# Patient Record
Sex: MALE | Race: WHITE | Employment: FULL TIME | URBAN - NONMETROPOLITAN AREA
[De-identification: names, ages, dates, MRNs, and addresses within clinical notes are randomized per-mention and may not be internally consistent; named-entity substitution may affect disease eponyms.]

---

## 2017-01-03 DIAGNOSIS — R45.4 DIFFICULTY CONTROLLING ANGER: ICD-10-CM

## 2017-01-04 RX ORDER — FLUOXETINE 10 MG/1
CAPSULE ORAL
Qty: 30 CAPSULE | Refills: 4 | Status: SHIPPED | OUTPATIENT
Start: 2017-01-04 | End: 2018-02-22 | Stop reason: SDUPTHER

## 2017-03-02 ENCOUNTER — OFFICE VISIT (OUTPATIENT)
Dept: FAMILY MEDICINE CLINIC | Age: 42
End: 2017-03-02

## 2017-03-02 VITALS
TEMPERATURE: 97.6 F | HEIGHT: 69 IN | OXYGEN SATURATION: 96 % | SYSTOLIC BLOOD PRESSURE: 116 MMHG | WEIGHT: 199 LBS | DIASTOLIC BLOOD PRESSURE: 80 MMHG | BODY MASS INDEX: 29.47 KG/M2 | HEART RATE: 108 BPM

## 2017-03-02 DIAGNOSIS — Z63.9 RELATIONSHIP PROBLEMS: ICD-10-CM

## 2017-03-02 DIAGNOSIS — T78.40XA ALLERGY, INITIAL ENCOUNTER: ICD-10-CM

## 2017-03-02 DIAGNOSIS — F41.8 SITUATIONAL ANXIETY: ICD-10-CM

## 2017-03-02 DIAGNOSIS — I10 ESSENTIAL HYPERTENSION: ICD-10-CM

## 2017-03-02 DIAGNOSIS — F41.9 ANXIETY: Primary | ICD-10-CM

## 2017-03-02 DIAGNOSIS — Z72.0 TOBACCO USE: ICD-10-CM

## 2017-03-02 PROCEDURE — 99213 OFFICE O/P EST LOW 20 MIN: CPT | Performed by: NURSE PRACTITIONER

## 2017-03-02 RX ORDER — LORATADINE 10 MG/1
10 TABLET ORAL DAILY
Qty: 30 TABLET | Refills: 11 | Status: ON HOLD | OUTPATIENT
Start: 2017-03-02 | End: 2021-06-26

## 2017-03-02 RX ORDER — PAROXETINE HYDROCHLORIDE 20 MG/1
40 TABLET, FILM COATED ORAL DAILY
Qty: 60 TABLET | Refills: 3 | Status: ON HOLD | OUTPATIENT
Start: 2017-03-02 | End: 2021-06-26

## 2017-03-02 SDOH — SOCIAL STABILITY - SOCIAL INSECURITY: PROBLEM RELATED TO PRIMARY SUPPORT GROUP, UNSPECIFIED: Z63.9

## 2017-03-02 ASSESSMENT — ENCOUNTER SYMPTOMS
BACK PAIN: 0
SHORTNESS OF BREATH: 0
SORE THROAT: 0
VOMITING: 0
NAUSEA: 0
HEARTBURN: 0
DIARRHEA: 0
COUGH: 0
CONSTIPATION: 0
ABDOMINAL PAIN: 0

## 2017-03-02 ASSESSMENT — PATIENT HEALTH QUESTIONNAIRE - PHQ9
SUM OF ALL RESPONSES TO PHQ9 QUESTIONS 1 & 2: 0
1. LITTLE INTEREST OR PLEASURE IN DOING THINGS: 0
SUM OF ALL RESPONSES TO PHQ QUESTIONS 1-9: 0
2. FEELING DOWN, DEPRESSED OR HOPELESS: 0

## 2017-10-06 ENCOUNTER — OFFICE VISIT (OUTPATIENT)
Dept: FAMILY MEDICINE CLINIC | Age: 42
End: 2017-10-06

## 2017-10-06 VITALS
OXYGEN SATURATION: 96 % | BODY MASS INDEX: 30.86 KG/M2 | WEIGHT: 203.6 LBS | HEIGHT: 68 IN | HEART RATE: 98 BPM | SYSTOLIC BLOOD PRESSURE: 118 MMHG | DIASTOLIC BLOOD PRESSURE: 78 MMHG

## 2017-10-06 DIAGNOSIS — G89.29 CHRONIC PAIN OF LEFT KNEE: Primary | ICD-10-CM

## 2017-10-06 DIAGNOSIS — Z13.220 SCREENING FOR CHOLESTEROL LEVEL: ICD-10-CM

## 2017-10-06 DIAGNOSIS — M25.562 CHRONIC PAIN OF LEFT KNEE: Primary | ICD-10-CM

## 2017-10-06 DIAGNOSIS — R06.83 SNORING: ICD-10-CM

## 2017-10-06 DIAGNOSIS — R53.82 CHRONIC FATIGUE: ICD-10-CM

## 2017-10-06 DIAGNOSIS — Z13.29 SCREENING FOR THYROID DISORDER: ICD-10-CM

## 2017-10-06 DIAGNOSIS — Z13.0 SCREENING FOR DEFICIENCY ANEMIA: ICD-10-CM

## 2017-10-06 DIAGNOSIS — Z13.21 ENCOUNTER FOR VITAMIN DEFICIENCY SCREENING: ICD-10-CM

## 2017-10-06 DIAGNOSIS — R06.02 SHORTNESS OF BREATH: ICD-10-CM

## 2017-10-06 DIAGNOSIS — Z11.4 ENCOUNTER FOR SCREENING FOR HUMAN IMMUNODEFICIENCY VIRUS (HIV): ICD-10-CM

## 2017-10-06 PROCEDURE — 99214 OFFICE O/P EST MOD 30 MIN: CPT | Performed by: NURSE PRACTITIONER

## 2017-10-06 RX ORDER — PREDNISONE 10 MG/1
TABLET ORAL
Qty: 30 TABLET | Refills: 0 | Status: ON HOLD | OUTPATIENT
Start: 2017-10-06 | End: 2021-06-26

## 2017-10-06 RX ORDER — DICLOFENAC SODIUM 75 MG/1
75 TABLET, DELAYED RELEASE ORAL 2 TIMES DAILY
Qty: 60 TABLET | Refills: 1 | Status: ON HOLD | OUTPATIENT
Start: 2017-10-06 | End: 2021-06-26

## 2017-10-06 ASSESSMENT — ENCOUNTER SYMPTOMS
SHORTNESS OF BREATH: 1
ALLERGIC/IMMUNOLOGIC NEGATIVE: 1
EYES NEGATIVE: 1
WHEEZING: 0
ORTHOPNEA: 0
SWOLLEN GLANDS: 0
HEMOPTYSIS: 0
ABDOMINAL PAIN: 0
BACK PAIN: 0
VOMITING: 0
SPUTUM PRODUCTION: 0
RHINORRHEA: 0
SORE THROAT: 1

## 2017-10-06 NOTE — PROGRESS NOTES
[facility] PHYSICIAN PRACTICES  [place] [address]  Dept: [phone]  Dept Fax: [phone]    Rosi Montanez is a 37 y.o. male who presents today for his medical conditions/complaints as noted below. Rosi Montanez is c/o of Knee Pain (Patient started with left knee pain about two weeks ago, states pain in consent, feels like something is pulling, no injury to knee. rates pain 8 out of 10) and Shortness of Breath (Chronic)        HPI:     Chief Complaint   Patient presents with    Knee Pain     Patient started with left knee pain about two weeks ago, states pain in consent, feels like something is pulling, no injury to knee. rates pain 8 out of 10    Shortness of Breath     Chronic       Knee Pain    Incident onset: About 3 months ago. Incident location: Do injury. There was no injury mechanism. The pain is present in the left knee, left thigh and left hip. The quality of the pain is described as aching (Pulling). The pain is at a severity of 8/10. The pain is severe. The pain has been constant since onset. Associated symptoms include an inability to bear weight and a loss of motion. Pertinent negatives include no loss of sensation, muscle weakness, numbness or tingling. Associated symptoms comments: States he feels like his muscle is pulling in his knee and sometimes pulls up toward his thigh  . He reports no foreign bodies present. The symptoms are aggravated by movement. He has tried nothing (Took his wifes muscle relaxer without relief) for the symptoms. The treatment provided no relief. Shortness of Breath   This is a chronic problem. The current episode started more than 1 year ago. The problem occurs intermittently. The problem has been unchanged. Associated symptoms include a sore throat (When he wakes up from sleeping).  Pertinent negatives include no abdominal pain, chest pain, claudication, coryza, ear pain, fever, headaches, hemoptysis, leg pain, leg reviewed. No pertinent surgical history. Family History   Problem Relation Age of Onset    Diabetes Maternal Grandmother     Heart Disease Maternal Grandmother        Social History   Substance Use Topics    Smoking status: Current Some Day Smoker     Packs/day: 1.00    Smokeless tobacco: Current User     Types: Snuff      Comment: pt is down to 2 cigarettes a day    Alcohol use 25.2 oz/week     42 Cans of beer per week      Comment: 6 beers a day       Current Outpatient Prescriptions   Medication Sig Dispense Refill    predniSONE (DELTASONE) 10 MG tablet Take 40 mg for 3 days then 30 mg for 3 days then 20 mg for 3 days then 10 mg for 3 days 30 tablet 0    diclofenac (VOLTAREN) 75 MG EC tablet Take 1 tablet by mouth 2 times daily 60 tablet 1    loratadine (CLARITIN) 10 MG tablet Take 1 tablet by mouth daily 30 tablet 11    omeprazole (PRILOSEC) 20 MG capsule Take 1 capsule by mouth Daily 30 capsule 3    PARoxetine (PAXIL) 20 MG tablet Take 2 tablets by mouth daily 20 mg qd for 2 weeks, then increase to 40 mg 60 tablet 3    FLUoxetine (PROZAC) 10 MG capsule TAKE ONE CAPSULE BY MOUTH DAILY 30 capsule 4     No current facility-administered medications for this visit. Allergies   Allergen Reactions    Penicillins Other (See Comments)     Reports mother told him he was allergic to PCN. Subjective:      Review of Systems   Constitutional: Positive for fatigue (Chronic). Negative for fever. HENT: Positive for sore throat (When he wakes up from sleeping). Negative for ear pain and rhinorrhea. Eyes: Negative. Respiratory: Positive for shortness of breath. Negative for hemoptysis, sputum production and wheezing. Snoring- suppose to be tested for sleep apnea   Cardiovascular: Negative for chest pain, palpitations, orthopnea, claudication, leg swelling, syncope and PND. Gastrointestinal: Negative for abdominal pain and vomiting. Endocrine: Negative. Genitourinary: Negative. Musculoskeletal: Positive for arthralgias (Left knee) and gait problem (Limps with left knee). Negative for back pain, joint swelling, myalgias, neck pain and neck stiffness. Skin: Negative. Negative for rash. Allergic/Immunologic: Negative. Neurological: Negative for tingling, numbness and headaches. Hematological: Negative. Psychiatric/Behavioral: Negative. Objective:     Vitals:    10/06/17 0935   BP: 118/78   Site: Left Arm   Position: Sitting   Cuff Size: Medium Adult   Pulse: 98   SpO2: 96%   Weight: 203 lb 9.6 oz (92.4 kg)   Height: 5' 8\" (1.727 m)     Physical Exam   Constitutional: He is oriented to person, place, and time. He appears well-developed and well-nourished. No distress. HENT:   Head: Normocephalic and atraumatic. Right Ear: External ear normal.   Left Ear: External ear normal.   Nose: Nose normal.   Mouth/Throat: Oropharynx is clear and moist.   Eyes: Conjunctivae and EOM are normal. Pupils are equal, round, and reactive to light. Right eye exhibits no discharge. Left eye exhibits no discharge. Neck: Normal range of motion. Neck supple. No tracheal deviation present. Cardiovascular: Normal rate, regular rhythm, normal heart sounds and intact distal pulses. Exam reveals no gallop and no friction rub. No murmur heard. Pulmonary/Chest: Effort normal and breath sounds normal. No respiratory distress. He has no wheezes. He has no rales. He exhibits no tenderness. Abdominal: Soft. Bowel sounds are normal. He exhibits no distension and no mass. There is no tenderness. There is no rebound and no guarding. Musculoskeletal: He exhibits no edema, tenderness or deformity. Left knee: He exhibits decreased range of motion. Lymphadenopathy:     He has no cervical adenopathy. Neurological: He is alert and oriented to person, place, and time. Skin: Skin is warm and dry. No rash noted. He is not diaphoretic. Psychiatric: He has a normal mood and affect.  His use the same precautions as listed above. Call if pattern of symptoms change or persists for an extended time. It is very important that he quit smoking. There are various alternatives available to help with this difficult task, but first and foremost, he must make a firm commitment and decision to quit. The nature of nicotine addiction is discussed. The usefulness of behavioral therapy is discussed and suggested. The correct use, cost and side effects of nicotine replacement therapy such as gum or patches is discussed. Bupropion and its cost (sometimes not covered fully by insurance) and side effects are reviewed. The quit rates are discussed. I recommend he not allow potential costs of treatment to deter him from using nicotine replacement therapy or bupropion, as the long term economic and health benefits are obvious. Tobacco abuse: Patient was counseled about stopping tobacco use. Discussed harmful effects of continued tobacco use including COPD, cardiovascular disease, and/or death. Discussed the increased risk of pneumonia as well as the potential for substantial decline in lung function. The following recommendations were given to improve chances of quittin)                   Chances of stopping improve with each attempt     2)                   Encourage all other occupants in the house to quit     3)                   Remove all tobacco products from home, work, and vehicles     4)                   Tobacco cessation aids such as nicotine replacement therapy options    I have recommended that this patient have a flu shot and immunization for pneumonia but he declines at this time. I have discussed the risks and benefits of this procedure with him. The patient verbalizes understanding.

## 2017-10-06 NOTE — MR AVS SNAPSHOT
After Visit Summary             Keri Strickland   10/6/2017 9:30 AM   Office Visit    Description:  Male : 1975   Provider:  Kennedy Macdonald CNP   Department:  [facility]              Your Follow-Up and Future Appointments         Below is a list of your follow-up and future appointments. This may not be a complete list as you may have made appointments directly with providers that we are not aware of or your providers may have made some for you. Please call your providers to confirm appointments. It is important to keep your appointments. Please bring your current insurance card, photo ID, co-pay, and all medication bottles to your appointment. If self-pay, payment is expected at the time of service.         Your To-Do List     Future Orders Complete By Expires    XR Knee Left 3VW [87571 Custom]  10/6/2017 10/6/2018    CBC Auto Differential [EGJ4625 Custom]  10/19/2017 10/6/2018    Lipid Panel [[id number] Custom]  10/26/2017 10/6/2018    TSH without Reflex [GTT108 Custom]  10/27/2017 10/6/2018    Vitamin B12 & Folate [YRN6440 Custom]  10/27/2017 10/6/2018    HIV Screen [UMV373 Custom]  2017 10/6/2018    T4, Free [LME395 Custom]  2017 10/6/2018    Comprehensive Metabolic Panel [RMN94 Custom]  2017 10/6/2018    Vitamin D 25 Hydroxy [QDH789 Custom]  11/10/2017 10/6/2018    FULL PFT STUDY WITH PRE AND POST [PFT30 Custom]  2017 10/6/2018    Follow-Up    Return in about 6 months (around 2018) for Annual physical.         Information from Your Visit        Department     Name Address Phone Fax    [address] [phone]      You Were Seen for:         Comments    Snoring   [[id number]]         Vital Signs     Blood Pressure Pulse Height Weight Oxygen Saturation Body Mass Index    118/78 (Site: Left Arm, Position: Sitting, Cuff Size: Medium Adult) 98 5' 8\" (1.727 m) 203 lb 9.6 oz (92.4 kg) 96% 30.96 kg/m2 Smoking Status                   Current Some Day Smoker           Additional Information about your Body Mass Index (BMI)           Your BMI as listed above is considered obese (30 or more). BMI is an estimate of body fat, calculated from your height and weight. The higher your BMI, the greater your risk of heart disease, high blood pressure, type 2 diabetes, stroke, gallstones, arthritis, sleep apnea, and certain cancers. BMI is not perfect. It may overestimate body fat in athletes and people who are more muscular. Even a small weight loss (between 5 and 10 percent of your current weight) by decreasing your calorie intake and becoming more physically active will help lower your risk of developing or worsening diseases associated with obesity. Learn more at: [employer]          Instructions    glucosomine OTC     Knee Pain or Injury: Care Instructions  Your Care Instructions    Injuries are a common cause of knee problems. Sudden (acute) injuries may be caused by a direct blow to the knee. They can also be caused by abnormal twisting, bending, or falling on the knee. Pain, bruising, or swelling may be severe, and may start within minutes of the injury. Overuse is another cause of knee pain. Other causes are climbing stairs, kneeling, and other activities that use the knee. Everyday wear and tear, especially as you get older, also can cause knee pain. Rest, along with home treatment, often relieves pain and allows your knee to heal. If you have a serious knee injury, you may need tests and treatment. Follow-up care is a key part of your treatment and safety. Be sure to make and go to all appointments, and call your doctor if you are having problems. It's also a good idea to know your test results and keep a list of the medicines you take. How can you care for yourself at home? · Be safe with medicines. Read and follow all instructions on the label. ¨ If the doctor gave you a prescription medicine for pain, take it as prescribed. ¨ If you are not taking a prescription pain medicine, ask your doctor if you can take an over-the-counter medicine. · Rest and protect your knee. Take a break from any activity that may cause pain. · Put ice or a cold pack on your knee for 10 to 20 minutes at a time. Put a thin cloth between the ice and your skin. · Prop up a sore knee on a pillow when you ice it or anytime you sit or lie down for the next 3 days. Try to keep it above the level of your heart. This will help reduce swelling. · If your knee is not swollen, you can put moist heat, a heating pad, or a warm cloth on your knee. · If your doctor recommends an elastic bandage, sleeve, or other type of support for your knee, wear it as directed. · Follow your doctor's instructions about how much weight you can put on your leg. Use a cane, crutches, or a walker as instructed. · Follow your doctor's instructions about activity during your healing process. If you can do mild exercise, slowly increase your activity. · Reach and stay at a healthy weight. Extra weight can strain the joints, especially the knees and hips, and make the pain worse. Losing even a few pounds may help. When should you call for help? Call 911 anytime you think you may need emergency care. For example, call if:  · You have symptoms of a blood clot in your lung (called a pulmonary embolism). These may include:  ¨ Sudden chest pain. ¨ Trouble breathing. ¨ Coughing up blood. Call your doctor now or seek immediate medical care if:  · You have severe or increasing pain. · Your leg or foot turns cold or changes color. · You cannot stand or put weight on your knee. · Your knee looks twisted or bent out of shape. · You cannot move your knee. · You have signs of infection, such as:  ¨ Increased pain, swelling, warmth, or redness. ¨ Red streaks leading from the knee. These medications were sent to [facility] [place], [place] - [address] [facility] - P [phone] Adam Duncan [phone]  [address] [facility] [address]     Phone:  [phone]     diclofenac 75 MG EC tablet    predniSONE 10 MG tablet               Your Current Medications Are              predniSONE (DELTASONE) 10 MG tablet Take 40 mg for 3 days then 30 mg for 3 days then 20 mg for 3 days then 10 mg for 3 days    diclofenac (VOLTAREN) 75 MG EC tablet Take 1 tablet by mouth 2 times daily    loratadine (CLARITIN) 10 MG tablet Take 1 tablet by mouth daily    omeprazole (PRILOSEC) 20 MG capsule Take 1 capsule by mouth Daily    PARoxetine (PAXIL) 20 MG tablet Take 2 tablets by mouth daily 20 mg qd for 2 weeks, then increase to 40 mg    FLUoxetine (PROZAC) 10 MG capsule TAKE ONE CAPSULE BY MOUTH DAILY      Allergies              Penicillins Other (See Comments)    Reports mother told him he was allergic to PCN. We Ordered/Performed the following           [address], [address] Instructions:    [facility]  [address], [address], [facility]  Phone: [phone]    One of the many advantages of the [address] is that we all work together closely to make healthcare easy for you. We have contacted the physician practice to inform them we have referred you. For your convenience, their office should call you within a few days to schedule   an appointment, but if you would like to call them sooner their information is above    Comments: The patient can be scheduled with any member of the group, including the provider with the first available appointments.      Rui Guerra MD (General)     Scheduling Instructions:    [facility], [address] [place] [address], [address] [place] [address] [address]  Ph: [phone]

## 2017-10-06 NOTE — PATIENT INSTRUCTIONS
glucosomine OTC     Knee Pain or Injury: Care Instructions  Your Care Instructions    Injuries are a common cause of knee problems. Sudden (acute) injuries may be caused by a direct blow to the knee. They can also be caused by abnormal twisting, bending, or falling on the knee. Pain, bruising, or swelling may be severe, and may start within minutes of the injury. Overuse is another cause of knee pain. Other causes are climbing stairs, kneeling, and other activities that use the knee. Everyday wear and tear, especially as you get older, also can cause knee pain. Rest, along with home treatment, often relieves pain and allows your knee to heal. If you have a serious knee injury, you may need tests and treatment. Follow-up care is a key part of your treatment and safety. Be sure to make and go to all appointments, and call your doctor if you are having problems. It's also a good idea to know your test results and keep a list of the medicines you take. How can you care for yourself at home? · Be safe with medicines. Read and follow all instructions on the label. ¨ If the doctor gave you a prescription medicine for pain, take it as prescribed. ¨ If you are not taking a prescription pain medicine, ask your doctor if you can take an over-the-counter medicine. · Rest and protect your knee. Take a break from any activity that may cause pain. · Put ice or a cold pack on your knee for 10 to 20 minutes at a time. Put a thin cloth between the ice and your skin. · Prop up a sore knee on a pillow when you ice it or anytime you sit or lie down for the next 3 days. Try to keep it above the level of your heart. This will help reduce swelling. · If your knee is not swollen, you can put moist heat, a heating pad, or a warm cloth on your knee. · If your doctor recommends an elastic bandage, sleeve, or other type of support for your knee, wear it as directed.   · Follow your doctor's instructions about how much weight you can 9053-2557 [employer]. Care instructions adapted under license by [facility] ([facility]). If you have questions about a medical condition or this instruction, karin     Sleep Apnea: Care Instructions  Your Care Instructions  Sleep apnea means that you frequently stop breathing for 10 seconds or longer during sleep. It can be mild to severe, based on the number of times an hour that you stop breathing or have slowed breathing. Blocked or narrowed airways in your nose, mouth, or throat can cause sleep apnea. Your airway can become blocked when your throat muscles and tongue relax during sleep. You can treat sleep apnea at home by making lifestyle changes. You also can use a CPAP breathing machine that keeps tissues in the throat from blocking your airway. Or your doctor may suggest that you use a breathing device while you sleep. It helps keep your airway open. This could be a device that you put in your mouth. Other examples include strips or disks that you use on your nose. In some cases, surgery may be needed to remove enlarged tissues in the throat. Follow-up care is a key part of your treatment and safety. Be sure to make and go to all appointments, and call your doctor if you are having problems. It's also a good idea to know your test results and keep a list of the medicines you take. How can you care for yourself at home? · Lose weight, if needed. It may reduce the number of times you stop breathing or have slowed breathing. · Sleep on your side. It may stop mild apnea. If you tend to roll onto your back, sew a pocket in the back of your pajama top. Put a tennis ball into the pocket, and stitch the pocket shut. This will help keep you from sleeping on your back. · Avoid alcohol and medicines such as sleeping pills and sedatives before bed. · Do not smoke. Smoking can make sleep apnea worse. If you need help quitting, talk to your doctor about stop-smoking programs and medicines.  These can increase your chances of quitting for good. · Prop up the head of your bed 4 to 6 inches by putting bricks under the legs of the bed. · Treat breathing problems, such as a stuffy nose, caused by a cold or allergies. · Try a continuous positive airway pressure (CPAP) breathing machine if your doctor recommends it. The machine keeps your airway open when you sleep. · If CPAP does not work for you, ask your doctor if you can try other breathing machines. A bilevel positive airway pressure machine uses one type of air pressure for breathing in and another type for breathing out. Another device raises or lowers air pressure as needed while you breathe. · Talk to your doctor if:  ¨ Your nose feels dry or bleeds when you use one of these machines. You may need to increase moisture in the air. A humidifier may help. ¨ Your nose is runny or stuffy from using a breathing machine. Decongestants or a corticosteroid nasal spray may help. ¨ You are sleepy during the day and it gets in the way of the normal things you do. Do not drive when you are drowsy. When should you call for help? Watch closely for changes in your health, and be sure to contact your doctor if:  · You still have sleep apnea even though you have made lifestyle changes. · You are thinking of trying a device such as CPAP. · You are having problems using a CPAP or similar machine. Where can you learn more? Go to https://Whistlestoppetrivago.[employer] org and sign in to your [employer] account. Enter R423 in the [facility] box to learn more about \"Sleep Apnea: Care Instructions. \"     If you do not have an account, please click on the \"Sign Up Now\" link. Current as of: March 25, 2017  Content Version: 11.3  © 8288-2012 [employer]. Care instructions adapted under license by [facility] ([facility]).  If you have questions about a medical condition or this instruction, always ask your healthcare professional. [address] any warranty or liability for your use of this information. ys ask your healthcare professional. [place] [address] any warranty or liability for your use of this information. Sleep Apnea: Care Instructions  Your Care Instructions  Sleep apnea means that you frequently stop breathing for 10 seconds or longer during sleep. It can be mild to severe, based on the number of times an hour that you stop breathing or have slowed breathing. Blocked or narrowed airways in your nose, mouth, or throat can cause sleep apnea. Your airway can become blocked when your throat muscles and tongue relax during sleep. You can treat sleep apnea at home by making lifestyle changes. You also can use a CPAP breathing machine that keeps tissues in the throat from blocking your airway. Or your doctor may suggest that you use a breathing device while you sleep. It helps keep your airway open. This could be a device that you put in your mouth. Other examples include strips or disks that you use on your nose. In some cases, surgery may be needed to remove enlarged tissues in the throat. Follow-up care is a key part of your treatment and safety. Be sure to make and go to all appointments, and call your doctor if you are having problems. It's also a good idea to know your test results and keep a list of the medicines you take. How can you care for yourself at home? · Lose weight, if needed. It may reduce the number of times you stop breathing or have slowed breathing. · Sleep on your side. It may stop mild apnea. If you tend to roll onto your back, sew a pocket in the back of your pajama top. Put a tennis ball into the pocket, and stitch the pocket shut. This will help keep you from sleeping on your back. · Avoid alcohol and medicines such as sleeping pills and sedatives before bed. · Do not smoke. Smoking can make sleep apnea worse.  If you need help quitting, talk to your doctor about stop-smoking programs and Incorporated disclaims any warranty or liability for your use of this information.

## 2017-10-07 PROBLEM — R06.02 SHORTNESS OF BREATH: Status: ACTIVE | Noted: 2017-10-07

## 2017-10-10 ENCOUNTER — TELEPHONE (OUTPATIENT)
Dept: FAMILY MEDICINE CLINIC | Age: 42
End: 2017-10-10

## 2017-10-10 DIAGNOSIS — G89.29 CHRONIC KNEE PAIN, UNSPECIFIED LATERALITY: Primary | ICD-10-CM

## 2017-10-10 DIAGNOSIS — M25.569 CHRONIC KNEE PAIN, UNSPECIFIED LATERALITY: Primary | ICD-10-CM

## 2017-10-10 NOTE — TELEPHONE ENCOUNTER
Pt called asking if something stronger than the Prednisone and Voltaren could be called in for his knee pain. Pt said that while he is at work, walking, climbing steps and climbing in and out of equipment it is causing him more pain. Pt said that the prednisone and Voltaren seems to help when he is home not using the knee at much but at work it is causing him more pain. Pt uses [employer] and can be reached back at [phone].

## 2017-10-10 NOTE — TELEPHONE ENCOUNTER
[phone] I spoke to the pt at this time and he states the appointment with ortho is November 2nd. Pt states he does go for the x ray on Friday. I did make the pt aware he can take the tylenol and also can be referred to pain management and he requested to go to dr Brenda Arredondo.

## 2017-10-17 ENCOUNTER — TELEPHONE (OUTPATIENT)
Dept: PULMONOLOGY | Age: 42
End: 2017-10-17

## 2017-10-17 NOTE — TELEPHONE ENCOUNTER
Patient did not show for new patient snoring referred by Kaia Ansari  with CNP [facility] on 10/17/17      Patient was also no show on: 3/16/16

## 2017-10-23 ENCOUNTER — TELEPHONE (OUTPATIENT)
Dept: PULMONOLOGY | Age: 42
End: 2017-10-23

## 2017-10-31 ENCOUNTER — HOSPITAL ENCOUNTER (OUTPATIENT)
Dept: OTHER | Age: 42
Discharge: HOME | End: 2017-10-31
Attending: NURSE PRACTITIONER | Admitting: NURSE PRACTITIONER

## 2017-10-31 DIAGNOSIS — M25.562 CHRONIC PAIN OF LEFT KNEE: ICD-10-CM

## 2017-10-31 DIAGNOSIS — G89.29 CHRONIC PAIN OF LEFT KNEE: ICD-10-CM

## 2017-11-02 ENCOUNTER — OFFICE VISIT (OUTPATIENT)
Dept: ORTHOPEDIC SURGERY | Age: 42
End: 2017-11-02

## 2017-11-02 VITALS — BODY MASS INDEX: 30.87 KG/M2 | HEIGHT: 68 IN | WEIGHT: 203.71 LBS

## 2017-11-02 DIAGNOSIS — M25.562 LEFT KNEE PAIN, UNSPECIFIED CHRONICITY: ICD-10-CM

## 2017-11-02 DIAGNOSIS — M22.42 CHONDROMALACIA PATELLAE OF LEFT KNEE: Primary | ICD-10-CM

## 2017-11-02 PROCEDURE — 99243 OFF/OP CNSLTJ NEW/EST LOW 30: CPT | Performed by: ORTHOPAEDIC SURGERY

## 2017-11-02 NOTE — PATIENT INSTRUCTIONS
Patient Education        Knee: Exercises  Your Care Instructions  Here are some examples of exercises for your knee. Start each exercise slowly. Ease off the exercise if you start to have pain. Your doctor or physical therapist will tell you when you can start these exercises and which ones will work best for you. How to do the exercises  Quad sets    1. Sit with your leg straight and supported on the floor or a firm bed. (If you feel discomfort in the front or back of your knee, place a small towel roll under your knee.)  2. Tighten the muscles on top of your thigh by pressing the back of your knee flat down to the floor. (If you feel discomfort under your kneecap, place a small towel roll under your knee.)  3. Hold for about 6 seconds, then rest for up to 10 seconds. 4. Do 8 to 12 repetitions several times a day. Straight-leg raises to the front    1. Lie on your back with your good knee bent so that your foot rests flat on the floor. Your injured leg should be straight. Make sure that your low back has a normal curve. You should be able to slip your flat hand in between the floor and the small of your back, with your palm touching the floor and your back touching the back of your hand. 2. Tighten the thigh muscles in the injured leg by pressing the back of your knee flat down to the floor. Hold your knee straight. 3. Keeping the thigh muscles tight, lift your injured leg up so that your heel is about 12 inches off the floor. Hold for about 6 seconds and then lower slowly. 4. Do 8 to 12 repetitions, 3 times a day. Straight-leg raises to the outside    1. Lie on your side, with your injured leg on top. 2. Tighten the front thigh muscles of your injured leg to keep your knee straight. 3. Keep your hip and your leg straight in line with the rest of your body, and keep your knee pointing forward. Do not drop your hip back.   4. Lift your injured leg straight up toward the ceiling, about 12 inches off the your ankle (not more than 5 pounds). With weight, you do not have to lift your leg more than 12 inches to get a hamstring workout. Shallow standing knee bends    Note: Do this exercise only if you have very little pain; if you have no clicking, locking, or giving way if you have an injured knee; and if it does not hurt while you are doing 8 to 12 repetitions. 1. Stand with your hands lightly resting on a counter or chair in front of you. Put your feet shoulder-width apart. 2. Slowly bend your knees so that you squat down like you are going to sit in a chair. Make sure your knees do not go in front of your toes. 3. Lower yourself about 6 inches. Your heels should remain on the floor at all times. 4. Rise slowly to a standing position. Heel raises    1. Stand with your feet a few inches apart, with your hands lightly resting on a counter or chair in front of you. 2. Slowly raise your heels off the floor while keeping your knees straight. 3. Hold for about 6 seconds, then slowly lower your heels to the floor. 4. Do 8 to 12 repetitions several times during the day. Follow-up care is a key part of your treatment and safety. Be sure to make and go to all appointments, and call your doctor if you are having problems. It's also a good idea to know your test results and keep a list of the medicines you take. Where can you learn more? Go to [email or web address]. org and sign in to your [employer] account. Enter L012 in the [facility] box to learn more about \"Knee: Exercises. \"     If you do not have an account, please click on the \"Sign Up Now\" link. Current as of: March 21, 2017  Content Version: 11.3  © 8915-8944 [employer]. Care instructions adapted under license by [facility] [employer] [facility] ([facility]).  If you have questions about a medical condition or this instruction, always ask your healthcare professional. [place] [address] any warranty or liability for your use of this

## 2017-11-13 ENCOUNTER — TELEPHONE (OUTPATIENT)
Dept: PULMONOLOGY | Age: 42
End: 2017-11-13

## 2017-11-13 NOTE — TELEPHONE ENCOUNTER
Noted. Patient did not keep appointment as was recommended. It is okay to schedule a follow up visit for this patient if he/she calls requesting such appointment. Please let referring provider know patient was a no show for new sleep appt on 3 separate occassions.

## 2017-11-13 NOTE — TELEPHONE ENCOUNTER
Patient did not show for NPT-Snoring  with Rypos Runner on 11/13/17    Patient was also no show on: 3/16/16, 10/17/17. Patient called in stating he had to leave work due to being ill. Patient cancelled his appointment today. Patient was informed this would be his 3rd no show and the NP has the decision to dismiss patient. Please advise.

## 2017-12-21 ENCOUNTER — HOSPITAL ENCOUNTER (OUTPATIENT)
Dept: PULMONOLOGY | Age: 42
Discharge: HOME | End: 2017-12-21
Attending: NURSE PRACTITIONER | Admitting: NURSE PRACTITIONER

## 2017-12-21 VITALS — OXYGEN SATURATION: 96 %

## 2017-12-21 DIAGNOSIS — R06.02 SHORTNESS OF BREATH: ICD-10-CM

## 2017-12-21 RX ORDER — ALBUTEROL SULFATE 2.5 MG/3ML
2.5 SOLUTION RESPIRATORY (INHALATION) ONCE
Status: COMPLETED | OUTPATIENT
Start: 2017-12-21 | End: 2017-12-21

## 2017-12-21 RX ADMIN — ALBUTEROL SULFATE 2.5 MG: 2.5 SOLUTION RESPIRATORY (INHALATION) at 09:09

## 2017-12-22 NOTE — PROCEDURES
[address]                  [address] [place] [address]                                PULMONARY FUNCTION    PATIENT NAME: Oleg Farris                       :        1975  MED REC NO:   [id number]                          ROOM:  ACCOUNT NO:   [de-identified]                          ADMIT DATE: 2017  PROVIDER:     Rishi Zaragoza MD    DATE OF PROCEDURE:  2017    INDICATION:  Dyspnea. 1.  Spirometry revealed no evidence of obstructive defect. FEV1 is 3.13 L,  which is 77% of predicted. No significant response to bronchodilators. FEV1/FVC ratio of 81%. 2.  Lung volume revealed normal total lung capacity of 6.03 L, which is 85%  of predicted. 3.  Diffusion capacity is normal at 27.68, which is 80% of predicted. 4.  Flow volume loops appeared to be normal.    CONCLUSION:  1. No evidence of obstructive defect or restrictive defect. 2.  Normal diffusion capacity. 3.  No significant bronchodilator response. 4.  Overall unremarkable PFTs do not explain the patient's dyspnea.         Carolyn Franks MD    D: 2017 12:34:20       T: 2017 0:03:44     [email or web address]  Job#: [id number]     Doc#: [id number]    CC:

## 2018-02-22 DIAGNOSIS — R45.4 DIFFICULTY CONTROLLING ANGER: ICD-10-CM

## 2018-02-22 RX ORDER — FLUOXETINE HYDROCHLORIDE 20 MG/1
20 CAPSULE ORAL DAILY
Qty: 30 CAPSULE | Refills: 11 | Status: ON HOLD | OUTPATIENT
Start: 2018-02-22 | End: 2021-06-26

## 2020-04-08 ENCOUNTER — NURSE TRIAGE (OUTPATIENT)
Dept: OTHER | Facility: CLINIC | Age: 45
End: 2020-04-08

## 2020-04-08 NOTE — TELEPHONE ENCOUNTER
Reason for Disposition   [1] Caller concerned that exposure to COVID-19 occurred BUT [2] does not meet COVID-19 EXPOSURE criteria from [facility]    Answer Assessment - Initial Assessment Questions  1. CLOSE CONTACT: \"Who is the person with the confirmed or suspected COVID-19 infection that you were exposed to? \"      [employer]op[employer] shop that the patient frequented  2. PLACE of CONTACT: \"Where were you when you were exposed to COVID-19? 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 TYPE of CONTACT: \"How much contact was there? \" (e.g., sitting next to, live in same house, work in same office, same building)      Standing next to him  4. DURATION of CONTACT: \"How long were you in contact with the COVID-19 patient? \" (e.g., a few seconds, passed by person, a few minutes, live with the patient)      Spent 15 to 20 minutes  5. DATE of CONTACT: \"When did you have contact with a COVID-19 patient? \" (e.g., how many days ago)      2 days jono  6. TRAVEL: \"Have you traveled out of the country recently? \" If so, \"When and where? \"      * Also ask about out-of-state travel, since the [facility] has identified some high risk cities for community spread in the [address] [place] [address]. * Note: Travel becomes less relevant if there is widespread community transmission where the patient lives. no  7. COMMUNITY SPREAD: \"Are there lots of cases of COVID-19 (community spread) where you live? \" (See public health department website, if unsure)    * MAJOR community spread: high number of cases; numbers of cases are increasing; many people hospitalized. * MINOR community spread: low number of cases; not increasing; few or no people hospitalized      major  8. SYMPTOMS: \"Do you have any symptoms? \" (e.g., fever, cough, breathing difficulty)     cough  9. PREGNANCY OR POSTPARTUM: \"Is there any chance you are pregnant? \" \"When was your last menstrual period? \" \"Did you deliver in the last 2 weeks? \"      na  10.  HIGH RISK:

## 2020-04-09 ENCOUNTER — TELEPHONE (OUTPATIENT)
Dept: FAMILY MEDICINE CLINIC | Age: 45
End: 2020-04-09

## 2020-04-09 ENCOUNTER — E-VISIT (OUTPATIENT)
Dept: FAMILY MEDICINE CLINIC | Age: 45
End: 2020-04-09

## 2020-04-09 PROCEDURE — 99421 OL DIG E/M SVC 5-10 MIN: CPT | Performed by: NURSE PRACTITIONER

## 2020-04-09 ASSESSMENT — LIFESTYLE VARIABLES
SMOKING_STATUS: YES
SMOKING_YEARS: 10

## 2020-04-09 NOTE — PROGRESS NOTES
\"high-touch\" surfaces everyday  High touch surfaces include counters, tabletops, doorknobs, bathroom fixtures, toilets, phones, keyboards, tablets, and bedside tables. Also, clean any surfaces that may have blood, stool, or body fluids on them. Use a household cleaning spray or wipe, according to the label instructions. Labels contain instructions for safe and effective use of the cleaning product including precautions you should take when applying the product, such as wearing gloves and making sure you have good ventilation during use of the product. Monitor your symptoms     Seek prompt medical attention if your illness is worsening (e.g., difficulty breathing). Before seeking care, call your healthcare provider and tell them that you have, or are being evaluated for, COVID-19. Put on a facemask before you enter the facility. These steps will help the healthcare provider's office to keep other people in the office or waiting room from getting infected or exposed. Ask your healthcare provider to call the local or state health department. Persons who are placed under active monitoring or facilitated self-monitoring should follow instructions provided by their local health department or occupational health professionals, as appropriate. When working with your local health department check their available hours. If you have a medical emergency and need to call 911, notify the dispatch personnel that you have, or are being evaluated for COVID-19. If possible, put on a facemask before emergency medical services arrive. A total of 8 minutes was spent on this virtual visit encounter.

## 2021-06-26 ENCOUNTER — HOSPITAL ENCOUNTER (EMERGENCY)
Age: 46
Discharge: ANOTHER HOSPITAL | End: 2021-06-26
Attending: EMERGENCY MEDICINE

## 2021-06-26 ENCOUNTER — HOSPITAL ENCOUNTER (INPATIENT)
Age: 46
LOS: 6 days | Discharge: HOME HEALTH | DRG: 232 | End: 2021-07-02
Attending: INTERNAL MEDICINE | Admitting: THORACIC SURGERY (CARDIOTHORACIC VASCULAR SURGERY)

## 2021-06-26 DIAGNOSIS — F17.200 SMOKER: ICD-10-CM

## 2021-06-26 DIAGNOSIS — G89.18 ACUTE POST-OPERATIVE PAIN: Primary | ICD-10-CM

## 2021-06-26 DIAGNOSIS — I10 UNCONTROLLED HYPERTENSION: ICD-10-CM

## 2021-06-26 DIAGNOSIS — R07.9 CHEST PAIN, UNSPECIFIED TYPE: ICD-10-CM

## 2021-06-26 DIAGNOSIS — I21.3 ST ELEVATION MYOCARDIAL INFARCTION (STEMI), UNSPECIFIED ARTERY (HCC): Primary | ICD-10-CM

## 2021-06-26 PROBLEM — E66.9 CLASS 1 OBESITY IN ADULT: Status: ACTIVE | Noted: 2021-06-26

## 2021-06-26 PROBLEM — F10.10 ALCOHOL ABUSE: Status: ACTIVE | Noted: 2021-06-26

## 2021-06-26 PROBLEM — G47.30 OBSERVED SLEEP APNEA: Status: ACTIVE | Noted: 2021-06-26

## 2021-06-26 PROBLEM — I25.5 ISCHEMIC CARDIOMYOPATHY: Status: ACTIVE | Noted: 2021-06-26

## 2021-06-26 PROBLEM — R79.89 ELEVATED LFTS: Status: ACTIVE | Noted: 2021-06-26

## 2021-06-26 LAB
A/G RATIO: 1.5 (ref 1.1–2.2)
A/G RATIO: 1.8 (ref 1.1–2.2)
ALBUMIN SERPL-MCNC: 4.1 G/DL (ref 3.4–5)
ALBUMIN SERPL-MCNC: 4.8 G/DL (ref 3.4–5)
ALP BLD-CCNC: 80 U/L (ref 40–129)
ALP BLD-CCNC: 89 U/L (ref 40–129)
ALT SERPL-CCNC: 57 U/L (ref 10–40)
ALT SERPL-CCNC: 65 U/L (ref 10–40)
ANION GAP SERPL CALCULATED.3IONS-SCNC: 13 MMOL/L (ref 3–16)
ANION GAP SERPL CALCULATED.3IONS-SCNC: 8 MMOL/L (ref 3–16)
AST SERPL-CCNC: 129 U/L (ref 15–37)
AST SERPL-CCNC: 32 U/L (ref 15–37)
BASOPHILS ABSOLUTE: 0.1 K/UL (ref 0–0.2)
BASOPHILS ABSOLUTE: 0.1 K/UL (ref 0–0.2)
BASOPHILS RELATIVE PERCENT: 0.8 %
BASOPHILS RELATIVE PERCENT: 1.1 %
BILIRUB SERPL-MCNC: 0.4 MG/DL (ref 0–1)
BILIRUB SERPL-MCNC: 0.7 MG/DL (ref 0–1)
BUN BLDV-MCNC: 12 MG/DL (ref 7–20)
BUN BLDV-MCNC: 13 MG/DL (ref 7–20)
CALCIUM SERPL-MCNC: 9.1 MG/DL (ref 8.3–10.6)
CALCIUM SERPL-MCNC: 9.4 MG/DL (ref 8.3–10.6)
CHLORIDE BLD-SCNC: 103 MMOL/L (ref 99–110)
CHLORIDE BLD-SCNC: 103 MMOL/L (ref 99–110)
CO2: 22 MMOL/L (ref 21–32)
CO2: 22 MMOL/L (ref 21–32)
CREAT SERPL-MCNC: 1 MG/DL (ref 0.9–1.3)
CREAT SERPL-MCNC: 1.1 MG/DL (ref 0.9–1.3)
EKG ATRIAL RATE: 85 BPM
EKG ATRIAL RATE: 88 BPM
EKG DIAGNOSIS: NORMAL
EKG DIAGNOSIS: NORMAL
EKG P AXIS: 44 DEGREES
EKG P AXIS: 44 DEGREES
EKG P-R INTERVAL: 128 MS
EKG P-R INTERVAL: 136 MS
EKG Q-T INTERVAL: 346 MS
EKG Q-T INTERVAL: 368 MS
EKG QRS DURATION: 86 MS
EKG QRS DURATION: 90 MS
EKG QTC CALCULATION (BAZETT): 411 MS
EKG QTC CALCULATION (BAZETT): 445 MS
EKG R AXIS: 45 DEGREES
EKG R AXIS: 50 DEGREES
EKG T AXIS: 25 DEGREES
EKG T AXIS: 37 DEGREES
EKG VENTRICULAR RATE: 85 BPM
EKG VENTRICULAR RATE: 88 BPM
EOSINOPHILS ABSOLUTE: 0.1 K/UL (ref 0–0.6)
EOSINOPHILS ABSOLUTE: 0.2 K/UL (ref 0–0.6)
EOSINOPHILS RELATIVE PERCENT: 0.9 %
EOSINOPHILS RELATIVE PERCENT: 1.4 %
GFR AFRICAN AMERICAN: >60
GFR AFRICAN AMERICAN: >60
GFR NON-AFRICAN AMERICAN: >60
GFR NON-AFRICAN AMERICAN: >60
GLOBULIN: 2.7 G/DL
GLOBULIN: 2.8 G/DL
GLUCOSE BLD-MCNC: 129 MG/DL (ref 70–99)
GLUCOSE BLD-MCNC: 130 MG/DL (ref 70–99)
HCT VFR BLD CALC: 40.4 % (ref 40.5–52.5)
HCT VFR BLD CALC: 46.2 % (ref 40.5–52.5)
HEMOGLOBIN: 14.6 G/DL (ref 13.5–17.5)
HEMOGLOBIN: 15.6 G/DL (ref 13.5–17.5)
INR BLD: 1.03 (ref 0.86–1.14)
LV EF: 48 %
LV EF: 60 %
LVEF MODALITY: NORMAL
LVEF MODALITY: NORMAL
LYMPHOCYTES ABSOLUTE: 1.2 K/UL (ref 1–5.1)
LYMPHOCYTES ABSOLUTE: 1.9 K/UL (ref 1–5.1)
LYMPHOCYTES RELATIVE PERCENT: 10.5 %
LYMPHOCYTES RELATIVE PERCENT: 14.3 %
MAGNESIUM: 2.1 MG/DL (ref 1.8–2.4)
MAGNESIUM: 2.2 MG/DL (ref 1.8–2.4)
MCH RBC QN AUTO: 32.3 PG (ref 26–34)
MCH RBC QN AUTO: 33 PG (ref 26–34)
MCHC RBC AUTO-ENTMCNC: 33.9 G/DL (ref 31–36)
MCHC RBC AUTO-ENTMCNC: 36 G/DL (ref 31–36)
MCV RBC AUTO: 91.5 FL (ref 80–100)
MCV RBC AUTO: 95.3 FL (ref 80–100)
MONOCYTES ABSOLUTE: 0.5 K/UL (ref 0–1.3)
MONOCYTES ABSOLUTE: 1 K/UL (ref 0–1.3)
MONOCYTES RELATIVE PERCENT: 4.8 %
MONOCYTES RELATIVE PERCENT: 7.7 %
NEUTROPHILS ABSOLUTE: 9.3 K/UL (ref 1.7–7.7)
NEUTROPHILS ABSOLUTE: 9.8 K/UL (ref 1.7–7.7)
NEUTROPHILS RELATIVE PERCENT: 75.5 %
NEUTROPHILS RELATIVE PERCENT: 83 %
PDW BLD-RTO: 12.9 % (ref 12.4–15.4)
PDW BLD-RTO: 13 % (ref 12.4–15.4)
PHOSPHORUS: 3 MG/DL (ref 2.5–4.9)
PLATELET # BLD: 177 K/UL (ref 135–450)
PLATELET # BLD: 192 K/UL (ref 135–450)
PMV BLD AUTO: 8.3 FL (ref 5–10.5)
PMV BLD AUTO: 9.4 FL (ref 5–10.5)
POTASSIUM SERPL-SCNC: 3.7 MMOL/L (ref 3.5–5.1)
POTASSIUM SERPL-SCNC: 4 MMOL/L (ref 3.5–5.1)
PROTHROMBIN TIME: 11.9 SEC (ref 10–13.2)
RBC # BLD: 4.41 M/UL (ref 4.2–5.9)
RBC # BLD: 4.84 M/UL (ref 4.2–5.9)
SODIUM BLD-SCNC: 133 MMOL/L (ref 136–145)
SODIUM BLD-SCNC: 138 MMOL/L (ref 136–145)
TOTAL PROTEIN: 6.9 G/DL (ref 6.4–8.2)
TOTAL PROTEIN: 7.5 G/DL (ref 6.4–8.2)
TROPONIN: 0.06 NG/ML
WBC # BLD: 11.2 K/UL (ref 4–11)
WBC # BLD: 13 K/UL (ref 4–11)

## 2021-06-26 PROCEDURE — 85025 COMPLETE CBC W/AUTO DIFF WBC: CPT

## 2021-06-26 PROCEDURE — B2111ZZ FLUOROSCOPY OF MULTIPLE CORONARY ARTERIES USING LOW OSMOLAR CONTRAST: ICD-10-PCS | Performed by: INTERNAL MEDICINE

## 2021-06-26 PROCEDURE — 92941 PRQ TRLML REVSC TOT OCCL AMI: CPT | Performed by: INTERNAL MEDICINE

## 2021-06-26 PROCEDURE — 02703ZZ DILATION OF CORONARY ARTERY, ONE ARTERY, PERCUTANEOUS APPROACH: ICD-10-PCS | Performed by: INTERNAL MEDICINE

## 2021-06-26 PROCEDURE — 2100000000 HC CCU R&B

## 2021-06-26 PROCEDURE — 33967 INSERT I-AORT PERCUT DEVICE: CPT

## 2021-06-26 PROCEDURE — 99223 1ST HOSP IP/OBS HIGH 75: CPT | Performed by: INTERNAL MEDICINE

## 2021-06-26 PROCEDURE — 2580000003 HC RX 258: Performed by: INTERNAL MEDICINE

## 2021-06-26 PROCEDURE — C1769 GUIDE WIRE: HCPCS

## 2021-06-26 PROCEDURE — 80053 COMPREHEN METABOLIC PANEL: CPT

## 2021-06-26 PROCEDURE — 2720000010 HC SURG SUPPLY STERILE

## 2021-06-26 PROCEDURE — 33967 INSERT I-AORT PERCUT DEVICE: CPT | Performed by: INTERNAL MEDICINE

## 2021-06-26 PROCEDURE — C1889 IMPLANT/INSERT DEVICE, NOC: HCPCS

## 2021-06-26 PROCEDURE — C1894 INTRO/SHEATH, NON-LASER: HCPCS

## 2021-06-26 PROCEDURE — 93458 L HRT ARTERY/VENTRICLE ANGIO: CPT | Performed by: INTERNAL MEDICINE

## 2021-06-26 PROCEDURE — 2500000003 HC RX 250 WO HCPCS: Performed by: INTERNAL MEDICINE

## 2021-06-26 PROCEDURE — 93005 ELECTROCARDIOGRAM TRACING: CPT | Performed by: INTERNAL MEDICINE

## 2021-06-26 PROCEDURE — 2709999900 HC NON-CHARGEABLE SUPPLY

## 2021-06-26 PROCEDURE — 99233 SBSQ HOSP IP/OBS HIGH 50: CPT | Performed by: INTERNAL MEDICINE

## 2021-06-26 PROCEDURE — 6370000000 HC RX 637 (ALT 250 FOR IP): Performed by: INTERNAL MEDICINE

## 2021-06-26 PROCEDURE — B2151ZZ FLUOROSCOPY OF LEFT HEART USING LOW OSMOLAR CONTRAST: ICD-10-PCS | Performed by: INTERNAL MEDICINE

## 2021-06-26 PROCEDURE — 99291 CRITICAL CARE FIRST HOUR: CPT | Performed by: INTERNAL MEDICINE

## 2021-06-26 PROCEDURE — 99153 MOD SED SAME PHYS/QHP EA: CPT

## 2021-06-26 PROCEDURE — 85610 PROTHROMBIN TIME: CPT

## 2021-06-26 PROCEDURE — 93010 ELECTROCARDIOGRAM REPORT: CPT | Performed by: INTERNAL MEDICINE

## 2021-06-26 PROCEDURE — 4A023N7 MEASUREMENT OF CARDIAC SAMPLING AND PRESSURE, LEFT HEART, PERCUTANEOUS APPROACH: ICD-10-PCS | Performed by: INTERNAL MEDICINE

## 2021-06-26 PROCEDURE — 92941 PRQ TRLML REVSC TOT OCCL AMI: CPT

## 2021-06-26 PROCEDURE — 84100 ASSAY OF PHOSPHORUS: CPT

## 2021-06-26 PROCEDURE — C8929 TTE W OR WO FOL WCON,DOPPLER: HCPCS

## 2021-06-26 PROCEDURE — 5A02210 ASSISTANCE WITH CARDIAC OUTPUT USING BALLOON PUMP, CONTINUOUS: ICD-10-PCS | Performed by: INTERNAL MEDICINE

## 2021-06-26 PROCEDURE — 93458 L HRT ARTERY/VENTRICLE ANGIO: CPT

## 2021-06-26 PROCEDURE — 99282 EMERGENCY DEPT VISIT SF MDM: CPT

## 2021-06-26 PROCEDURE — 85347 COAGULATION TIME ACTIVATED: CPT

## 2021-06-26 PROCEDURE — 36415 COLL VENOUS BLD VENIPUNCTURE: CPT

## 2021-06-26 PROCEDURE — 6360000002 HC RX W HCPCS: Performed by: INTERNAL MEDICINE

## 2021-06-26 PROCEDURE — 99152 MOD SED SAME PHYS/QHP 5/>YRS: CPT

## 2021-06-26 PROCEDURE — 84484 ASSAY OF TROPONIN QUANT: CPT

## 2021-06-26 PROCEDURE — 83735 ASSAY OF MAGNESIUM: CPT

## 2021-06-26 PROCEDURE — C1725 CATH, TRANSLUMIN NON-LASER: HCPCS

## 2021-06-26 PROCEDURE — 99254 IP/OBS CNSLTJ NEW/EST MOD 60: CPT | Performed by: THORACIC SURGERY (CARDIOTHORACIC VASCULAR SURGERY)

## 2021-06-26 PROCEDURE — 93005 ELECTROCARDIOGRAM TRACING: CPT | Performed by: EMERGENCY MEDICINE

## 2021-06-26 PROCEDURE — 6360000004 HC RX CONTRAST MEDICATION: Performed by: INTERNAL MEDICINE

## 2021-06-26 RX ORDER — EPTIFIBATIDE 0.75 MG/ML
2 INJECTION, SOLUTION INTRAVENOUS CONTINUOUS
Status: DISCONTINUED | OUTPATIENT
Start: 2021-06-26 | End: 2021-06-26

## 2021-06-26 RX ORDER — LORAZEPAM 2 MG/ML
4 INJECTION INTRAMUSCULAR
Status: DISCONTINUED | OUTPATIENT
Start: 2021-06-26 | End: 2021-06-28

## 2021-06-26 RX ORDER — LORAZEPAM 2 MG/ML
2 INJECTION INTRAMUSCULAR
Status: DISCONTINUED | OUTPATIENT
Start: 2021-06-26 | End: 2021-06-28

## 2021-06-26 RX ORDER — ACETAMINOPHEN 325 MG/1
650 TABLET ORAL EVERY 4 HOURS PRN
Status: DISCONTINUED | OUTPATIENT
Start: 2021-06-26 | End: 2021-06-28

## 2021-06-26 RX ORDER — ATORVASTATIN CALCIUM 80 MG/1
80 TABLET, FILM COATED ORAL NIGHTLY
Status: DISCONTINUED | OUTPATIENT
Start: 2021-06-26 | End: 2021-06-28

## 2021-06-26 RX ORDER — SODIUM CHLORIDE 0.9 % (FLUSH) 0.9 %
5-40 SYRINGE (ML) INJECTION EVERY 12 HOURS SCHEDULED
Status: DISCONTINUED | OUTPATIENT
Start: 2021-06-26 | End: 2021-06-28

## 2021-06-26 RX ORDER — M-VIT,TX,IRON,MINS/CALC/FOLIC 27MG-0.4MG
1 TABLET ORAL DAILY
Status: DISCONTINUED | OUTPATIENT
Start: 2021-06-26 | End: 2021-07-02 | Stop reason: HOSPADM

## 2021-06-26 RX ORDER — SODIUM CHLORIDE 9 MG/ML
25 INJECTION, SOLUTION INTRAVENOUS PRN
Status: DISCONTINUED | OUTPATIENT
Start: 2021-06-26 | End: 2021-06-28

## 2021-06-26 RX ORDER — LORAZEPAM 1 MG/1
3 TABLET ORAL
Status: DISCONTINUED | OUTPATIENT
Start: 2021-06-26 | End: 2021-06-28

## 2021-06-26 RX ORDER — LORAZEPAM 1 MG/1
2 TABLET ORAL
Status: DISCONTINUED | OUTPATIENT
Start: 2021-06-26 | End: 2021-06-28

## 2021-06-26 RX ORDER — FLUOXETINE HYDROCHLORIDE 20 MG/1
20 CAPSULE ORAL DAILY
Status: DISCONTINUED | OUTPATIENT
Start: 2021-06-26 | End: 2021-06-26

## 2021-06-26 RX ORDER — PANTOPRAZOLE SODIUM 40 MG/1
40 TABLET, DELAYED RELEASE ORAL
Status: DISCONTINUED | OUTPATIENT
Start: 2021-06-26 | End: 2021-06-28

## 2021-06-26 RX ORDER — SODIUM CHLORIDE 0.9 % (FLUSH) 0.9 %
5-40 SYRINGE (ML) INJECTION PRN
Status: DISCONTINUED | OUTPATIENT
Start: 2021-06-26 | End: 2021-06-28

## 2021-06-26 RX ORDER — LORAZEPAM 1 MG/1
1 TABLET ORAL
Status: DISCONTINUED | OUTPATIENT
Start: 2021-06-26 | End: 2021-06-28

## 2021-06-26 RX ORDER — MAGNESIUM SULFATE 1 G/100ML
1000 INJECTION INTRAVENOUS PRN
Status: DISCONTINUED | OUTPATIENT
Start: 2021-06-26 | End: 2021-06-28

## 2021-06-26 RX ORDER — LORAZEPAM 2 MG/ML
1 INJECTION INTRAMUSCULAR
Status: DISCONTINUED | OUTPATIENT
Start: 2021-06-26 | End: 2021-06-28

## 2021-06-26 RX ORDER — LORAZEPAM 1 MG/1
4 TABLET ORAL
Status: DISCONTINUED | OUTPATIENT
Start: 2021-06-26 | End: 2021-06-28

## 2021-06-26 RX ORDER — EPTIFIBATIDE 0.75 MG/ML
2 INJECTION, SOLUTION INTRAVENOUS CONTINUOUS
Status: DISCONTINUED | OUTPATIENT
Start: 2021-06-26 | End: 2021-06-27

## 2021-06-26 RX ORDER — NITROGLYCERIN 20 MG/100ML
5-200 INJECTION INTRAVENOUS CONTINUOUS
Status: DISCONTINUED | OUTPATIENT
Start: 2021-06-26 | End: 2021-06-28

## 2021-06-26 RX ORDER — NICOTINE 21 MG/24HR
1 PATCH, TRANSDERMAL 24 HOURS TRANSDERMAL DAILY
Status: DISCONTINUED | OUTPATIENT
Start: 2021-06-26 | End: 2021-06-28

## 2021-06-26 RX ORDER — ASPIRIN 81 MG/1
81 TABLET, CHEWABLE ORAL DAILY
Status: DISCONTINUED | OUTPATIENT
Start: 2021-06-27 | End: 2021-06-27 | Stop reason: SDUPTHER

## 2021-06-26 RX ORDER — ONDANSETRON 2 MG/ML
4 INJECTION INTRAMUSCULAR; INTRAVENOUS EVERY 6 HOURS PRN
Status: DISCONTINUED | OUTPATIENT
Start: 2021-06-26 | End: 2021-06-28

## 2021-06-26 RX ORDER — LANOLIN ALCOHOL/MO/W.PET/CERES
100 CREAM (GRAM) TOPICAL DAILY
Status: DISCONTINUED | OUTPATIENT
Start: 2021-06-26 | End: 2021-07-02 | Stop reason: HOSPADM

## 2021-06-26 RX ORDER — ACETAMINOPHEN 650 MG/1
650 SUPPOSITORY RECTAL EVERY 4 HOURS PRN
Status: DISCONTINUED | OUTPATIENT
Start: 2021-06-26 | End: 2021-06-28

## 2021-06-26 RX ORDER — LORAZEPAM 2 MG/ML
3 INJECTION INTRAMUSCULAR
Status: DISCONTINUED | OUTPATIENT
Start: 2021-06-26 | End: 2021-06-28

## 2021-06-26 RX ADMIN — EPTIFIBATIDE 2 MCG/KG/MIN: 0.75 INJECTION INTRAVENOUS at 13:17

## 2021-06-26 RX ADMIN — EPTIFIBATIDE 2 MCG/KG/MIN: 0.75 INJECTION, SOLUTION INTRAVENOUS at 04:11

## 2021-06-26 RX ADMIN — PHENYLEPHRINE HYDROCHLORIDE 666.67 MCG/MIN: 10 INJECTION INTRAVENOUS at 04:12

## 2021-06-26 RX ADMIN — Medication 10 ML: at 21:57

## 2021-06-26 RX ADMIN — PHENYLEPHRINE HYDROCHLORIDE 50 MCG/MIN: 10 INJECTION INTRAVENOUS at 06:04

## 2021-06-26 RX ADMIN — EPTIFIBATIDE 2 MCG/KG/MIN: 0.75 INJECTION INTRAVENOUS at 19:42

## 2021-06-26 RX ADMIN — ATORVASTATIN CALCIUM 80 MG: 80 TABLET, FILM COATED ORAL at 21:56

## 2021-06-26 RX ADMIN — NITROGLYCERIN 5 MCG/MIN: 20 INJECTION INTRAVENOUS at 21:56

## 2021-06-26 RX ADMIN — EPTIFIBATIDE 2 MCG/KG/MIN: 0.75 INJECTION INTRAVENOUS at 05:12

## 2021-06-26 RX ADMIN — PERFLUTREN 1.65 MG: 6.52 INJECTION, SUSPENSION INTRAVENOUS at 10:23

## 2021-06-26 ASSESSMENT — PAIN SCALES - GENERAL
PAINLEVEL_OUTOF10: 0

## 2021-06-26 ASSESSMENT — HEART SCORE: ECG: 2

## 2021-06-26 NOTE — PROCEDURES
CARDIAC CATHETERIZATION REPORT     Procedure Date:  2021  Patient Name: Flower Kirk  MRN: [id number] : 1975      INDICATION     Acute inferolateral STEMI    PROCEDURES PERFORMED     Left heart catheterization  LVgram  Coronary angiogam  Coronary cath  Monitoring of moderate conscious sedation  Intra-aortic balloon pump placement  PTA of LCx        PROCEDURE DESCRIPTION     This was felt to be an emergency procedure. Patient was prepped draped in the usual sterile fashion. Local anaesthetic was applied over puncture site. Using a back wall technique, a 6 Pakistani Terumo sheath was inserted into right radial artery. Verapamil, nitroglycerin, nicardipine were administered through the sheath. Heparin was administered. Diagnostic 5 English pigtail, ultra, belalmy catheters were used for diagnostic angiograms. Pigtail was used for left ventricular angiogram, ultra was used for RCA angiography, Marny Figures was used for left-sided coronary geography. Attention then turned towards PCI as noted below. Immediately after PCI, using fluoroscopic and ultrasound guidance, as well as a micropuncture kit, a 6 Western Vanessa sheath was inserted into the right common femoral artery and this was ultimately upsized to an 8 Western Vanessa sheath for intra-aortic balloon pump placement which was advanced just into the thoracic descending aorta. Balloon was functioning appropriately good augmentation pressures noted. At the conclusion of the procedure, a TR band was placed over the puncture site and hemostasis was obtained. There were no immediate complications. I supervised sedation with versed 4 mg/fentanyl 200 mcg during the procedure. 210 cc contrast was utilized. <20cc EBL.       FINDINGS       LVGRAM    LVEDP  9   GRADIENT ACROSS AORTIC VALVE  none   LV FUNCTION EF 60%   WALL MOTION  normal   MITRAL REGURGITATION  mild         CORONARY ARTERIES    LM Long, large vessel, proximal-mid less than 10% stenosis, distal vessel has tapering with 50 to 60% stenosis. LAD  Ostial/proximal 80% stenosis. Mid-distal 60% stenosis. D1 is a small to medium size vessel with proximal-mid 75% stenosis. LCX Markedly tortuous vessel particularly at ostium and in the proximal segments, there is 50 to 60% stenosis followed by mid-distal 99% stenosis with LAILA II flow. RI  This vessel could also be described as a high OM1 with 40-50 stent proximal-mid stenosis. RCA Dominant cough tortuous, calcified, proximal 50% stenosis, mid 50% stenosis, distal 90% stenosis. PERCUTANEOUS INTERVENTION DESCRIPTION     Heparin was used for anticoagulation, patient had already been preloaded with Brilinta. Integrilin was given during the procedure and was ordered for continuous infusion as patient may ultimately require CABG. Initially a 6 [place] Islander mL 3.5 guiding catheter was taken and this was used to intubate the left main. A choice floppy wire was initially taken however due to severe circumflex tortuosity, there was difficulty with wiring the circumflex lesion which was felt to be the culprit for patient's current presentation. As such a [employer] dual lumen microcatheter was taken and the choice floppy wire as well as a Medtronic cougar wire were taken through this. The choice floppy wire was advanced with the rapid exchange port and the Medtronic cougar was advanced through the proximal port. With these wires in place including with the choice wire in the high OM/ramus artery, the Medtronic cougar wire was able to be manipulated into the circumflex and across the culprit lesion. Then the choice wire was removed and using a Trapper balloon, the microcatheter was also able to be removed. With a Medtronic cougar wire in place, a 2.5 mm balloon was able to be utilized to perform angioplasty of the circumflex. With that there was improvement in flow from LAILA II to LAILA-3. There is 20% residual stenosis.   Patient's EKG improved. There is no further chest pain. Residual CAD/ASHD was felt to be best treated with CABG and CT surgery consultation has been placed. Findings/plans were discussed with patient and wife, they understand the gravity situation that there is a high risk of deterioration and complications which include with extensive CAD. They understand and wish to proceed with plans as outlined. Difficulty in wiring coronary led to delay in PTA/PCI. CONCLUSIONS:     Successful PTA of circumflex  Successful intra-aortic balloon pump placement    Multivessel CAD/ASHD  Refer to CT surgery for consideration of CABG  If not felt to be a candidate for CABG, can consider high risk multivessel PCI.

## 2021-06-26 NOTE — PROGRESS NOTES
Continuing to release 2mls of air every 15 minutes.      0530: 13ml  0545: 11ml  0600:  11ml (small hematoma)  0615: 9ml (unchanged site)  0630: 11ml (Hematoma increased in size)  0645: 9mL (site unchanged)  0700:   0715:

## 2021-06-26 NOTE — PROGRESS NOTES
Dr. Maribel Velez at bedside to see patient, assessed small hematoma at right radial cath site, continue to monitor no new orders at this time.

## 2021-06-26 NOTE — PROGRESS NOTES
Began removing 2mls of air from patient's right TR band. Will continue to remove 2mls every 15 minutes as patient tolerates. Will continue to monitor.

## 2021-06-26 NOTE — PROGRESS NOTES
Patient arrived to  with right femoral balloon pump. Cath Lab nurse at bedside to perform handoff report. Patient is alert and oriented and denies pain at this time. Patient has right radial cath site. Ordered to release 2mls of air every 15 minutes as patient tolerates. No signs of oozing or bleeding and right radial site. Balloon pump running appropriately on a 1:1 rhythm. Distal Pedal and posterior tibial pulses palpable on left and right lower extremity.

## 2021-06-26 NOTE — ED PROVIDER NOTES
CHIEF COMPLAINT  Chest Pain (STEMI. Flown to [facility] cath lab. See downtime charting)      HISTORY OF PRESENT ILLNESS  Dominique Ríos is a 41 y.o. male presents to the ED with chest pain, started around 11 PM tonight, reports he was racing tonight when it started, radiates to bilateral shoulders/neck/jaw, 9/10, has never had pain like this before, no strenuous activity, reports the race was no more stressful than normal, he states he has a history of high blood pressure, but has not been treated for at least a couple years, does not see a primary care doctor, he is a smoker, also uses smokeless tobacco, states he drank about 4 Jolly light tonight as well, denies any other drug use, no stimulant/meth/cocaine use, he denies any known cardiac disease, no history of DVT or PE. Denies fevers, no cough or Covid concerns, no headache or neck stiffness, no abdominal pain/nausea/vomiting/bowel or bladder changes, he is not on any chronic medications at this time, has not taken anything for symptoms, no other complaints, modifying factors or associated symptoms. I have reviewed the following from the nursing documentation. Past Medical History:   Diagnosis Date    Back pain     Gastroesophageal reflux disease without esophagitis 12/18/2015    Hypertension     Osteoarthritis      No past surgical history on file.   Family History   Problem Relation Age of Onset    Diabetes Maternal Grandmother     Heart Disease Maternal Grandmother      Social History     Socioeconomic History    Marital status:      Spouse name: Not on file    Number of children: Not on file    Years of education: Not on file    Highest education level: Not on file   Occupational History    Not on file   Tobacco Use    Smoking status: Current Some Day Smoker     Packs/day: 1.00    Smokeless tobacco: Current User     Types: Snuff    Tobacco comment: pt is down to 2 cigarettes a day   Substance and Sexual Activity    Alcohol use: Yes     Alcohol/week: 42.0 standard drinks     Types: 42 Cans of beer per week     Comment: 6 beers a day     Drug use: No    Sexual activity: Yes     Partners: Female   Other Topics Concern    Not on file   Social History Narrative    Not on file     Social Determinants of Health     Financial Resource Strain:     Difficulty of Paying Living Expenses:    Food Insecurity:     Worried About Running Out of Food in the Last Year:     Ran Out of Food in the Last Year:    Transportation Needs:     Lack of Transportation (Medical):  Lack of Transportation (Non-Medical):    Physical Activity:     Days of Exercise per Week:     Minutes of Exercise per Session:    Stress:     Feeling of Stress :    Social Connections:     Frequency of Communication with Friends and Family:     Frequency of Social Gatherings with Friends and Family:     Attends Mu-ism Services:     Active Member of Clubs or Organizations:     Attends Club or Organization Meetings:     Marital Status:    Intimate Partner Violence:     Fear of Current or Ex-Partner:     Emotionally Abused:     Physically Abused:     Sexually Abused:      No current facility-administered medications for this encounter. No current outpatient medications on file. Allergies   Allergen Reactions    Penicillins Other (See Comments)     Reports mother told him he was allergic to PCN. REVIEW OF SYSTEMS  10 systems reviewed, pertinent positives per HPI otherwise noted to be negative. PHYSICAL EXAM  There were no vitals taken for this visit. See downtime charting for vitals  GENERAL APPEARANCE: Awake and alert. Cooperative. No acute distress, but appears unwell, and appears older than stated age  HEAD: Normocephalic. Atraumatic. EYES: PERRL. EOM's grossly intact. ENT: Mucous membranes are moist.  Airway patent, no stridor  NECK: Supple. No rigidity, no JVD  HEART: RRR.  No murmurs, no significant chest wall tenderness  LUNGS: Respirations nonlabored. Lungs are clear to auscultation bilaterally. ABDOMEN: Soft. Non-distended. Non-tender. No guarding or rebound. EXTREMITIES: No peripheral edema. Moves all extremities equally. No calf tenderness or asymmetry  SKIN: Warm and dry. No acute rashes. Appears pale/gray  NEUROLOGICAL: Alert and oriented. No gross facial drooping. Strength 5/5, sensation intact. No truncal ataxia. Normal speech, steady gait  PSYCHIATRIC: Normal mood and affect. LABS  I have reviewed all labs for this visit.    Results for orders placed or performed during the hospital encounter of 06/26/21   Comprehensive Metabolic Panel   Result Value Ref Range    Sodium 138 136 - 145 mmol/L    Potassium 3.7 3.5 - 5.1 mmol/L    Chloride 103 99 - 110 mmol/L    CO2 22 21 - 32 mmol/L    Anion Gap 13 3 - 16    Glucose 129 (H) 70 - 99 mg/dL    BUN 13 7 - 20 mg/dL    CREATININE 1.1 0.9 - 1.3 mg/dL    GFR Non-African American >60 >60    GFR African American >60 >60    Calcium 9.4 8.3 - 10.6 mg/dL    Total Protein 7.5 6.4 - 8.2 g/dL    Albumin 4.8 3.4 - 5.0 g/dL    Albumin/Globulin Ratio 1.8 1.1 - 2.2    Total Bilirubin 0.4 0.0 - 1.0 mg/dL    Alkaline Phosphatase 89 40 - 129 U/L    ALT 57 (H) 10 - 40 U/L    AST 32 15 - 37 U/L    Globulin 2.7 g/dL   Magnesium   Result Value Ref Range    Magnesium 2.10 1.80 - 2.40 mg/dL   CBC Auto Differential   Result Value Ref Range    WBC 13.0 (H) 4.0 - 11.0 K/uL    RBC 4.84 4.20 - 5.90 M/uL    Hemoglobin 15.6 13.5 - 17.5 g/dL    Hematocrit 46.2 40.5 - 52.5 %    MCV 95.3 80.0 - 100.0 fL    MCH 32.3 26.0 - 34.0 pg    MCHC 33.9 31.0 - 36.0 g/dL    RDW 13.0 12.4 - 15.4 %    Platelets 288 997 - 197 K/uL    MPV 9.4 5.0 - 10.5 fL    Neutrophils % 75.5 %    Lymphocytes % 14.3 %    Monocytes % 7.7 %    Eosinophils % 1.4 %    Basophils % 1.1 %    Neutrophils Absolute 9.8 (H) 1.7 - 7.7 K/uL    Lymphocytes Absolute 1.9 1.0 - 5.1 K/uL    Monocytes Absolute 1.0 0.0 - 1.3 K/uL    Eosinophils Absolute 0.2 0.0 - 0.6 K/uL    Basophils Absolute 0.1 0.0 - 0.2 K/uL   Troponin   Result Value Ref Range    Troponin 0.06 (H) <0.01 ng/mL       EKG  The Ekg interpreted by me shows  STEMI, lateral, with reciprocal changes/T wave inversion in lead III, rate 85, QTc 411, normal axis    RADIOLOGY  ED physician CXR interpretation: Mediastinum is normal, no widening, No infiltrate, No effusion, No cardiomegaly and No pneumothorax, bony structures appear intact. Heart Score:  History: 2  EC  Patient Age: 1  *Risk factors for Atherosclerotic disease: Cigarette smoking; Hypertension  Risk Factors: 1  Troponin: 1  Heart Score Total: 6      ED COURSE/MDM  Patient seen and evaluated. Old records reviewed. Labs and imaging reviewed and results discussed with patient. Patient was seen during downtime, see downtime charting. 70-year-old male with sudden onset chest pain, EKG meeting STEMI criteria, lateral leads, I immediately called interventional cardiology when I saw EKG, Dr. Aly Holcomb reviewed EKG and agreed to activate Cath Lab, patient was significantly hypertensive on arrival as well with blood pressure over 200/100, I had given 325 mg aspirin, sublingual nitro, and Dr. Aly Holcomb recommended loading dose of Brilinta, heparin drip and nitro drip, which were all initiated prior to arrival of helicopter transport, he was still having chest pain, minimal improvement after meds, and air care personnel offered to give him some fentanyl which I agreed was okay, blood pressure had improved with meds though still hypertensive, vital signs otherwise stable, chest x-ray with no acute process, his initial troponin was found to be 0.06 after the patient was in route via air care helicopter to [facility], patient/wife verbalized understanding of plan to be transferred for heart cath.     Orders Placed This Encounter   Procedures    Comprehensive Metabolic Panel    Magnesium    CBC Auto Differential    Troponin       The total critical care time spent while evaluating and treating this patient was 32 minutes. This excludes time spent doing separately billable procedures. This includes time at the bedside, data interpretation, medication management, obtaining critical history from collateral sources if the patient is unable to provide it directly, and physician consultation. Specifics of interventions taken and potentially life-threatening diagnostic considerations are listed in the medical decision making. CLINICAL IMPRESSION  1. ST elevation myocardial infarction (STEMI), unspecified artery (HCC)    2. Chest pain, unspecified type    3. Uncontrolled hypertension    4. Smoker        DISPOSITION  Chintan Yañez was transferred via air care to [facility] in stable/guarded condition.                     Jennifer Santos,   06/26/21 [id number]

## 2021-06-26 NOTE — LETTER
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      Sincerely,  Whitney Larsen, CNP

## 2021-06-26 NOTE — PROGRESS NOTES
Consultation H&P    Date of Admission:  6/26/2021  2:40 AM  Date of Consultation:  6/26/2021    PCP:  LULU Henderson CNP      Chief Complaint:     History of Present Illness: We are asked to see this patient in consultation by Dr. jeanette Park Cortez is a 41 y.o. male who chest pain sweating inferolateral part of the chest transferred for non-ST MI cath showed complex coronary artery disease underwent to angioplasty referred for surgical intervention after balloon placement  Past Medical History:  Past Medical History:   Diagnosis Date    Alcohol abuse 6/26/2021    Back pain     Gastroesophageal reflux disease without esophagitis 12/18/2015    Hypertension     Osteoarthritis        Past Surgical History:  History reviewed. No pertinent surgical history. Home Medications:   Prior to Admission medications    Medication Sig Start Date End Date Taking?  Authorizing Provider   FLUoxetine (PROZAC) 20 MG capsule Take 1 capsule by mouth daily 2/22/18   LULU Henderson CNP   naproxen-esomeprazole (VIMOVO) 500-20 MG TBEC Take 1 tablet by mouth 2 times daily 11/2/17 12/2/17  Rebel Lane MD   diclofenac 2 % SOLN Place 40 mg onto the skin 2 times daily PRN 11/2/17   Rebel Lane MD   predniSONE (DELTASONE) 10 MG tablet Take 40 mg for 3 days then 30 mg for 3 days then 20 mg for 3 days then 10 mg for 3 days 10/6/17   LULU Henderson CNP   PARoxetine (PAXIL) 20 MG tablet Take 2 tablets by mouth daily 20 mg qd for 2 weeks, then increase to 40 mg 3/2/17   LULU Giron CNP   loratadine (CLARITIN) 10 MG tablet Take 1 tablet by mouth daily 3/2/17   LULU Shrestha CNP   omeprazole (PRILOSEC) 20 MG capsule Take 1 capsule by mouth Daily 12/15/15   LULU Shrestha CNP        Facility Administered Medications:    sodium chloride flush  5-40 mL Intravenous 2 times per day    [START ON 6/27/2021] aspirin  81 mg Oral Daily    atorvastatin  80 mg Oral Nightly    FLUoxetine  20 mg Oral Daily    pantoprazole  40 mg Oral QAM AC    thiamine  100 mg Oral Daily    multivitamin  1 tablet Oral Daily    nicotine  1 patch Transdermal Daily       Allergies: Allergies   Allergen Reactions    Penicillins Other (See Comments)     Reports mother told him he was allergic to PCN. Social History:    Working:   Caffeine:   Lifestyle:    Social History     Socioeconomic History    Marital status:      Spouse name: Not on file    Number of children: Not on file    Years of education: Not on file    Highest education level: Not on file   Occupational History    Not on file   Tobacco Use    Smoking status: Current Some Day Smoker     Packs/day: 1.00    Smokeless tobacco: Current User     Types: Snuff    Tobacco comment: pt is down to 2 cigarettes a day   Substance and Sexual Activity    Alcohol use: Yes     Alcohol/week: 42.0 standard drinks     Types: 42 Cans of beer per week     Comment: 6 beers a day     Drug use: No    Sexual activity: Yes     Partners: Female   Other Topics Concern    Not on file   Social History Narrative    Not on file     Social Determinants of Health     Financial Resource Strain:     Difficulty of Paying Living Expenses:    Food Insecurity:     Worried About Running Out of Food in the Last Year:     Ran Out of Food in the Last Year:    Transportation Needs:     Lack of Transportation (Medical):      Lack of Transportation (Non-Medical):    Physical Activity:     Days of Exercise per Week:     Minutes of Exercise per Session:    Stress:     Feeling of Stress :    Social Connections:     Frequency of Communication with Friends and Family:     Frequency of Social Gatherings with Friends and Family:     Attends Holiness Services:     Active Member of Clubs or Organizations:     Attends Club or Organization Meetings:     Marital Status:    Intimate Partner Violence:     Fear of Current or Ex-Partner:     Emotionally Abused:     Physically Abused:     Sexually Abused:        Family History:  Heart Disease:   Stroke:   Cancer:   Diabetes:   Hypertension:   Aneurysm/PVD:         Problem Relation Age of Onset    Diabetes Maternal Grandmother     Heart Disease Maternal Grandmother        Review of Systems:  Constitutional:  No night sweats, headaches, weight loss. Eyes:  No glaucoma, cataracts. ENMT:  No nosebleeds, deviated septum. Cardiac:  No arrhythmias, previous MI. Vascular:  No claudication, varicosities. GI:  No PUD, heartburn. :  No kidney stones, frequent UTIs  Musculoskeletal:  No arthritis, gout. Respiratory:  No SOB, emphysema, asthma. Integumentary:  No dermatitis, itching, rash. Neurological:  No stroke, TIAs, seizures. Psychiatric:  No depression, anxiety. Endocrine: No diabetes, thyroid issues. Hematologic:  No bleeding, easy bruising. Immunologic:  No known cancer, steroid therapies. Physical Examination:    /81   Pulse 76   Temp 97.8 °F (36.6 °C) (Oral)   Resp 13   Ht 5' 9\" (1.753 m)   Wt 220 lb 10.9 oz (100.1 kg)   SpO2 96%   BMI 32.59 kg/m²      BP RUE:  BP LUE:   Admission Weight: 220 lb 10.9 oz (100.1 kg)   Hand dominance:    General appearance: NAD, well nourished  Eyes: anicteric, PERRLA  ENMT: no scars or lesions, no nasal deformity, normal dentition, no cyanosis of oral mucosa  Neck: no masses, no thyroid enlargement, no JVD. Respiratory: effort is unlabored, symmetric, no crackles, wheezes or rubs. No palpable/percussable abnormalities. Cardiovascular: regular, no murmur. PMI normal, no thrill. No carotid bruits. No edema or varicosities. Abdominal aorta cannot be appreciated given body habitus. GI: abdomen soft, nondistended, no organomegaly. No masses. Lymphatic: no cervical/supraclavicular adenopathy  Musculoskeletal: strength and tone normal. Full ROM. No scoliosis. Extremities: warm and pink. No clubbing or petechiae.   Skin: no dermatitis or Iván Ward, UROBILINOGEN, BILIRUBINUR, BLOODU, GLUCOSEU, AMORPHOUS    History obtained: chart, pt    Risk factors: Hypertension, left main, smoker, hypercholesteremia    STS Cardiac Surgery Risk profile:  CABG x4     Mortality:   Morbidity and Mortality:      Diagnosis: Complex coronary artery disease with left main    Plan:   Coronary artery bypass Tuesday or Monday  Echo  Carotid        Typical periop/postop course reviewed including initial limitations on driving/heavy lifting. Risks, benefits and postoperative complications discussed including bleeding, infection, stroke, death, postop pulmonary and renal issues. I spent 60 minutes of care and visit with this patient, greater than 50% of time was spent in counseling and coordinating care, image interpretation, discussion with other caregiver.   And future planning    Philly Cole MD FACS

## 2021-06-26 NOTE — LETTER
Sumaya Rojas accompanied Keith Cordero to the hospital on 6/26/2021. If you have any questions or concerns, please don't hesitate to call.       Electronically signed by Una Sargent RN on 6/26/2021 at 10:28 AM

## 2021-06-26 NOTE — PROGRESS NOTES
Releasing 2 mls of air every 15 min. Currently 9mls of air left in TR band. Held for 15 more minutes. Patient has small area of firmness that could be possible hematoma proximal to patient's TR band puncture site. Palpable pulse distal to TR band and patient denies pain. Will continue to monitor.
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
## 2021-06-26 NOTE — PROGRESS NOTES
RN completed patient's home medication list. Pt denied taking any of the medications listed on med rec.
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
## 2021-06-26 NOTE — PROGRESS NOTES
Brief Pre-Op Note/Sedation Assessment      Cole Hart  1975  Cath Pool Rm/NONE      [id number]  4:09 AM    Planned Procedure: Cardiac Catheterization Procedure    Post Procedure Plan: Return to same level of care    Consent: Consent was unable to be obtained due to patient's condition. This was an emergency procedure. Chief Complaint: STEMI      Indications for Cath Procedure:  ACS <= 24 hrs  Anginal Classification within 2 weeks:  CCS IV - Inability to perform any activity without angina or angina at rest, i.e., severe limitation  NYHA Heart Failure Class within 2 weeks: No symptoms  Is Cath Lab Visit Valve-related?: No  Surgical Risk: N/A  Functional Type: N/A    Anti- Anginal Meds within 2 weeks:   Yes: Aspirin    Stress or Imaging Studies Performed (within 6 months):  None     Vital Signs:  200/100,     Allergies: Allergies   Allergen Reactions    Penicillins Other (See Comments)     Reports mother told him he was allergic to PCN. Past Medical History:  Past Medical History:   Diagnosis Date    Back pain     Gastroesophageal reflux disease without esophagitis 12/18/2015    Hypertension     Osteoarthritis          Surgical History:  No past surgical history on file. Medications:  No current facility-administered medications for this encounter. Pre-Sedation:    Pre-Sedation Documentation and Exam:  I have personally completed a history, physical exam & review of systems for this patient (see notes). Prior History of Anesthesia Complications:   none    Modified Mallampati:  III (soft palate, base of uvula visible)    ASA Classification:  Class 4 - A patient with an incapacitating systemic disease that is a constant threat to life      Remberto Scale:   Activity:  2 - Able to move 4 extremities voluntarily on command  Respiration:  1 - Dyspnic, shallow, or limited breathing  Circulation:  0 - BP more than +/- 50mmHg of normal  Consciousness:  2 - Fully awake  Oxygen Saturation (color):  1 - Needs oxygen to maintain oxygen saturation >90%    Sedation/Anesthesia Plan:  Guard the patient's safety and welfare. Minimize physical discomfort and pain. Minimize negative psychological responses to treatment by providing sedation and analgesia and maximize the potential amnesia. Patient to meet pre-procedure discharge plan.     Medication Planned:  midazolam intravenously and fentanyl intravenously    Patient is an appropriate candidate for plan of sedation: yes      Electronically signed by Josiane Mauricio MD on 6/26/2021 at 4:09 AM

## 2021-06-26 NOTE — H&P
Hospital Medicine History & Physical      PCP: LULU Carvalho CNP    Date of Admission: 6/26/2021    Date of Service: Pt seen/examined on 06/26/21 and Admitted to Inpatient with expected LOS greater than two midnights due to medical therapy. Chief Complaint:  Chest pain      History Of Present Illness:    41 y.o. male who presented to [facility] with chest pain. Patient developed severe chest pain last night during a car race. Pain was sternal and radiated to his shoulders, neck and jaw. He had never had pain like this before. Patient is an active smoker and drinks about 6 beers per day. He previously was told he has high blood pressure but has not been on medication for this. He denies fevers, chills, SOB, nausea or diarrhea. Past Medical History:          Diagnosis Date    Alcohol abuse 6/26/2021    Back pain     Gastroesophageal reflux disease without esophagitis 12/18/2015    Hypertension     Osteoarthritis        Past Surgical History:      History reviewed. No pertinent surgical history. Medications Prior to Admission:      Prior to Admission medications    Medication Sig Start Date End Date Taking?  Authorizing Provider   FLUoxetine (PROZAC) 20 MG capsule Take 1 capsule by mouth daily 2/22/18   LULU Carvalho CNP   naproxen-esomeprazole (VIMOVO) 500-20 MG TBEC Take 1 tablet by mouth 2 times daily 11/2/17 12/2/17  Bianca Flaherty MD   diclofenac 2 % SOLN Place 40 mg onto the skin 2 times daily PRN 11/2/17   Bianca Flaherty MD   predniSONE (DELTASONE) 10 MG tablet Take 40 mg for 3 days then 30 mg for 3 days then 20 mg for 3 days then 10 mg for 3 days 10/6/17   LULU Carvalho CNP   diclofenac (VOLTAREN) 75 MG EC tablet Take 1 tablet by mouth 2 times daily 10/6/17   LULU Carvalho CNP   PARoxetine (PAXIL) 20 MG tablet Take 2 tablets by mouth daily 20 mg qd for 2 weeks, then increase to 40 mg 3/2/17   LULU Maher CNP No rashes or lesions. Neurologic:  Neurovascularly intact without any focal sensory/motor deficits. Cranial nerves: II-XII intact, grossly non-focal.  Psychiatric:  Alert and oriented, thought content appropriate, normal insight  Capillary Refill: Brisk,3 seconds, normal  Peripheral Pulses: +2 palpable, equal bilaterally       Labs:     Recent Labs     06/26/21  0151 06/26/21  0608   WBC 13.0* 11.2*   HGB 15.6 14.6   HCT 46.2 40.4*    177     Recent Labs     06/26/21  0151 06/26/21  0608    133*   K 3.7 4.0    103   CO2 22 22   BUN 13 12   CREATININE 1.1 1.0   CALCIUM 9.4 9.1   PHOS  --  3.0     Recent Labs     06/26/21  0151 06/26/21  0608   AST 32 129*   ALT 57* 65*   BILITOT 0.4 0.7   ALKPHOS 89 80     Recent Labs     06/26/21  0608   INR 1.03     Recent Labs     06/26/21 0151   TROPONINI 0.06*       Urinalysis:    No results found for: Carolyn Peter, [address], BACTERIA, RBCUA, BLOODU, [address], GLUCOSEU    Radiology:     CXR: I have reviewed the CXR with the following interpretation: no acute disease  EKG:  I have reviewed the EKG with the following interpretation: STEMI    XR CHEST PORTABLE   Preliminary Result   Negative portable chest.             ASSESSMENT:    Active Hospital Problems    Diagnosis Date Noted    STEMI (ST elevation myocardial infarction) ([facility] [address] [I21.3] 06/26/2021    Essential hypertension [I10] 06/26/2021    Alcohol abuse [F10.10] 06/26/2021    ST elevation myocardial infarction (STEMI) ([facility] [address] [I21.3]     Tobacco use [Z72.0] 08/16/2013         PLAN:  STEMI  - cardiology consulted  - s/p [facility] with severe multivessel disease  - echo ordered  - CT surgery consulted  - plan for CABG next week  - continue integrelin, ASA, lipitor,     HLD  - started on statin    Alcohol dependence  - continue to monitor for withdrawal. [facility] protocol  - vitamins ordered  - counseling given    Tobacco dependence  - counseling given.  Nicotine replacement ordered    Obesity  With Body mass index is 32.59 kg/m². Complicating assessment and treatment. Placing patient at risk for multiple co-morbidities as well as early death and contributing to the patient's presentation. Counseled on weight loss. DVT Prophylaxis: Integrilin  Diet: ADULT DIET; Regular  Code Status: Full Code    PT/OT Eval Status: not ordered    Dispo - pending surgery       Porfirio Zacarias MD    Thank you LULU Ortiz CNP for the opportunity to be involved in this patient's care. If you have any questions or concerns please feel free to contact me at [phone].

## 2021-06-26 NOTE — PROGRESS NOTES
Cardiology Progress Note     Admit Date: 2021     Reason for follow up: STEMI    Interval History:   - Patient seen and examined. - Clinical notes reviewed. - Telemetry reviewed. NSR.  - No new complaints today. - No major events overnight. Chest pain improved. - Denies having chest pain, shortness of breath, dyspnea on exertion, Orthopnea, PND at the time of this visit. Physical Examination:  Vitals:    21 0900   BP: (!) 128/91   Pulse: 87   Resp: 14   Temp:    SpO2:         Intake/Output Summary (Last 24 hours) at 2021 0949  Last data filed at 2021 0801  Gross per 24 hour   Intake 261.8 ml   Output --   Net 261.8 ml     In: 261.8 [I.V.:261.8]  Out: -    Wt Readings from Last 3 Encounters:   21 220 lb 10.9 oz (100.1 kg)   17 203 lb 11.3 oz (92.4 kg)   10/06/17 203 lb 9.6 oz (92.4 kg)     Temp  Av.8 °F (36.6 °C)  Min: 97.8 °F (36.6 °C)  Max: 97.8 °F (36.6 °C)  Pulse  Av.2  Min: 73  Max: 95  BP  Min: 119/72  Max: 138/73  SpO2  Av.6 %  Min: 93 %  Max: 96 %    · Telemetry: Sinus rhythm   · Constitutional: Alert. Oriented to person, place, and time. No distress. · Head: Normocephalic and atraumatic. · Neck: Neck supple. No lymphadenopathy. No rigidity. No JVD present. · Cardiovascular: Normal rate, regular rhythm. Normal S1&S2. · Pulmonary/Chest: Bilateral respiratory sounds present. No respiratory accessory muscle use. No wheezes, No rhonchi. · Abdominal: Soft. Normal bowel sounds present. No distension, No tenderness. No splenomegaly. No hernia. · Musculoskeletal: No tenderness. No edema    · Neurological: Alert and oriented. Cranial nerve II-XII grossly intact. · Skin: Skin is warm and dry. No rash, lesions, ulcerations noted. Groin soft. Right wrist with small hematoma. · Psychiatric: No anxiety nor agitation. Labs, diagnostic and imaging results reviewed. Reviewed.    Recent Labs     21  0151 21  0608    133*   K myocardial infarction) ([facility] [I21.3] 06/26/2021    Essential hypertension [I10] 06/26/2021    Alcohol abuse [F10.10] 06/26/2021    ST elevation myocardial infarction (STEMI) ([facility] [I21.3]     Tobacco use [Z72.0] 08/16/2013     Mr. Trino Arnold is a pleasant 41year old male with a medical history significant for hypertension, history of tobacco abuse and history of EtOH abuse who presents from home with STEMI. Assessment and Plan:   1. Ischemic cardiomyopathy/STEMI. Mr. Trino Arnold is a pleasant 41year old male with a medical history significant for hypertension, and tobacco abuse who presents from home with STEMI. Patient underwent LHC which showed severe triple vessel disease involving LM. He underwent PTA to his LCx with placement of balloon pump. CT surgery was consulted. Plan for CABG early next week. Pump function appropriate. CTS ok with continuing balloon pump prior to surgery. - Continue Integrilin per protocol.  - Continue aspirin.  - Follow up echocardiogram.  - We will continue to follow along with you. 2. Hypertension. Stable. - Start GDMT post CABG. Thank you for allowing me to participate in the care of this patient. If you have any questions, please do not hesitate to contact me.     Santo Martin MD  Cardiac Electrophysiology  [address] [facility]  [phone] [facility]

## 2021-06-27 ENCOUNTER — ANESTHESIA (OUTPATIENT)
Dept: OPERATING ROOM | Age: 46
DRG: 232 | End: 2021-06-27

## 2021-06-27 ENCOUNTER — ANESTHESIA EVENT (OUTPATIENT)
Dept: OPERATING ROOM | Age: 46
DRG: 232 | End: 2021-06-27

## 2021-06-27 LAB
A/G RATIO: 1.5 (ref 1.1–2.2)
ABO/RH: NORMAL
ALBUMIN SERPL-MCNC: 4.2 G/DL (ref 3.4–5)
ALP BLD-CCNC: 77 U/L (ref 40–129)
ALT SERPL-CCNC: 69 U/L (ref 10–40)
ANION GAP SERPL CALCULATED.3IONS-SCNC: 9 MMOL/L (ref 3–16)
ANTIBODY SCREEN: NORMAL
AST SERPL-CCNC: 127 U/L (ref 15–37)
BASOPHILS ABSOLUTE: 0.1 K/UL (ref 0–0.2)
BASOPHILS RELATIVE PERCENT: 0.9 %
BILIRUB SERPL-MCNC: 0.7 MG/DL (ref 0–1)
BUN BLDV-MCNC: 12 MG/DL (ref 7–20)
CALCIUM SERPL-MCNC: 9.2 MG/DL (ref 8.3–10.6)
CHLORIDE BLD-SCNC: 103 MMOL/L (ref 99–110)
CO2: 24 MMOL/L (ref 21–32)
CREAT SERPL-MCNC: 0.8 MG/DL (ref 0.9–1.3)
EOSINOPHILS ABSOLUTE: 0.3 K/UL (ref 0–0.6)
EOSINOPHILS RELATIVE PERCENT: 3 %
GFR AFRICAN AMERICAN: >60
GFR NON-AFRICAN AMERICAN: >60
GLOBULIN: 2.8 G/DL
GLUCOSE BLD-MCNC: 124 MG/DL (ref 70–99)
HCT VFR BLD CALC: 42.9 % (ref 40.5–52.5)
HEMOGLOBIN: 15.2 G/DL (ref 13.5–17.5)
INR BLD: 1.05 (ref 0.86–1.14)
LYMPHOCYTES ABSOLUTE: 1.6 K/UL (ref 1–5.1)
LYMPHOCYTES RELATIVE PERCENT: 17.3 %
MAGNESIUM: 2.2 MG/DL (ref 1.8–2.4)
MCH RBC QN AUTO: 33 PG (ref 26–34)
MCHC RBC AUTO-ENTMCNC: 35.5 G/DL (ref 31–36)
MCV RBC AUTO: 93.1 FL (ref 80–100)
MONOCYTES ABSOLUTE: 0.8 K/UL (ref 0–1.3)
MONOCYTES RELATIVE PERCENT: 8.1 %
NEUTROPHILS ABSOLUTE: 6.7 K/UL (ref 1.7–7.7)
NEUTROPHILS RELATIVE PERCENT: 70.7 %
PDW BLD-RTO: 12.8 % (ref 12.4–15.4)
PHOSPHORUS: 3.1 MG/DL (ref 2.5–4.9)
PLATELET # BLD: 166 K/UL (ref 135–450)
PMV BLD AUTO: 8.5 FL (ref 5–10.5)
POTASSIUM SERPL-SCNC: 4.3 MMOL/L (ref 3.5–5.1)
PROTHROMBIN TIME: 12.2 SEC (ref 10–13.2)
RBC # BLD: 4.61 M/UL (ref 4.2–5.9)
SODIUM BLD-SCNC: 136 MMOL/L (ref 136–145)
TOTAL PROTEIN: 7 G/DL (ref 6.4–8.2)
WBC # BLD: 9.5 K/UL (ref 4–11)

## 2021-06-27 PROCEDURE — 85025 COMPLETE CBC W/AUTO DIFF WBC: CPT

## 2021-06-27 PROCEDURE — 86850 RBC ANTIBODY SCREEN: CPT

## 2021-06-27 PROCEDURE — 2100000000 HC CCU R&B

## 2021-06-27 PROCEDURE — 36415 COLL VENOUS BLD VENIPUNCTURE: CPT

## 2021-06-27 PROCEDURE — 80053 COMPREHEN METABOLIC PANEL: CPT

## 2021-06-27 PROCEDURE — 99233 SBSQ HOSP IP/OBS HIGH 50: CPT | Performed by: INTERNAL MEDICINE

## 2021-06-27 PROCEDURE — 2580000003 HC RX 258: Performed by: THORACIC SURGERY (CARDIOTHORACIC VASCULAR SURGERY)

## 2021-06-27 PROCEDURE — 6370000000 HC RX 637 (ALT 250 FOR IP): Performed by: INTERNAL MEDICINE

## 2021-06-27 PROCEDURE — 86900 BLOOD TYPING SEROLOGIC ABO: CPT

## 2021-06-27 PROCEDURE — 86901 BLOOD TYPING SEROLOGIC RH(D): CPT

## 2021-06-27 PROCEDURE — 6360000002 HC RX W HCPCS: Performed by: THORACIC SURGERY (CARDIOTHORACIC VASCULAR SURGERY)

## 2021-06-27 PROCEDURE — 85610 PROTHROMBIN TIME: CPT

## 2021-06-27 PROCEDURE — 6370000000 HC RX 637 (ALT 250 FOR IP): Performed by: THORACIC SURGERY (CARDIOTHORACIC VASCULAR SURGERY)

## 2021-06-27 PROCEDURE — 84100 ASSAY OF PHOSPHORUS: CPT

## 2021-06-27 PROCEDURE — 99024 POSTOP FOLLOW-UP VISIT: CPT | Performed by: THORACIC SURGERY (CARDIOTHORACIC VASCULAR SURGERY)

## 2021-06-27 PROCEDURE — 83735 ASSAY OF MAGNESIUM: CPT

## 2021-06-27 PROCEDURE — 6360000002 HC RX W HCPCS: Performed by: INTERNAL MEDICINE

## 2021-06-27 PROCEDURE — 86923 COMPATIBILITY TEST ELECTRIC: CPT

## 2021-06-27 PROCEDURE — 2500000003 HC RX 250 WO HCPCS: Performed by: INTERNAL MEDICINE

## 2021-06-27 PROCEDURE — 99232 SBSQ HOSP IP/OBS MODERATE 35: CPT | Performed by: INTERNAL MEDICINE

## 2021-06-27 RX ORDER — MIDAZOLAM HYDROCHLORIDE 1 MG/ML
5 INJECTION INTRAMUSCULAR; INTRAVENOUS ONCE
Status: COMPLETED | OUTPATIENT
Start: 2021-06-28 | End: 2021-06-28

## 2021-06-27 RX ORDER — SODIUM CHLORIDE 9 MG/ML
25 INJECTION, SOLUTION INTRAVENOUS PRN
Status: DISCONTINUED | OUTPATIENT
Start: 2021-06-27 | End: 2021-06-28

## 2021-06-27 RX ORDER — ASPIRIN 81 MG/1
81 TABLET ORAL DAILY
Status: DISCONTINUED | OUTPATIENT
Start: 2021-06-28 | End: 2021-06-28

## 2021-06-27 RX ORDER — EPTIFIBATIDE 0.75 MG/ML
2 INJECTION, SOLUTION INTRAVENOUS CONTINUOUS
Status: DISCONTINUED | OUTPATIENT
Start: 2021-06-27 | End: 2021-06-28

## 2021-06-27 RX ORDER — SODIUM CHLORIDE 0.9 % (FLUSH) 0.9 %
5-40 SYRINGE (ML) INJECTION PRN
Status: DISCONTINUED | OUTPATIENT
Start: 2021-06-27 | End: 2021-06-28

## 2021-06-27 RX ORDER — SODIUM CHLORIDE 0.9 % (FLUSH) 0.9 %
5-40 SYRINGE (ML) INJECTION EVERY 12 HOURS SCHEDULED
Status: DISCONTINUED | OUTPATIENT
Start: 2021-06-27 | End: 2021-06-28

## 2021-06-27 RX ORDER — ATORVASTATIN CALCIUM 80 MG/1
80 TABLET, FILM COATED ORAL NIGHTLY
Status: DISCONTINUED | OUTPATIENT
Start: 2021-06-27 | End: 2021-06-27 | Stop reason: SDUPTHER

## 2021-06-27 RX ADMIN — EPTIFIBATIDE 2 MCG/KG/MIN: 0.75 INJECTION INTRAVENOUS at 11:23

## 2021-06-27 RX ADMIN — ACETAMINOPHEN 650 MG: 325 TABLET ORAL at 17:02

## 2021-06-27 RX ADMIN — Medication 10 ML: at 21:09

## 2021-06-27 RX ADMIN — EPTIFIBATIDE 2 MCG/KG/MIN: 0.75 INJECTION INTRAVENOUS at 02:20

## 2021-06-27 RX ADMIN — ATORVASTATIN CALCIUM 80 MG: 80 TABLET, FILM COATED ORAL at 21:07

## 2021-06-27 RX ADMIN — METOPROLOL TARTRATE 12.5 MG: 25 TABLET, FILM COATED ORAL at 21:07

## 2021-06-27 RX ADMIN — ASPIRIN 81 MG: 81 TABLET, CHEWABLE ORAL at 08:28

## 2021-06-27 RX ADMIN — PANTOPRAZOLE SODIUM 40 MG: 40 TABLET, DELAYED RELEASE ORAL at 08:28

## 2021-06-27 RX ADMIN — NITROGLYCERIN 60 MCG/MIN: 20 INJECTION INTRAVENOUS at 18:29

## 2021-06-27 RX ADMIN — ACETAMINOPHEN 650 MG: 325 TABLET ORAL at 21:07

## 2021-06-27 RX ADMIN — EPTIFIBATIDE 2 MCG/KG/MIN: 0.75 INJECTION INTRAVENOUS at 09:21

## 2021-06-27 RX ADMIN — EPTIFIBATIDE 2 MCG/KG/MIN: 0.75 INJECTION INTRAVENOUS at 16:44

## 2021-06-27 ASSESSMENT — PAIN SCALES - GENERAL
PAINLEVEL_OUTOF10: 0
PAINLEVEL_OUTOF10: 3
PAINLEVEL_OUTOF10: 0
PAINLEVEL_OUTOF10: 3
PAINLEVEL_OUTOF10: 0

## 2021-06-27 ASSESSMENT — LIFESTYLE VARIABLES: SMOKING_STATUS: 1

## 2021-06-27 ASSESSMENT — ENCOUNTER SYMPTOMS: SHORTNESS OF BREATH: 1

## 2021-06-27 NOTE — PROGRESS NOTES
Cardiology Progress Note     Admit Date: 2021     Reason for follow up: STEMI    Interval History:   - Patient seen and examined. - Clinical notes reviewed. - Telemetry reviewed. NSR.  - No new complaints today. - No major events overnight. Chest pain resolved. - Having some back pain. - Blood pressures elevated but asymptomatic. Physical Examination:  Vitals:    21 0800   BP: (!) 175/108   Pulse: 89   Resp: 20   Temp: 97.6 °F (36.4 °C)   SpO2: 95%        Intake/Output Summary (Last 24 hours) at 2021 0827  Last data filed at 2021 0706  Gross per 24 hour   Intake 519.36 ml   Output 700 ml   Net -180.64 ml     In: 441.2 [I.V.:441.2]  Out: 700    Wt Readings from Last 3 Encounters:   21 216 lb 7.9 oz (98.2 kg)   17 203 lb 11.3 oz (92.4 kg)   10/06/17 203 lb 9.6 oz (92.4 kg)     Temp  Av.9 °F (36.6 °C)  Min: 97.4 °F (36.3 °C)  Max: 98.2 °F (36.8 °C)  Pulse  Av.4  Min: 60  Max: 89  BP  Min: 118/78  Max: 175/108  SpO2  Av.6 %  Min: 86 %  Max: 99 %    · Telemetry: Sinus rhythm   · Constitutional: Alert. Oriented to person, place, and time. No distress. · Head: Normocephalic and atraumatic. · Neck: Neck supple. No lymphadenopathy. No rigidity. No JVD present. · Cardiovascular: Normal rate, regular rhythm. Normal S1&S2. · Pulmonary/Chest: Bilateral respiratory sounds present. No respiratory accessory muscle use. No wheezes, No rhonchi. · Abdominal: Soft. Normal bowel sounds present. No distension, No tenderness. · Musculoskeletal: No tenderness. No edema    · Neurological: Alert and oriented. Cranial nerve II-XII grossly intact. · Skin: Skin is warm and dry. No rash, lesions, ulcerations noted. Groin soft. Right wrist with small hematoma (improved). · Psychiatric: No anxiety nor agitation. Labs, diagnostic and imaging results reviewed. Reviewed.    Recent Labs     21  0151 21  0608 21  0451    133* 136   K 3.7 4.0 4.3    103 103   CO2 22 22 24   PHOS  --  3.0 3.1   BUN 13 12 12   CREATININE 1.1 1.0 0.8*     Recent Labs     06/26/21  0151 06/26/21  0608 06/27/21  0451   WBC 13.0* 11.2* 9.5   HGB 15.6 14.6 15.2   HCT 46.2 40.4* 42.9   MCV 95.3 91.5 93.1    177 166     Lab Results   Component Value Date    TROPONINI 0.06 06/26/2021     Estimated Creatinine Clearance: 136 mL/min (A) (based on SCr of 0.8 mg/dL (L)).    No results found for: BNP  Lab Results   Component Value Date    PROTIME 12.2 06/27/2021    PROTIME 11.9 06/26/2021    INR 1.05 06/27/2021    INR 1.03 06/26/2021     No results found for: CHOL, HDL, TRIG    Scheduled Meds:   sodium chloride flush  5-40 mL Intravenous 2 times per day    aspirin  81 mg Oral Daily    atorvastatin  80 mg Oral Nightly    pantoprazole  40 mg Oral QAM AC    thiamine  100 mg Oral Daily    multivitamin  1 tablet Oral Daily    nicotine  1 patch Transdermal Daily     Continuous Infusions:   phenylephrine (TETO-SYNEPHRINE) 50mg/250mL infusion Stopped (06/26/21 4423)    sodium chloride      eptifibatide 2 mcg/kg/min (06/27/21 0706)    nitroGLYCERIN 30 mcg/min (06/27/21 0822)     PRN Meds:sodium chloride flush, sodium chloride, acetaminophen, magnesium sulfate, ondansetron, acetaminophen **OR** acetaminophen, LORazepam **OR** LORazepam **OR** LORazepam **OR** LORazepam **OR** LORazepam **OR** LORazepam **OR** LORazepam **OR** LORazepam     Patient Active Problem List    Diagnosis Date Noted    STEMI (ST elevation myocardial infarction) ([facility] 06/26/2021    Essential hypertension 06/26/2021    Alcohol abuse 06/26/2021    Class 1 obesity in adult 06/26/2021    Observed sleep apnea 06/26/2021    Ischemic cardiomyopathy 06/26/2021    Elevated LFTs 06/26/2021    ST elevation myocardial infarction (STEMI) ([facility]     Chondromalacia patellae of left knee 11/02/2017    Left knee pain 11/02/2017    Shortness of breath 10/07/2017    Gastroesophageal reflux disease without esophagitis 12/18/2015    Snoring 12/18/2015    Mechanical back pain 06/06/2014    Thoracic back pain 08/16/2013    Tobacco use 08/16/2013      Active Hospital Problems    Diagnosis Date Noted    STEMI (ST elevation myocardial infarction) ([facility] [I21.3] 06/26/2021    Essential hypertension [I10] 06/26/2021    Alcohol abuse [F10.10] 06/26/2021    Class 1 obesity in adult [E66.9] 06/26/2021    Observed sleep apnea [G47.30] 06/26/2021    Ischemic cardiomyopathy [I25.5] 06/26/2021    Elevated LFTs [R79.89] 06/26/2021    Tobacco use [Z72.0] 08/16/2013     Mr. Shawna Arango is a pleasant 41year old male with a medical history significant for hypertension, history of tobacco abuse and history of EtOH abuse who presents from home with STEMI. Assessment and Plan:   1. Ischemic cardiomyopathy/STEMI.  06/26/2021  Mr. Shawna Arango is a pleasant 41year old male with a medical history significant for hypertension, and tobacco abuse who presents from home with STEMI. Patient underwent LHC which showed severe triple vessel disease involving LM. He underwent PTA to his LCx with placement of balloon pump. CT surgery was consulted. Plan for CABG early next week. Pump function appropriate. CTS ok with continuing balloon pump prior to surgery. - Continue Integrilin per protocol.  - Continue aspirin.  - Follow up echocardiogram.  - We will continue to follow along with you.    06/27/2021  Patient doing well. Ready for CABG. Balloon pump continues to be in place and working appropriately. Hopefully going to surgery tomorrow. Echocardiogram shows mildly depressed LVEF.  - Continue Integrilin per protocol.  - Continue aspirin.  - Follow up echocardiogram.  - We will continue to follow along with you. 2. Hypertension. Stable. - Start GDMT post CABG. Thank you for allowing me to participate in the care of this patient. If you have any questions, please do not hesitate to contact me.     Lamar Hairston MD  Cardiac

## 2021-06-27 NOTE — PROGRESS NOTES
Pre-op orders placed by Dr. Lior Eaton. Stop Integrilin at 0100 on 6/28/21, order placed for stop time.

## 2021-06-27 NOTE — PROGRESS NOTES
Patient's blood pressure elevated. Titrating patient's nitro drip to maintain a systolic less than 405. Patient tolerating balloon pump appropriately.

## 2021-06-27 NOTE — PROGRESS NOTES
Patient's cuff blood pressure continues to increase. Blood pressure on balloon pump ranging from 485-214I systolic. Patient denies pain, SOB. Distal lower extremity pulse palpable. Insertion site intact with no bleeding or hematoma.

## 2021-06-27 NOTE — PROGRESS NOTES
Hospitalist Progress Note      PCP: LULU Sharma - CNP    Date of Admission: 6/26/2021    Chief Complaint: chest pain    Hospital Course:   41 y.o. male who presented to [facility] with chest pain. Patient developed severe chest pain last night during a car race. Pain was sternal and radiated to his shoulders, neck and jaw. He had never had pain like this before. Patient is an active smoker and drinks about 6 beers per day. He previously was told he has high blood pressure but has not been on medication for this. He denies fevers, chills, SOB, nausea or diarrhea. Subjective: no new complaints. Likely to OR tomorrow. Medications:  Reviewed    Infusion Medications    phenylephrine (TETO-SYNEPHRINE) 50mg/250mL infusion Stopped (06/26/21 0184)    sodium chloride      eptifibatide 2 mcg/kg/min (06/27/21 9060)    nitroGLYCERIN 40 mcg/min (06/27/21 9234)     Scheduled Medications    sodium chloride flush  5-40 mL Intravenous 2 times per day    aspirin  81 mg Oral Daily    atorvastatin  80 mg Oral Nightly    pantoprazole  40 mg Oral QAM AC    thiamine  100 mg Oral Daily    multivitamin  1 tablet Oral Daily    nicotine  1 patch Transdermal Daily     PRN Meds: sodium chloride flush, sodium chloride, acetaminophen, magnesium sulfate, ondansetron, acetaminophen **OR** acetaminophen, LORazepam **OR** LORazepam **OR** LORazepam **OR** LORazepam **OR** LORazepam **OR** LORazepam **OR** LORazepam **OR** LORazepam      Intake/Output Summary (Last 24 hours) at 6/27/2021 0846  Last data filed at 6/27/2021 9768  Gross per 24 hour   Intake 549.81 ml   Output 700 ml   Net -150.19 ml       Physical Exam Performed:    BP (!) 175/108   Pulse 89   Temp 97.6 °F (36.4 °C) (Oral)   Resp 20   Ht 5' 9\" (1.753 m)   Wt 216 lb 7.9 oz (98.2 kg)   SpO2 95%   BMI 31.97 kg/m²     General appearance:  No apparent distress, appears stated age and cooperative.   HEENT:  Normal cephalic, atraumatic without obvious deformity. Pupils equal, round, and reactive to light. Extra ocular muscles intact. Conjunctivae/corneas clear. Neck: Supple, with full range of motion. No jugular venous distention. Trachea midline. Respiratory:  Normal respiratory effort. Clear to auscultation, bilaterally without Rales/Wheezes/Rhonchi. Cardiovascular:  Regular rate and rhythm with normal S1/S2 without murmurs, rubs or gallops. Abdomen: Soft, non-tender, non-distended with normal bowel sounds. Musculoskeletal:  No clubbing, cyanosis or edema bilaterally. Full range of motion without deformity. Skin: Skin color, texture, turgor normal.  No rashes or lesions. Neurologic:  Neurovascularly intact without any focal sensory/motor deficits.  Cranial nerves: II-XII intact, grossly non-focal.  Psychiatric:  Alert and oriented, thought content appropriate, normal insight  Capillary Refill: Brisk,3 seconds, normal  Peripheral Pulses: +2 palpable, equal bilaterally     Labs:   Recent Labs     06/26/21  0151 06/26/21  0608 06/27/21  0451   WBC 13.0* 11.2* 9.5   HGB 15.6 14.6 15.2   HCT 46.2 40.4* 42.9    177 166     Recent Labs     06/26/21  0151 06/26/21  0608 06/27/21  0451    133* 136   K 3.7 4.0 4.3    103 103   CO2 22 22 24   BUN 13 12 12   CREATININE 1.1 1.0 0.8*   CALCIUM 9.4 9.1 9.2   PHOS  --  3.0 3.1     Recent Labs     06/26/21  0151 06/26/21  0608 06/27/21  0451   AST 32 129* 127*   ALT 57* 65* 69*   BILITOT 0.4 0.7 0.7   ALKPHOS 89 80 77     Recent Labs     06/26/21  0608 06/27/21  0451   INR 1.03 1.05     Recent Labs     06/26/21  0151   TROPONINI 0.06*       Urinalysis:    No results found for: Easter Fermo, BACTERIA, RBCUA, BLOODU, SPECGRAV, GLUCOSEU    Radiology:  XR CHEST PORTABLE   Final Result   Negative portable chest.                 Assessment/Plan:    Active Hospital Problems    Diagnosis     STEMI (ST elevation myocardial infarction) ([facility] [I21.3]     Essential hypertension [I10]     Alcohol abuse [F10.10]     Class 1 obesity in adult [E66.9]     Observed sleep apnea [G47.30]     Ischemic cardiomyopathy [I25.5]     Elevated LFTs [R79.89]     Tobacco use [Z72.0]      STEMI  - cardiology consulted  - s/p [facility] with severe multivessel disease  - echo with EF 45-50%  - IABP in place  - CT surgery consulted  - plan for CABG likely on Monday  - continue integrelin, ASA, lipitor     HTN  - poorly controlled  - continue nitro gtt  - will start oral regimen following surgery    HLD  - started on statin     Alcohol dependence  - continue to monitor for withdrawal. [facility] protocol  - vitamins ordered  - counseling given     Tobacco dependence  - counseling given. Nicotine replacement ordered     Obesity  With Body mass index is 00.13 kg/m². Complicating assessment and treatment. Placing patient at risk for multiple co-morbidities as well as early death and contributing to the patient's presentation. Counseled on weight loss. DVT Prophylaxis: Integrilin gtt  Diet: ADULT DIET;  Regular  Code Status: Full Code    PT/OT Eval Status: not ordered    Dispo - pending surgery    Chuy Levine MD

## 2021-06-27 NOTE — PROGRESS NOTES
Notified Clinical that MD Patel wants pt placed on Surgery schedule for CABG x 4 with MD Ray Luther at [facility] 6/28/21

## 2021-06-27 NOTE — PROGRESS NOTES
INPATIENT PULMONARY CRITICAL CARE PROGRESS NOTE      Reason for visit    STEMI/found to have muti-vessel disease ;CT surgery to consider CABG       SUBJECTIVE: Patient when seen this morning continues to be asymptomatic, patient does not have any significant chest pain, patient was having some cough without any expectoration, patient does not have any shortness of breath or wheezing, patient does not have any fever or chills, patient's blood pressure was on the higher side and patient has been started on nitroglycerin drip and preop beta-blocker being planned as per nursing, patient was afebrile, balloon pump in place, patient documented urine output is on the lower side with cumulative fluid balance of +372 mL, patient's blood sugars were acceptable, patient was alert and oriented, no other pertinent review of system of concern        Physical Exam:  Blood pressure (!) 153/108, pulse 77, temperature 97.6 °F (36.4 °C), temperature source Oral, resp. rate 12, height 5' 9\" (1.753 m), weight 216 lb 7.9 oz (98.2 kg), SpO2 93 %.'     Constitutional:  No acute distress. HENT:  Oropharynx is clear and moist. No thyromegaly. Eyes:  Conjunctivae are normal. Pupils equal, round, and reactive to light. No scleral icterus. Neck: . No tracheal deviation present. No obvious thyroid mass. Short enlarged neck  Cardiovascular: Normal rate, regular rhythm, normal heart sounds. No right ventricular heave. No lower extremity edema. Pulmonary/Chest: No wheezes. No rales. Chest wall is not dull to percussion. No accessory muscle usage or stridor. Decreased breath sound intensity  Abdominal: Soft. Bowel sounds present. No distension or hernia. No tenderness. Obese  Musculoskeletal: No cyanosis. No clubbing. No obvious joint deformity. Lymphadenopathy: No cervical or supraclavicular adenopathy. Skin: Skin is warm and dry. No rash or nodules on the exposed extremities. Psychiatric: Normal mood and affect.  Behavior is normal.  No anxiety. Neurologic: Alert, awake and oriented. PERRL. Speech fluent               Results:  CBC:   Recent Labs     06/26/21 0151 06/26/21 0608 06/27/21  0451   WBC 13.0* 11.2* 9.5   HGB 15.6 14.6 15.2   HCT 46.2 40.4* 42.9   MCV 95.3 91.5 93.1    177 166     BMP:   Recent Labs     06/26/21 0151 06/26/21 0608 06/27/21  0451    133* 136   K 3.7 4.0 4.3    103 103   CO2 22 22 24   PHOS  --  3.0 3.1   BUN 13 12 12   CREATININE 1.1 1.0 0.8*     LIVER PROFILE:   Recent Labs     06/26/21 0151 06/26/21 0608 06/27/21 0451   AST 32 129* 127*   ALT 57* 65* 69*   BILITOT 0.4 0.7 0.7   ALKPHOS 89 80 77     PT/INR:   Recent Labs     06/26/21 0608 06/27/21 0451   PROTIME 11.9 12.2   INR 1.03 1.05       Imaging:  I have reviewed radiology images personally. XR CHEST PORTABLE   Final Result   Negative portable chest.           Echo Complete    Result Date: 6/26/2021  Transthoracic Echocardiography Report (TTE)  Demographics   Patient Name       Neita Areas   Date of Study      06/26/2021        Gender              Male   Patient Number     [id number]        Date of Birth       1975   Visit Number       [id number]         Age                 41 year(s)   Accession Number   [id number]        Room Number         2548   Corporate ID       [id number]          Sonographer         RT Elenita   Ordering Physician Prasanth Francis MD  Interpreting        [address] [place] [address].                                       Physician           Quique Lanier MD,                                                           [facility]  Procedure Type of Study   TTE procedure:ECHOCARDIOGRAM COMPLETE 2D W DOPPLER W COLOR. Procedure Date Date: 06/26/2021 Start: 09:40 AM Study Location: [address] [place] [address] - Echo Lab Technical Quality: Adequate visualization Indications:Myocardial infarction. Patient Status: Routine Contrast Medium: Definity.  Height: 71 inches Weight: 197 pounds BSA: 2.1 m2 BMI: 27.48 kg/m2 BP: 119/72 mmHg  Conclusions   Summary  The left ventricular systolic function is mildly reduced with an ejection  fraction of 45 -50 %. There is hypokinesis of the inferolateral and anterolateral walls. Mild concentric left ventricular hypertrophy. Left ventricular diastolic filling pressure is normal.  Mild mitral regurgitation. Signature   ------------------------------------------------------------------  Electronically signed by Jillian Nye MD, [facility] (Interpreting  physician) on 06/26/2021 at 10:58 AM  ------------------------------------------------------------------   Findings   Left Ventricle  The left ventricular systolic function is mildly reduced with an ejection  fraction of 45 -50 %. There is hypokinesis of the inferolateral and anterolateral walls. Normal left ventricular size with mild concentric left ventricular  hypertrophy. Left ventricular diastolic filling pressure is normal.   Mitral Valve  Mitral valve is structurally normal.  Mild mitral regurgitation. Left Atrium  The left atrium is normal in size. Aortic Valve  Aortic valve appears sclerotic but opens adequately. No evidence of aortic valve regurgitation. Aorta  The aortic root is normal in size. Right Ventricle  The right ventricle is normal in size and function. TAPSE is measured at 18 mm. S'prime velocity is measured at 10 cm/s. Tricuspid Valve  Tricuspid valve is structurally normal.  No tricuspid regurgitation to estimate systolic pulmonary artery pressure  (SPAP). Right Atrium  The right atrium is normal in size at 10 cm2. Pulmonic Valve  The pulmonic valve is not well visualized. No evidence of pulmonic valve regurgitation. Pericardial Effusion  No pericardial effusion noted. Pleural Effusion  No pleural effusion. Miscellaneous  IVC size is normal (<2.1cm) and collapses > 50% with respiration consistent  with normal RA pressure (3mmHg).   M-Mode/2D Measurements (cm)   LV Diastolic Dimension: 8.53 cm LV Systolic Dimension: 1.86 cm  LV Septum Diastolic: 4.68 cm  LV PW Diastolic: 1.19 cm        AO Root Dimension: 3.1 cm                                  AV Cusp Separation: 1.7 cm                                  LA Dimension: 2.8 cm                                  LA Area: 12 cm2                                  LA volume/Index: 28 ml /13 ml/m2  Doppler Measurements   AV Peak Velocity: 119 cm/s     MV Peak E-Wave: 69.6 cm/s  AV Peak Gradient: 5.66 mmHg    MV Peak A-Wave: 54.8 cm/s                                 MV E/A Ratio: 1.27   E' Septal Velocity: 7.7 cm/s  E' Lateral Velocity: 15.1 cm/s  E/E' ratio: 6.8  PV Peak Velocity: 71.9 cm/s  PV Peak Gradient: 2.07 mmHg   Aortic Valve   Peak Velocity: 119 cm/s  Peak Gradient: 5.66 mmHg   Cusp Separation: 1.7 cm  Aorta   Aortic Root: 3.1 cm      XR CHEST PORTABLE    Result Date: 6/26/2021  EXAMINATION: SINGLE-VIEW CHEST RADIOGRAPH 06/26/2021 AT 1:51 a.m. COMPARISON: None. HISTORY: Chest pain. FINDINGS: The lungs are clear. The heart size is within normal limits. There is no large pleural effusion or definite evidence for pneumothorax. Negative portable chest.         Results for Daren River (MRN [id number]) as of 6/27/2021 10:16   Ref.  Range 6/26/2021 06:08 6/26/2021 06:45 6/26/2021 09:40 6/27/2021 04:51   Sodium Latest Ref Range: 136 - 145 mmol/L 133 (L)   136   Potassium Latest Ref Range: 3.5 - 5.1 mmol/L 4.0   4.3   Chloride Latest Ref Range: 99 - 110 mmol/L 103   103   CO2 Latest Ref Range: 21 - 32 mmol/L 22   24   BUN Latest Ref Range: 7 - 20 mg/dL 12   12   Creatinine Latest Ref Range: 0.9 - 1.3 mg/dL 1.0   0.8 (L)   Anion Gap Latest Ref Range: 3 - 16  8   9   GFR Non- Latest Ref Range: >60  >60   >60   GFR  Latest Ref Range: >60  >60   >60   Magnesium Latest Ref Range: 1.80 - 2.40 mg/dL 2.20   2.20   Glucose Latest Ref Range: 70 - 99 mg/dL 130 (H)   124 (H)   Calcium Latest Ref Range: 8.3 - 10.6 mg/dL 9.1   9.2   Phosphorus Latest Ref Range: 2.5 - 4.9 mg/dL 3.0   3.1   Total Protein Latest Ref Range: 6.4 - 8.2 g/dL 6.9   7.0   Albumin Latest Ref Range: 3.4 - 5.0 g/dL 4.1   4.2   Globulin Latest Units: g/dL 2.8   2.8   Albumin/Globulin Ratio Latest Ref Range: 1.1 - 2.2  1.5   1.5   Alk Phos Latest Ref Range: 40 - 129 U/L 80   77   ALT Latest Ref Range: 10 - 40 U/L 65 (H)   69 (H)   AST Latest Ref Range: 15 - 37 U/L 129 (H)   127 (H)   Bilirubin Latest Ref Range: 0.0 - 1.0 mg/dL 0.7   0.7   WBC Latest Ref Range: 4.0 - 11.0 K/uL 11.2 (H)   9.5   RBC Latest Ref Range: 4.20 - 5.90 M/uL 4.41   4.61   Hemoglobin Quant Latest Ref Range: 13.5 - 17.5 g/dL 14.6   15.2   Hematocrit Latest Ref Range: 40.5 - 52.5 % 40.4 (L)   42.9   MCV Latest Ref Range: 80.0 - 100.0 fL 91.5   93.1   MCH Latest Ref Range: 26.0 - 34.0 pg 33.0   33.0   MCHC Latest Ref Range: 31.0 - 36.0 g/dL 36.0   35.5   MPV Latest Ref Range: 5.0 - 10.5 fL 8.3   8.5   RDW Latest Ref Range: 12.4 - 15.4 % 12.9   12.8   Platelet Count Latest Ref Range: 135 - 450 K/uL 177   166   Neutrophils % Latest Units: % 83.0   70.7   Lymphocyte % Latest Units: % 10.5   17.3   Monocytes % Latest Units: % 4.8   8.1   Eosinophils % Latest Units: % 0.9   3.0       Assessment:  Active Problems:    Tobacco use    STEMI (ST elevation myocardial infarction) (HCC)    Essential hypertension    Alcohol abuse    Class 1 obesity in adult    Observed sleep apnea    Ischemic cardiomyopathy    Elevated LFTs  Resolved Problems:    * No resolved hospital problems.  *          Plan:   · Oxygen saturation, if required, to keep saturation between 90 to 94% only  · Patient was on room air oxygen when seen  · Pulmonary toilet  · Patient is off any pressors  · Intra aortic balloon pump in place  · Monitor for cardiac rhythm and hemodynamics closely  · Patient has been started on nitroglycerin drip  · Patient to be getting beta-blocker preop  · Does not need any antibiotics/steroids or bronchodilators on a regular basis from pulmonary standpoint of view at this time  · Cardiac medications including antiplatelet therapy as per cardiology  · Monitor liver function test closely  · Smoking cessation advised  · Nicotine replacement therapy if the patient wants  · Patient was told about the pathophysiology and sequelae of SOLITARIO  · Patient should get a polysomnography as an outpatient  · Patient needs to quit smoking and also patient needs to refrain from alcohol drinking  · Patient needs to monitor for any alcohol withdrawal symptoms  · Patient going for CABG tomorrow  · Patient does not have any gross contraindication from pulmonary standpoint of view for the CABG  · PUD prophylaxis    Case discussed with patient and ICU team    No other recommendations from pulmonary standpoint of view- will sign off            Electronically signed by:  Chel Taylor MD    6/27/2021    10:15 AM.

## 2021-06-27 NOTE — PROGRESS NOTES
Cardiac, Vascular and Thoracic Surgeons  Clinic Note     6/27/2021 10:07 AM  Surgeon:  Francella Boxer follow-up  Chief complaint : Preop  Subjective: No major complaint or event overnight doing well angioplasty on Integrilin    Vital Signs: BP (!) 154/91   Pulse 75   Temp 97.6 °F (36.4 °C) (Oral)   Resp 9   Ht 5' 9\" (1.753 m)   Wt 216 lb 7.9 oz (98.2 kg)   SpO2 95%   BMI 31.97 kg/m²      I/O:      Intake/Output Summary (Last 24 hours) at 6/27/2021 1007  Last data filed at 6/27/2021 5158  Gross per 24 hour   Intake 811 ml   Output 700 ml   Net 111 ml       Exam:   Cardiovascular: S1 plus S2 +0 no additional sound  Pulmonary: Bilaterally clear no additional sound    Labs:   CBC:   Recent Labs     06/26/21  0151 06/26/21  0608 06/27/21  0451   WBC 13.0* 11.2* 9.5   HGB 15.6 14.6 15.2   HCT 46.2 40.4* 42.9   MCV 95.3 91.5 93.1    177 166     BMP:   Recent Labs     06/26/21  0151 06/26/21  0608 06/27/21  0451    133* 136   K 3.7 4.0 4.3    103 103   CO2 22 22 24   PHOS  --  3.0 3.1   BUN 13 12 12   CREATININE 1.1 1.0 0.8*     PT/INR:   Recent Labs     06/26/21  0608 06/27/21  0451   PROTIME 11.9 12.2   INR 1.03 1.05     APTT: No results for input(s): APTT in the last 72 hours.     Scheduled Meds:    [START ON 6/28/2021] midazolam  5 mg Intravenous Once    sodium chloride flush  5-40 mL Intravenous 2 times per day    aspirin  81 mg Oral Daily    atorvastatin  80 mg Oral Nightly    pantoprazole  40 mg Oral QAM AC    thiamine  100 mg Oral Daily    multivitamin  1 tablet Oral Daily    nicotine  1 patch Transdermal Daily       Patient Active Problem List   Diagnosis    Thoracic back pain    Tobacco use    Mechanical back pain    Gastroesophageal reflux disease without esophagitis    Snoring    Shortness of breath    Chondromalacia patellae of left knee    Left knee pain    ST elevation myocardial infarction (STEMI) ([address]    STEMI ([facility] elevation myocardial infarction) [facility] [address]   

## 2021-06-27 NOTE — PROGRESS NOTES
RN educated patient and patient's wife on [facility] pre-op and post-op information. RN instructed patient on using incentive spirometer.

## 2021-06-27 NOTE — PROGRESS NOTES
Shift handoff from [facility]. Patient awake, alert and oriented. Integrilin and nitro gtt infusing. IABP site clean, dry, intact. Will continue to monitor.

## 2021-06-27 NOTE — ANESTHESIA PRE PROCEDURE
Department of Anesthesiology  Preprocedure Note       Name:  Dominique Ríos   Age:  41 y.o.  :  1975                                          MRN:  [id number]         Date:  2021      Surgeon: * No surgeons listed *    Procedure: * No procedures listed *    Medications prior to admission:   Prior to Admission medications    Medication Sig Start Date End Date Taking?  Authorizing Provider   naproxen-esomeprazole (VIMOVO) 500-20 MG TBEC Take 1 tablet by mouth 2 times daily 17  Bossman Shannon MD       Current medications:    Current Facility-Administered Medications   Medication Dose Route Frequency Provider Last Rate Last Renetta Crews ON 2021] midazolam (VERSED) injection 5 mg  5 mg Intravenous Once MD Lilia Narayan [START ON 2021] insulin regular (HUMULIN R;NOVOLIN R) 100 Units in sodium chloride 0.9 % 100 mL infusion  0.5 Units/hr Intravenous On Call to [id number] [place], [place] [address]MD Lilia [START ON 2021] norepinephrine (LEVOPHED) 16 mg in dextrose 5 % 250 mL infusion  2 mcg/min Intravenous Continuous Lottie Emmanuel MD        phenylephrine (TETO-SYNEPHRINE) 50 mg in dextrose 5 % 250 mL infusion   mcg/min Intravenous Continuous Art Franklin MD   Stopped at 21 4557    sodium chloride flush 0.9 % injection 5-40 mL  5-40 mL Intravenous 2 times per day Carolyne Richey MD   10 mL at 21 2157    sodium chloride flush 0.9 % injection 5-40 mL  5-40 mL Intravenous PRN Carolyne Richey MD        0.9 % sodium chloride infusion  25 mL Intravenous PRN Carolyne Richey MD        acetaminophen (TYLENOL) tablet 650 mg  650 mg Oral Q4H PRN Carolyne Richey MD        aspirin chewable tablet 81 mg  81 mg Oral Daily Carolyne Richey MD   81 mg at 21 1487    atorvastatin (LIPITOR) tablet 80 mg  80 mg Oral Nightly Carolyne Richey MD   80 mg at 21 2156    eptifibatide (INTEGRILIN) 0.75 mg/mL infusion  2 mcg/kg/min (Order-Specific) Intravenous Continuous Lizet Feldman Micheline Cogan, MD 14.4 mL/hr at 06/27/21 0921 2 mcg/kg/min at 06/27/21 0921    pantoprazole (PROTONIX) tablet 40 mg  40 mg Oral QAM AC Erwin Larsen MD   40 mg at 06/27/21 0828    magnesium sulfate 1000 mg in dextrose 5% 100 mL IVPB  1,000 mg Intravenous PRN Erwin Larsen MD        ondansetron [facility]) injection 4 mg  4 mg Intravenous Q6H PRN Erwin Larsen MD        acetaminophen (TYLENOL) tablet 650 mg  650 mg Oral Q4H PRN Erwin Larsen MD        Or    acetaminophen (TYLENOL) suppository 650 mg  650 mg Rectal Q4H PRN Erwin Larsen MD        thiamine tablet 100 mg  100 mg Oral Daily Erwin Larsen MD        therapeutic multivitamin-minerals 1 tablet  1 tablet Oral Daily Erwin Larsen MD        nicotine (NICODERM CQ) 14 MG/24HR 1 patch  1 patch Transdermal Daily Erwin Larsen MD        LORazepam (ATIVAN) tablet 1 mg  1 mg Oral Q1H PRN Erwin Larsen MD        Or    LORazepam (ATIVAN) injection 1 mg  1 mg Intravenous Q1H PRN Erwin Larsen MD        Or    LORazepam (ATIVAN) tablet 2 mg  2 mg Oral Q1H PRN Erwin Larsen MD        Or    LORazepam (ATIVAN) injection 2 mg  2 mg Intravenous Q1H PRN Erwin Larsen MD        Or    LORazepam (ATIVAN) tablet 3 mg  3 mg Oral Q1H PRN Erwin Larsen MD        Or    LORazepam (ATIVAN) injection 3 mg  3 mg Intravenous Q1H PRN Erwin Larsen MD        Or    LORazepam (ATIVAN) tablet 4 mg  4 mg Oral Q1H PRN Erwin Larsen MD        Or    LORazepam (ATIVAN) injection 4 mg  4 mg Intravenous Q1H PRN Erwin Larsen MD        nitroGLYCERIN 50 mg in dextrose 5% 250 mL infusion  5-200 mcg/min Intravenous Continuous Mireille Jhaveri MD 12 mL/hr at 06/27/21 0921 40 mcg/min at 06/27/21 2197       Allergies: Allergies   Allergen Reactions    Penicillins Other (See Comments)     Reports mother told him he was allergic to PCN.        Problem List:    Patient Active Problem List   Diagnosis Code    Thoracic back pain M54.6    Tobacco use Z72.0    Mechanical back pain M54.9    Gastroesophageal reflux disease without esophagitis K21.9    Snoring R06.83    Shortness of breath R06.02    Chondromalacia patellae of left knee M22.42    Left knee pain M25.562    ST elevation myocardial infarction (STEMI) ([facility]) I21.3    STEMI (ST elevation myocardial infarction) ([facility]) I21.3    Essential hypertension I10    Alcohol abuse F10.10    Class 1 obesity in adult E66.9    Observed sleep apnea G47.30    Ischemic cardiomyopathy I25.5    Elevated LFTs R79.89       Past Medical History:        Diagnosis Date    Alcohol abuse 6/26/2021    Back pain     Class 1 obesity in adult 6/26/2021    Gastroesophageal reflux disease without esophagitis 12/18/2015    Hypertension     Osteoarthritis        Past Surgical History:  History reviewed. No pertinent surgical history. Social History:    Social History     Tobacco Use    Smoking status: Current Some Day Smoker     Packs/day: 1.00    Smokeless tobacco: Current User     Types: Snuff    Tobacco comment: pt is down to 2 cigarettes a day   Substance Use Topics    Alcohol use:  Yes     Alcohol/week: 42.0 standard drinks     Types: 42 Cans of beer per week     Comment: 6 beers a day                                 Ready to quit: Not Answered  Counseling given: Not Answered  Comment: pt is down to 2 cigarettes a day      Vital Signs (Current):   Vitals:    06/27/21 0700 06/27/21 0800 06/27/21 0900 06/27/21 0928   BP: (!) 155/90 (!) 175/108 (!) 179/104 (!) 154/91   Pulse: 69 89 75    Resp: 9 20 9    Temp:  97.6 °F (36.4 °C)     TempSrc:  Oral     SpO2: 96% 95% 95%    Weight:       Height:                                                  BP Readings from Last 3 Encounters:   06/27/21 (!) 154/91   10/06/17 118/78   03/02/17 116/80       NPO Status:                                                                                 BMI:   Wt Readings from Last 3 Encounters:   06/27/21 216 lb 7.9 oz (98.2 kg)   11/02/17 203 lb 11.3 oz (92.4 kg)   10/06/17 203 lb 9.6 oz (92.4 kg)     Body mass index is 31.97 kg/m². CBC:   Lab Results   Component Value Date    WBC 9.5 06/27/2021    RBC 4.61 06/27/2021    HGB 15.2 06/27/2021    HCT 42.9 06/27/2021    MCV 93.1 06/27/2021    RDW 12.8 06/27/2021     06/27/2021       CMP:   Lab Results   Component Value Date     06/27/2021    K 4.3 06/27/2021     06/27/2021    CO2 24 06/27/2021    BUN 12 06/27/2021    CREATININE 0.8 06/27/2021    GFRAA >60 06/27/2021    AGRATIO 1.5 06/27/2021    LABGLOM >60 06/27/2021    GLUCOSE 124 06/27/2021    PROT 7.0 06/27/2021    CALCIUM 9.2 06/27/2021    BILITOT 0.7 06/27/2021    ALKPHOS 77 06/27/2021     06/27/2021    ALT 69 06/27/2021       POC Tests: No results for input(s): POCGLU, POCNA, POCK, POCCL, POCBUN, POCHEMO, POCHCT in the last 72 hours. Coags:   Lab Results   Component Value Date    PROTIME 12.2 06/27/2021    INR 1.05 06/27/2021       HCG (If Applicable): No results found for: PREGTESTUR, PREGSERUM, HCG, HCGQUANT     ABGs: No results found for: PHART, PO2ART, ZTO9PQC, VMZ7QXM, BEART, K3QGAMFP     Type & Screen (If Applicable):  No results found for: LABABO, LABRH    Drug/Infectious Status (If Applicable):  No results found for: HIV, HEPCAB    COVID-19 Screening (If Applicable): No results found for: COVID19        Anesthesia Evaluation    Airway: Mallampati: II  TM distance: >3 FB   Neck ROM: full  Mouth opening: > = 3 FB Dental:          Pulmonary:   (+) shortness of breath: chronic,  sleep apnea: on noncompliant,  current smoker                           Cardiovascular:    (+) hypertension:, past MI: < 1 month, CAD: obstructive,                   Neuro/Psych:   (+) psychiatric history:            GI/Hepatic/Renal:   (+) GERD:,           Endo/Other:                     Abdominal:             Vascular:           Other Findings:             Anesthesia Plan      general     ASA 4 (  [address] EVALUATION FORM       Name:  Laurel Mccracken                                         Age:  41 y.o. MRN:  [id number]           I discussed intravenous with inhalational endotracheal anesthesia with pulmonary artery catheter, radial ( or other artery if required) catheter, possible transesophageal echocardiography and post-operative ventilation including risks and alternatives with the patient . The patient has no further questions. Romana Dobbs MD  June 27, 2021  9:42 AM)  Induction: inhalational and intravenous. arterial line, BIS, central line, CVP and RANI    Anesthetic plan and risks discussed with patient.                       Romana Dobbs MD   6/27/2021

## 2021-06-28 ENCOUNTER — APPOINTMENT (OUTPATIENT)
Dept: GENERAL RADIOLOGY | Age: 46
DRG: 232 | End: 2021-06-28
Attending: INTERNAL MEDICINE

## 2021-06-28 VITALS
SYSTOLIC BLOOD PRESSURE: 137 MMHG | DIASTOLIC BLOOD PRESSURE: 93 MMHG | RESPIRATION RATE: 7 BRPM | OXYGEN SATURATION: 99 %

## 2021-06-28 LAB
A/G RATIO: 1.3 (ref 1.1–2.2)
ACTIVATED CLOTTING TIME: 104 SEC (ref 99–130)
ACTIVATED CLOTTING TIME: 112 SEC (ref 99–130)
ACTIVATED CLOTTING TIME: 401 SEC (ref 99–130)
ACTIVATED CLOTTING TIME: 445 SEC (ref 99–130)
ACTIVATED CLOTTING TIME: 461 SEC (ref 99–130)
ACTIVATED CLOTTING TIME: 470 SEC (ref 99–130)
ACTIVATED CLOTTING TIME: 513 SEC (ref 99–130)
ACTIVATED CLOTTING TIME: 518 SEC (ref 99–130)
ALBUMIN SERPL-MCNC: 3.9 G/DL (ref 3.4–5)
ALP BLD-CCNC: 79 U/L (ref 40–129)
ALT SERPL-CCNC: 54 U/L (ref 10–40)
ANION GAP SERPL CALCULATED.3IONS-SCNC: 7 MMOL/L (ref 3–16)
ANION GAP SERPL CALCULATED.3IONS-SCNC: 8 MMOL/L (ref 3–16)
APTT: 30.7 SEC (ref 24.2–36.2)
AST SERPL-CCNC: 64 U/L (ref 15–37)
BASE EXCESS ARTERIAL: -1 (ref -3–3)
BASE EXCESS ARTERIAL: -1 (ref -3–3)
BASE EXCESS ARTERIAL: -3 (ref -3–3)
BASE EXCESS ARTERIAL: -3 (ref -3–3)
BASE EXCESS ARTERIAL: 0 (ref -3–3)
BASE EXCESS ARTERIAL: 1 (ref -3–3)
BASE EXCESS ARTERIAL: 1 (ref -3–3)
BASE EXCESS ARTERIAL: 2 (ref -3–3)
BASOPHILS ABSOLUTE: 0.1 K/UL (ref 0–0.2)
BASOPHILS RELATIVE PERCENT: 0.8 %
BILIRUB SERPL-MCNC: 0.7 MG/DL (ref 0–1)
BUN BLDV-MCNC: 13 MG/DL (ref 7–20)
BUN BLDV-MCNC: 14 MG/DL (ref 7–20)
CALCIUM IONIZED: 1.12 MMOL/L (ref 1.12–1.32)
CALCIUM IONIZED: 1.14 MMOL/L (ref 1.12–1.32)
CALCIUM IONIZED: 1.16 MMOL/L (ref 1.12–1.32)
CALCIUM IONIZED: 1.17 MMOL/L (ref 1.12–1.32)
CALCIUM IONIZED: 1.2 MMOL/L (ref 1.12–1.32)
CALCIUM IONIZED: 1.25 MMOL/L (ref 1.12–1.32)
CALCIUM IONIZED: 1.27 MMOL/L (ref 1.12–1.32)
CALCIUM IONIZED: 1.33 MMOL/L (ref 1.12–1.32)
CALCIUM SERPL-MCNC: 8.9 MG/DL (ref 8.3–10.6)
CALCIUM SERPL-MCNC: 9.4 MG/DL (ref 8.3–10.6)
CHLORIDE BLD-SCNC: 102 MMOL/L (ref 99–110)
CHLORIDE BLD-SCNC: 106 MMOL/L (ref 99–110)
CO2: 21 MMOL/L (ref 21–32)
CO2: 27 MMOL/L (ref 21–32)
CREAT SERPL-MCNC: 1 MG/DL (ref 0.9–1.3)
CREAT SERPL-MCNC: 1.1 MG/DL (ref 0.9–1.3)
EOSINOPHILS ABSOLUTE: 0.3 K/UL (ref 0–0.6)
EOSINOPHILS RELATIVE PERCENT: 2.7 %
GFR AFRICAN AMERICAN: >60
GFR AFRICAN AMERICAN: >60
GFR NON-AFRICAN AMERICAN: >60
GFR NON-AFRICAN AMERICAN: >60
GLOBULIN: 3 G/DL
GLUCOSE BLD-MCNC: 102 MG/DL (ref 70–99)
GLUCOSE BLD-MCNC: 115 MG/DL (ref 70–99)
GLUCOSE BLD-MCNC: 117 MG/DL (ref 70–99)
GLUCOSE BLD-MCNC: 118 MG/DL (ref 70–99)
GLUCOSE BLD-MCNC: 119 MG/DL (ref 70–99)
GLUCOSE BLD-MCNC: 120 MG/DL (ref 70–99)
GLUCOSE BLD-MCNC: 121 MG/DL (ref 70–99)
GLUCOSE BLD-MCNC: 121 MG/DL (ref 70–99)
GLUCOSE BLD-MCNC: 137 MG/DL (ref 70–99)
GLUCOSE BLD-MCNC: 143 MG/DL (ref 70–99)
GLUCOSE BLD-MCNC: 157 MG/DL (ref 70–99)
GLUCOSE BLD-MCNC: 157 MG/DL (ref 70–99)
GLUCOSE BLD-MCNC: 159 MG/DL (ref 70–99)
GLUCOSE BLD-MCNC: 164 MG/DL (ref 70–99)
GLUCOSE BLD-MCNC: 96 MG/DL (ref 70–99)
HCO3 ARTERIAL: 21.3 MMOL/L (ref 21–29)
HCO3 ARTERIAL: 22.5 MMOL/L (ref 21–29)
HCO3 ARTERIAL: 24.5 MMOL/L (ref 21–29)
HCO3 ARTERIAL: 24.6 MMOL/L (ref 21–29)
HCO3 ARTERIAL: 25.2 MMOL/L (ref 21–29)
HCO3 ARTERIAL: 25.6 MMOL/L (ref 21–29)
HCO3 ARTERIAL: 25.7 MMOL/L (ref 21–29)
HCO3 ARTERIAL: 26.4 MMOL/L (ref 21–29)
HCT VFR BLD CALC: 37.5 % (ref 40.5–52.5)
HCT VFR BLD CALC: 41.5 % (ref 40.5–52.5)
HEMOGLOBIN: 10.1 GM/DL (ref 13.5–17.5)
HEMOGLOBIN: 10.6 GM/DL (ref 13.5–17.5)
HEMOGLOBIN: 10.6 GM/DL (ref 13.5–17.5)
HEMOGLOBIN: 10.9 GM/DL (ref 13.5–17.5)
HEMOGLOBIN: 12 GM/DL (ref 13.5–17.5)
HEMOGLOBIN: 13.3 G/DL (ref 13.5–17.5)
HEMOGLOBIN: 14.2 GM/DL (ref 13.5–17.5)
HEMOGLOBIN: 14.4 G/DL (ref 13.5–17.5)
HEMOGLOBIN: 9.2 GM/DL (ref 13.5–17.5)
HEMOGLOBIN: 9.7 GM/DL (ref 13.5–17.5)
INR BLD: 1.06 (ref 0.86–1.14)
INR BLD: 1.28 (ref 0.86–1.14)
LACTATE: 1.03 MMOL/L (ref 0.4–2)
LACTATE: 1.36 MMOL/L (ref 0.4–2)
LACTATE: 1.5 MMOL/L (ref 0.4–2)
LYMPHOCYTES ABSOLUTE: 1.8 K/UL (ref 1–5.1)
LYMPHOCYTES RELATIVE PERCENT: 15.9 %
MAGNESIUM: 2.3 MG/DL (ref 1.8–2.4)
MAGNESIUM: 2.4 MG/DL (ref 1.8–2.4)
MAGNESIUM: 3.5 MG/DL (ref 1.8–2.4)
MCH RBC QN AUTO: 32.2 PG (ref 26–34)
MCH RBC QN AUTO: 32.9 PG (ref 26–34)
MCHC RBC AUTO-ENTMCNC: 34.6 G/DL (ref 31–36)
MCHC RBC AUTO-ENTMCNC: 35.4 G/DL (ref 31–36)
MCV RBC AUTO: 93 FL (ref 80–100)
MCV RBC AUTO: 93 FL (ref 80–100)
MONOCYTES ABSOLUTE: 1 K/UL (ref 0–1.3)
MONOCYTES RELATIVE PERCENT: 8.5 %
NEUTROPHILS ABSOLUTE: 8.1 K/UL (ref 1.7–7.7)
NEUTROPHILS RELATIVE PERCENT: 72.1 %
O2 SAT, ARTERIAL: 100 % (ref 93–100)
PCO2 ARTERIAL: 33.5 MM HG (ref 35–45)
PCO2 ARTERIAL: 40.1 MM HG (ref 35–45)
PCO2 ARTERIAL: 42.1 MM HG (ref 35–45)
PCO2 ARTERIAL: 42.3 MM HG (ref 35–45)
PCO2 ARTERIAL: 43.1 MM HG (ref 35–45)
PCO2 ARTERIAL: 43.2 MM HG (ref 35–45)
PCO2 ARTERIAL: 44.3 MM HG (ref 35–45)
PCO2 ARTERIAL: 45.3 MM HG (ref 35–45)
PDW BLD-RTO: 12.7 % (ref 12.4–15.4)
PDW BLD-RTO: 12.8 % (ref 12.4–15.4)
PERFORMED ON: ABNORMAL
PERFORMED ON: NORMAL
PH ARTERIAL: 7.34 (ref 7.35–7.45)
PH ARTERIAL: 7.34 (ref 7.35–7.45)
PH ARTERIAL: 7.37 (ref 7.35–7.45)
PH ARTERIAL: 7.37 (ref 7.35–7.45)
PH ARTERIAL: 7.38 (ref 7.35–7.45)
PH ARTERIAL: 7.4 (ref 7.35–7.45)
PH ARTERIAL: 7.41 (ref 7.35–7.45)
PH ARTERIAL: 7.41 (ref 7.35–7.45)
PHOSPHORUS: 4 MG/DL (ref 2.5–4.9)
PLATELET # BLD: 103 K/UL (ref 135–450)
PLATELET # BLD: 165 K/UL (ref 135–450)
PMV BLD AUTO: 9.2 FL (ref 5–10.5)
PMV BLD AUTO: 9.6 FL (ref 5–10.5)
PO2 ARTERIAL: 189 MM HG (ref 75–108)
PO2 ARTERIAL: 189.3 MM HG (ref 75–108)
PO2 ARTERIAL: 299.9 MM HG (ref 75–108)
PO2 ARTERIAL: 305.9 MM HG (ref 75–108)
PO2 ARTERIAL: 335.1 MM HG (ref 75–108)
PO2 ARTERIAL: 378.1 MM HG (ref 75–108)
PO2 ARTERIAL: 400.9 MM HG (ref 75–108)
PO2 ARTERIAL: 429.5 MM HG (ref 75–108)
POC HEMATOCRIT: 27 % (ref 40.5–52.5)
POC HEMATOCRIT: 28 % (ref 40.5–52.5)
POC HEMATOCRIT: 30 % (ref 40.5–52.5)
POC HEMATOCRIT: 31 % (ref 40.5–52.5)
POC HEMATOCRIT: 31 % (ref 40.5–52.5)
POC HEMATOCRIT: 32 % (ref 40.5–52.5)
POC HEMATOCRIT: 35 % (ref 40.5–52.5)
POC HEMATOCRIT: 42 % (ref 40.5–52.5)
POC POTASSIUM: 4.4 MMOL/L (ref 3.5–5.1)
POC POTASSIUM: 4.6 MMOL/L (ref 3.5–5.1)
POC POTASSIUM: 5.1 MMOL/L (ref 3.5–5.1)
POC POTASSIUM: 5.3 MMOL/L (ref 3.5–5.1)
POC POTASSIUM: 5.5 MMOL/L (ref 3.5–5.1)
POC POTASSIUM: 5.6 MMOL/L (ref 3.5–5.1)
POC POTASSIUM: 6.1 MMOL/L (ref 3.5–5.1)
POC POTASSIUM: 6.2 MMOL/L (ref 3.5–5.1)
POC SAMPLE TYPE: ABNORMAL
POC SODIUM: 130 MMOL/L (ref 136–145)
POC SODIUM: 133 MMOL/L (ref 136–145)
POC SODIUM: 134 MMOL/L (ref 136–145)
POC SODIUM: 134 MMOL/L (ref 136–145)
POC SODIUM: 135 MMOL/L (ref 136–145)
POC SODIUM: 135 MMOL/L (ref 136–145)
POC SODIUM: 138 MMOL/L (ref 136–145)
POC SODIUM: 140 MMOL/L (ref 136–145)
POTASSIUM SERPL-SCNC: 4.2 MMOL/L (ref 3.5–5.1)
POTASSIUM SERPL-SCNC: 4.4 MMOL/L (ref 3.5–5.1)
POTASSIUM SERPL-SCNC: 4.7 MMOL/L (ref 3.5–5.1)
PROTHROMBIN TIME: 12.3 SEC (ref 10–13.2)
PROTHROMBIN TIME: 14.9 SEC (ref 10–13.2)
RBC # BLD: 4.03 M/UL (ref 4.2–5.9)
RBC # BLD: 4.46 M/UL (ref 4.2–5.9)
SODIUM BLD-SCNC: 134 MMOL/L (ref 136–145)
SODIUM BLD-SCNC: 137 MMOL/L (ref 136–145)
TCO2 ARTERIAL: 22 MMOL/L
TCO2 ARTERIAL: 24 MMOL/L
TCO2 ARTERIAL: 26 MMOL/L
TCO2 ARTERIAL: 27 MMOL/L
TCO2 ARTERIAL: 27 MMOL/L
TCO2 ARTERIAL: 28 MMOL/L
TOTAL PROTEIN: 6.9 G/DL (ref 6.4–8.2)
WBC # BLD: 11.2 K/UL (ref 4–11)
WBC # BLD: 24 K/UL (ref 4–11)

## 2021-06-28 PROCEDURE — 2709999900 HC NON-CHARGEABLE SUPPLY: Performed by: THORACIC SURGERY (CARDIOTHORACIC VASCULAR SURGERY)

## 2021-06-28 PROCEDURE — 021209W BYPASS CORONARY ARTERY, THREE ARTERIES FROM AORTA WITH AUTOLOGOUS VENOUS TISSUE, OPEN APPROACH: ICD-10-PCS | Performed by: THORACIC SURGERY (CARDIOTHORACIC VASCULAR SURGERY)

## 2021-06-28 PROCEDURE — 33968 REMOVE AORTIC ASSIST DEVICE: CPT | Performed by: THORACIC SURGERY (CARDIOTHORACIC VASCULAR SURGERY)

## 2021-06-28 PROCEDURE — 02100Z9 BYPASS CORONARY ARTERY, ONE ARTERY FROM LEFT INTERNAL MAMMARY, OPEN APPROACH: ICD-10-PCS | Performed by: THORACIC SURGERY (CARDIOTHORACIC VASCULAR SURGERY)

## 2021-06-28 PROCEDURE — 3E0T3BZ INTRODUCTION OF ANESTHETIC AGENT INTO PERIPHERAL NERVES AND PLEXI, PERCUTANEOUS APPROACH: ICD-10-PCS | Performed by: THORACIC SURGERY (CARDIOTHORACIC VASCULAR SURGERY)

## 2021-06-28 PROCEDURE — 6360000002 HC RX W HCPCS: Performed by: ANESTHESIOLOGY

## 2021-06-28 PROCEDURE — 85014 HEMATOCRIT: CPT

## 2021-06-28 PROCEDURE — 82803 BLOOD GASES ANY COMBINATION: CPT

## 2021-06-28 PROCEDURE — 33508 ENDOSCOPIC VEIN HARVEST: CPT | Performed by: THORACIC SURGERY (CARDIOTHORACIC VASCULAR SURGERY)

## 2021-06-28 PROCEDURE — 3600000018 HC SURGERY OHS ADDTL 15MIN: Performed by: THORACIC SURGERY (CARDIOTHORACIC VASCULAR SURGERY)

## 2021-06-28 PROCEDURE — 3700000000 HC ANESTHESIA ATTENDED CARE: Performed by: THORACIC SURGERY (CARDIOTHORACIC VASCULAR SURGERY)

## 2021-06-28 PROCEDURE — 71045 X-RAY EXAM CHEST 1 VIEW: CPT

## 2021-06-28 PROCEDURE — 3E080GC INTRODUCTION OF OTHER THERAPEUTIC SUBSTANCE INTO HEART, OPEN APPROACH: ICD-10-PCS | Performed by: THORACIC SURGERY (CARDIOTHORACIC VASCULAR SURGERY)

## 2021-06-28 PROCEDURE — B24BZZ4 ULTRASONOGRAPHY OF HEART WITH AORTA, TRANSESOPHAGEAL: ICD-10-PCS | Performed by: THORACIC SURGERY (CARDIOTHORACIC VASCULAR SURGERY)

## 2021-06-28 PROCEDURE — 2580000003 HC RX 258: Performed by: ANESTHESIOLOGY

## 2021-06-28 PROCEDURE — 33519 CABG ARTERY-VEIN THREE: CPT | Performed by: THORACIC SURGERY (CARDIOTHORACIC VASCULAR SURGERY)

## 2021-06-28 PROCEDURE — 06BP4ZZ EXCISION OF RIGHT SAPHENOUS VEIN, PERCUTANEOUS ENDOSCOPIC APPROACH: ICD-10-PCS | Performed by: THORACIC SURGERY (CARDIOTHORACIC VASCULAR SURGERY)

## 2021-06-28 PROCEDURE — 2500000003 HC RX 250 WO HCPCS: Performed by: ANESTHESIOLOGY

## 2021-06-28 PROCEDURE — 33533 CABG ARTERIAL SINGLE: CPT | Performed by: THORACIC SURGERY (CARDIOTHORACIC VASCULAR SURGERY)

## 2021-06-28 PROCEDURE — 85730 THROMBOPLASTIN TIME PARTIAL: CPT

## 2021-06-28 PROCEDURE — 84295 ASSAY OF SERUM SODIUM: CPT

## 2021-06-28 PROCEDURE — 80053 COMPREHEN METABOLIC PANEL: CPT

## 2021-06-28 PROCEDURE — 2100000000 HC CCU R&B

## 2021-06-28 PROCEDURE — 94660 CPAP INITIATION&MGMT: CPT

## 2021-06-28 PROCEDURE — 85610 PROTHROMBIN TIME: CPT

## 2021-06-28 PROCEDURE — 83735 ASSAY OF MAGNESIUM: CPT

## 2021-06-28 PROCEDURE — C1889 IMPLANT/INSERT DEVICE, NOC: HCPCS | Performed by: THORACIC SURGERY (CARDIOTHORACIC VASCULAR SURGERY)

## 2021-06-28 PROCEDURE — 6360000002 HC RX W HCPCS: Performed by: THORACIC SURGERY (CARDIOTHORACIC VASCULAR SURGERY)

## 2021-06-28 PROCEDURE — 85347 COAGULATION TIME ACTIVATED: CPT

## 2021-06-28 PROCEDURE — 2580000003 HC RX 258: Performed by: THORACIC SURGERY (CARDIOTHORACIC VASCULAR SURGERY)

## 2021-06-28 PROCEDURE — 82947 ASSAY GLUCOSE BLOOD QUANT: CPT

## 2021-06-28 PROCEDURE — 94669 MECHANICAL CHEST WALL OSCILL: CPT

## 2021-06-28 PROCEDURE — 6370000000 HC RX 637 (ALT 250 FOR IP): Performed by: THORACIC SURGERY (CARDIOTHORACIC VASCULAR SURGERY)

## 2021-06-28 PROCEDURE — 94761 N-INVAS EAR/PLS OXIMETRY MLT: CPT

## 2021-06-28 PROCEDURE — 5A1221Z PERFORMANCE OF CARDIAC OUTPUT, CONTINUOUS: ICD-10-PCS | Performed by: THORACIC SURGERY (CARDIOTHORACIC VASCULAR SURGERY)

## 2021-06-28 PROCEDURE — 82330 ASSAY OF CALCIUM: CPT

## 2021-06-28 PROCEDURE — 02HV33Z INSERTION OF INFUSION DEVICE INTO SUPERIOR VENA CAVA, PERCUTANEOUS APPROACH: ICD-10-PCS | Performed by: THORACIC SURGERY (CARDIOTHORACIC VASCULAR SURGERY)

## 2021-06-28 PROCEDURE — C1786 PMKR, SINGLE, RATE-RESP: HCPCS | Performed by: THORACIC SURGERY (CARDIOTHORACIC VASCULAR SURGERY)

## 2021-06-28 PROCEDURE — 2500000003 HC RX 250 WO HCPCS: Performed by: THORACIC SURGERY (CARDIOTHORACIC VASCULAR SURGERY)

## 2021-06-28 PROCEDURE — 85027 COMPLETE CBC AUTOMATED: CPT

## 2021-06-28 PROCEDURE — 3700000001 HC ADD 15 MINUTES (ANESTHESIA): Performed by: THORACIC SURGERY (CARDIOTHORACIC VASCULAR SURGERY)

## 2021-06-28 PROCEDURE — 2720000010 HC SURG SUPPLY STERILE: Performed by: THORACIC SURGERY (CARDIOTHORACIC VASCULAR SURGERY)

## 2021-06-28 PROCEDURE — 5A2204Z RESTORATION OF CARDIAC RHYTHM, SINGLE: ICD-10-PCS | Performed by: THORACIC SURGERY (CARDIOTHORACIC VASCULAR SURGERY)

## 2021-06-28 PROCEDURE — 85025 COMPLETE CBC W/AUTO DIFF WBC: CPT

## 2021-06-28 PROCEDURE — 3600000008 HC SURGERY OHS BASE: Performed by: THORACIC SURGERY (CARDIOTHORACIC VASCULAR SURGERY)

## 2021-06-28 PROCEDURE — 02L70CK OCCLUSION OF LEFT ATRIAL APPENDAGE WITH EXTRALUMINAL DEVICE, OPEN APPROACH: ICD-10-PCS | Performed by: THORACIC SURGERY (CARDIOTHORACIC VASCULAR SURGERY)

## 2021-06-28 PROCEDURE — 84100 ASSAY OF PHOSPHORUS: CPT

## 2021-06-28 PROCEDURE — 83605 ASSAY OF LACTIC ACID: CPT

## 2021-06-28 PROCEDURE — 84132 ASSAY OF SERUM POTASSIUM: CPT

## 2021-06-28 PROCEDURE — 2700000000 HC OXYGEN THERAPY PER DAY

## 2021-06-28 PROCEDURE — C9290 INJ, BUPIVACAINE LIPOSOME: HCPCS | Performed by: THORACIC SURGERY (CARDIOTHORACIC VASCULAR SURGERY)

## 2021-06-28 PROCEDURE — 0W9D0ZZ DRAINAGE OF PERICARDIAL CAVITY, OPEN APPROACH: ICD-10-PCS | Performed by: THORACIC SURGERY (CARDIOTHORACIC VASCULAR SURGERY)

## 2021-06-28 DEVICE — Z INACTIVE USE 2124449 DEVICE OCCL CLP L35MM SM FOOTPRINT 1 HND APPL SUTURELESS W/: Type: IMPLANTABLE DEVICE | Site: HEART | Status: FUNCTIONAL

## 2021-06-28 RX ORDER — KETOROLAC TROMETHAMINE 30 MG/ML
15 INJECTION, SOLUTION INTRAMUSCULAR; INTRAVENOUS EVERY 6 HOURS
Status: COMPLETED | OUTPATIENT
Start: 2021-06-28 | End: 2021-06-30

## 2021-06-28 RX ORDER — ALBUTEROL SULFATE 90 UG/1
2 AEROSOL, METERED RESPIRATORY (INHALATION) EVERY 6 HOURS PRN
Status: DISCONTINUED | OUTPATIENT
Start: 2021-06-28 | End: 2021-07-02 | Stop reason: HOSPADM

## 2021-06-28 RX ORDER — DEXTROSE MONOHYDRATE 25 G/50ML
12.5 INJECTION, SOLUTION INTRAVENOUS PRN
Status: DISCONTINUED | OUTPATIENT
Start: 2021-06-28 | End: 2021-06-30

## 2021-06-28 RX ORDER — SODIUM CHLORIDE 9 MG/ML
INJECTION, SOLUTION INTRAVENOUS CONTINUOUS PRN
Status: DISCONTINUED | OUTPATIENT
Start: 2021-06-28 | End: 2021-06-28 | Stop reason: SDUPTHER

## 2021-06-28 RX ORDER — MORPHINE SULFATE 2 MG/ML
2 INJECTION, SOLUTION INTRAMUSCULAR; INTRAVENOUS
Status: DISCONTINUED | OUTPATIENT
Start: 2021-06-28 | End: 2021-07-02 | Stop reason: HOSPADM

## 2021-06-28 RX ORDER — NITROGLYCERIN 40 MG/1
1 PATCH TRANSDERMAL DAILY
Status: DISPENSED | OUTPATIENT
Start: 2021-06-29 | End: 2021-07-02

## 2021-06-28 RX ORDER — NITROGLYCERIN 20 MG/100ML
10 INJECTION INTRAVENOUS CONTINUOUS PRN
Status: DISCONTINUED | OUTPATIENT
Start: 2021-06-28 | End: 2021-06-30

## 2021-06-28 RX ORDER — CISATRACURIUM BESYLATE 2 MG/ML
INJECTION, SOLUTION INTRAVENOUS PRN
Status: DISCONTINUED | OUTPATIENT
Start: 2021-06-28 | End: 2021-06-28 | Stop reason: SDUPTHER

## 2021-06-28 RX ORDER — SODIUM CHLORIDE, SODIUM LACTATE, POTASSIUM CHLORIDE, AND CALCIUM CHLORIDE .6; .31; .03; .02 G/100ML; G/100ML; G/100ML; G/100ML
250 INJECTION, SOLUTION INTRAVENOUS CONTINUOUS PRN
Status: DISCONTINUED | OUTPATIENT
Start: 2021-06-28 | End: 2021-06-30

## 2021-06-28 RX ORDER — ACETAMINOPHEN 650 MG/1
SUPPOSITORY RECTAL PRN
Status: DISCONTINUED | OUTPATIENT
Start: 2021-06-28 | End: 2021-06-28 | Stop reason: ALTCHOICE

## 2021-06-28 RX ORDER — HYDRALAZINE HYDROCHLORIDE 20 MG/ML
INJECTION INTRAMUSCULAR; INTRAVENOUS PRN
Status: DISCONTINUED | OUTPATIENT
Start: 2021-06-28 | End: 2021-06-28 | Stop reason: SDUPTHER

## 2021-06-28 RX ORDER — FONDAPARINUX SODIUM 2.5 MG/.5ML
2.5 INJECTION SUBCUTANEOUS DAILY
Status: DISCONTINUED | OUTPATIENT
Start: 2021-06-29 | End: 2021-07-02 | Stop reason: HOSPADM

## 2021-06-28 RX ORDER — KETOROLAC TROMETHAMINE 30 MG/ML
INJECTION, SOLUTION INTRAMUSCULAR; INTRAVENOUS PRN
Status: DISCONTINUED | OUTPATIENT
Start: 2021-06-28 | End: 2021-06-28 | Stop reason: SDUPTHER

## 2021-06-28 RX ORDER — MORPHINE SULFATE 4 MG/ML
4 INJECTION, SOLUTION INTRAMUSCULAR; INTRAVENOUS
Status: DISCONTINUED | OUTPATIENT
Start: 2021-06-28 | End: 2021-07-02 | Stop reason: HOSPADM

## 2021-06-28 RX ORDER — DIPHENHYDRAMINE HCL 25 MG
25 TABLET ORAL NIGHTLY PRN
Status: DISCONTINUED | OUTPATIENT
Start: 2021-06-29 | End: 2021-07-02 | Stop reason: HOSPADM

## 2021-06-28 RX ORDER — LABETALOL HYDROCHLORIDE 5 MG/ML
INJECTION, SOLUTION INTRAVENOUS PRN
Status: DISCONTINUED | OUTPATIENT
Start: 2021-06-28 | End: 2021-06-28 | Stop reason: SDUPTHER

## 2021-06-28 RX ORDER — MILRINONE LACTATE 0.2 MG/ML
0.38 INJECTION, SOLUTION INTRAVENOUS CONTINUOUS
Status: DISCONTINUED | OUTPATIENT
Start: 2021-06-28 | End: 2021-06-30

## 2021-06-28 RX ORDER — SODIUM CHLORIDE 9 MG/ML
INJECTION, SOLUTION INTRAVENOUS CONTINUOUS PRN
Status: DISCONTINUED | OUTPATIENT
Start: 2021-06-28 | End: 2021-06-28

## 2021-06-28 RX ORDER — SODIUM CHLORIDE, SODIUM LACTATE, POTASSIUM CHLORIDE, CALCIUM CHLORIDE 600; 310; 30; 20 MG/100ML; MG/100ML; MG/100ML; MG/100ML
INJECTION, SOLUTION INTRAVENOUS CONTINUOUS PRN
Status: DISCONTINUED | OUTPATIENT
Start: 2021-06-28 | End: 2021-06-28 | Stop reason: SDUPTHER

## 2021-06-28 RX ORDER — DOBUTAMINE 12.5 MG/ML
INJECTION, SOLUTION INTRAVENOUS CONTINUOUS PRN
Status: DISCONTINUED | OUTPATIENT
Start: 2021-06-28 | End: 2021-06-28 | Stop reason: SDUPTHER

## 2021-06-28 RX ORDER — OXYCODONE HYDROCHLORIDE 5 MG/1
10 TABLET ORAL EVERY 4 HOURS PRN
Status: DISCONTINUED | OUTPATIENT
Start: 2021-06-28 | End: 2021-07-02 | Stop reason: HOSPADM

## 2021-06-28 RX ORDER — POTASSIUM CHLORIDE 750 MG/1
10 TABLET, EXTENDED RELEASE ORAL
Status: DISCONTINUED | OUTPATIENT
Start: 2021-06-29 | End: 2021-07-01

## 2021-06-28 RX ORDER — PROTAMINE SULFATE 10 MG/ML
50 INJECTION, SOLUTION INTRAVENOUS
Status: ACTIVE | OUTPATIENT
Start: 2021-06-28 | End: 2021-06-28

## 2021-06-28 RX ORDER — MIDAZOLAM HYDROCHLORIDE 1 MG/ML
1 INJECTION INTRAMUSCULAR; INTRAVENOUS
Status: DISCONTINUED | OUTPATIENT
Start: 2021-06-28 | End: 2021-06-30

## 2021-06-28 RX ORDER — INSULIN GLARGINE 100 [IU]/ML
0.15 INJECTION, SOLUTION SUBCUTANEOUS NIGHTLY
Status: DISCONTINUED | OUTPATIENT
Start: 2021-06-29 | End: 2021-06-30

## 2021-06-28 RX ORDER — NEOSTIGMINE METHYLSULFATE 1 MG/ML
INJECTION, SOLUTION INTRAVENOUS PRN
Status: DISCONTINUED | OUTPATIENT
Start: 2021-06-28 | End: 2021-06-28 | Stop reason: SDUPTHER

## 2021-06-28 RX ORDER — DEXTROSE MONOHYDRATE 50 MG/ML
100 INJECTION, SOLUTION INTRAVENOUS PRN
Status: DISCONTINUED | OUTPATIENT
Start: 2021-06-28 | End: 2021-06-30

## 2021-06-28 RX ORDER — FUROSEMIDE 10 MG/ML
40 INJECTION INTRAMUSCULAR; INTRAVENOUS 2 TIMES DAILY
Status: DISCONTINUED | OUTPATIENT
Start: 2021-06-29 | End: 2021-07-01

## 2021-06-28 RX ORDER — ASPIRIN 300 MG/1
300 SUPPOSITORY RECTAL ONCE
Status: COMPLETED | OUTPATIENT
Start: 2021-06-28 | End: 2021-06-28

## 2021-06-28 RX ORDER — SODIUM CHLORIDE 0.9 % (FLUSH) 0.9 %
10 SYRINGE (ML) INJECTION PRN
Status: DISCONTINUED | OUTPATIENT
Start: 2021-06-28 | End: 2021-07-02 | Stop reason: HOSPADM

## 2021-06-28 RX ORDER — SODIUM CHLORIDE 0.9 % (FLUSH) 0.9 %
10 SYRINGE (ML) INJECTION EVERY 12 HOURS SCHEDULED
Status: DISCONTINUED | OUTPATIENT
Start: 2021-06-28 | End: 2021-07-02 | Stop reason: HOSPADM

## 2021-06-28 RX ORDER — MIDAZOLAM HYDROCHLORIDE 1 MG/ML
INJECTION INTRAMUSCULAR; INTRAVENOUS PRN
Status: DISCONTINUED | OUTPATIENT
Start: 2021-06-28 | End: 2021-06-28 | Stop reason: SDUPTHER

## 2021-06-28 RX ORDER — DOBUTAMINE HYDROCHLORIDE 200 MG/100ML
2 INJECTION INTRAVENOUS CONTINUOUS PRN
Status: DISCONTINUED | OUTPATIENT
Start: 2021-06-28 | End: 2021-06-30

## 2021-06-28 RX ORDER — MEPERIDINE HYDROCHLORIDE 50 MG/ML
25 INJECTION INTRAMUSCULAR; INTRAVENOUS; SUBCUTANEOUS
Status: ACTIVE | OUTPATIENT
Start: 2021-06-28 | End: 2021-06-28

## 2021-06-28 RX ORDER — SODIUM CHLORIDE, SODIUM LACTATE, POTASSIUM CHLORIDE, CALCIUM CHLORIDE 600; 310; 30; 20 MG/100ML; MG/100ML; MG/100ML; MG/100ML
INJECTION, SOLUTION INTRAVENOUS CONTINUOUS
Status: DISCONTINUED | OUTPATIENT
Start: 2021-06-28 | End: 2021-06-29

## 2021-06-28 RX ORDER — OXYCODONE HYDROCHLORIDE 5 MG/1
5 TABLET ORAL EVERY 4 HOURS PRN
Status: DISCONTINUED | OUTPATIENT
Start: 2021-06-28 | End: 2021-07-02 | Stop reason: HOSPADM

## 2021-06-28 RX ORDER — HEPARIN SODIUM 1000 [USP'U]/ML
INJECTION, SOLUTION INTRAVENOUS; SUBCUTANEOUS PRN
Status: DISCONTINUED | OUTPATIENT
Start: 2021-06-28 | End: 2021-06-28 | Stop reason: SDUPTHER

## 2021-06-28 RX ORDER — POLYETHYLENE GLYCOL 3350 17 G/17G
17 POWDER, FOR SOLUTION ORAL DAILY
Status: DISCONTINUED | OUTPATIENT
Start: 2021-06-29 | End: 2021-07-02 | Stop reason: HOSPADM

## 2021-06-28 RX ORDER — GLYCOPYRROLATE 0.2 MG/ML
INJECTION INTRAMUSCULAR; INTRAVENOUS PRN
Status: DISCONTINUED | OUTPATIENT
Start: 2021-06-28 | End: 2021-06-28 | Stop reason: SDUPTHER

## 2021-06-28 RX ORDER — POTASSIUM CHLORIDE 29.8 MG/ML
20 INJECTION INTRAVENOUS PRN
Status: DISCONTINUED | OUTPATIENT
Start: 2021-06-28 | End: 2021-07-01

## 2021-06-28 RX ORDER — ONDANSETRON 2 MG/ML
INJECTION INTRAMUSCULAR; INTRAVENOUS PRN
Status: DISCONTINUED | OUTPATIENT
Start: 2021-06-28 | End: 2021-06-28 | Stop reason: SDUPTHER

## 2021-06-28 RX ORDER — NICOTINE POLACRILEX 4 MG
15 LOZENGE BUCCAL PRN
Status: DISCONTINUED | OUTPATIENT
Start: 2021-06-28 | End: 2021-06-30

## 2021-06-28 RX ORDER — ASPIRIN 81 MG/1
81 TABLET ORAL DAILY
Status: DISCONTINUED | OUTPATIENT
Start: 2021-06-29 | End: 2021-07-02 | Stop reason: HOSPADM

## 2021-06-28 RX ORDER — PROTAMINE SULFATE 10 MG/ML
INJECTION, SOLUTION INTRAVENOUS PRN
Status: DISCONTINUED | OUTPATIENT
Start: 2021-06-28 | End: 2021-06-28 | Stop reason: SDUPTHER

## 2021-06-28 RX ORDER — SUFENTANIL CITRATE 50 UG/ML
INJECTION EPIDURAL; INTRAVENOUS PRN
Status: DISCONTINUED | OUTPATIENT
Start: 2021-06-28 | End: 2021-06-28 | Stop reason: SDUPTHER

## 2021-06-28 RX ORDER — ALBUMIN, HUMAN INJ 5% 5 %
25 SOLUTION INTRAVENOUS PRN
Status: DISCONTINUED | OUTPATIENT
Start: 2021-06-28 | End: 2021-06-30

## 2021-06-28 RX ORDER — CALCIUM CHLORIDE 100 MG/ML
INJECTION INTRAVENOUS; INTRAVENTRICULAR PRN
Status: DISCONTINUED | OUTPATIENT
Start: 2021-06-28 | End: 2021-06-28 | Stop reason: SDUPTHER

## 2021-06-28 RX ORDER — ATORVASTATIN CALCIUM 80 MG/1
80 TABLET, FILM COATED ORAL NIGHTLY
Status: DISCONTINUED | OUTPATIENT
Start: 2021-06-29 | End: 2021-07-02 | Stop reason: HOSPADM

## 2021-06-28 RX ORDER — ACETAMINOPHEN 325 MG/1
650 TABLET ORAL EVERY 6 HOURS
Status: DISCONTINUED | OUTPATIENT
Start: 2021-06-29 | End: 2021-07-01

## 2021-06-28 RX ORDER — ONDANSETRON 2 MG/ML
4 INJECTION INTRAMUSCULAR; INTRAVENOUS EVERY 8 HOURS PRN
Status: DISCONTINUED | OUTPATIENT
Start: 2021-06-28 | End: 2021-07-02 | Stop reason: HOSPADM

## 2021-06-28 RX ORDER — MAGNESIUM SULFATE IN WATER 40 MG/ML
2000 INJECTION, SOLUTION INTRAVENOUS PRN
Status: DISCONTINUED | OUTPATIENT
Start: 2021-06-28 | End: 2021-07-01

## 2021-06-28 RX ORDER — FUROSEMIDE 10 MG/ML
40 INJECTION INTRAMUSCULAR; INTRAVENOUS
Status: ACTIVE | OUTPATIENT
Start: 2021-06-28 | End: 2021-06-28

## 2021-06-28 RX ORDER — DEXAMETHASONE SODIUM PHOSPHATE 10 MG/ML
INJECTION INTRAMUSCULAR; INTRAVENOUS PRN
Status: DISCONTINUED | OUTPATIENT
Start: 2021-06-28 | End: 2021-06-28 | Stop reason: SDUPTHER

## 2021-06-28 RX ORDER — SODIUM CHLORIDE 9 MG/ML
25 INJECTION, SOLUTION INTRAVENOUS PRN
Status: DISCONTINUED | OUTPATIENT
Start: 2021-06-28 | End: 2021-07-02 | Stop reason: HOSPADM

## 2021-06-28 RX ORDER — BISACODYL 10 MG
10 SUPPOSITORY, RECTAL RECTAL DAILY PRN
Status: DISCONTINUED | OUTPATIENT
Start: 2021-06-28 | End: 2021-07-02 | Stop reason: HOSPADM

## 2021-06-28 RX ORDER — CHLORHEXIDINE GLUCONATE 0.12 MG/ML
15 RINSE ORAL 2 TIMES DAILY
Status: DISCONTINUED | OUTPATIENT
Start: 2021-06-28 | End: 2021-07-02 | Stop reason: HOSPADM

## 2021-06-28 RX ORDER — AMINOCAPROIC ACID 250 MG/ML
INJECTION, SOLUTION INTRAVENOUS PRN
Status: DISCONTINUED | OUTPATIENT
Start: 2021-06-28 | End: 2021-06-28 | Stop reason: SDUPTHER

## 2021-06-28 RX ADMIN — MUPIROCIN: 20 OINTMENT TOPICAL at 20:27

## 2021-06-28 RX ADMIN — CISATRACURIUM BESYLATE 10 MG: 2 INJECTION INTRAVENOUS at 08:00

## 2021-06-28 RX ADMIN — SODIUM CHLORIDE, SODIUM LACTATE, POTASSIUM CHLORIDE, AND CALCIUM CHLORIDE: .6; .31; .03; .02 INJECTION, SOLUTION INTRAVENOUS at 13:38

## 2021-06-28 RX ADMIN — CISATRACURIUM BESYLATE 10 MG: 2 INJECTION INTRAVENOUS at 11:00

## 2021-06-28 RX ADMIN — MORPHINE SULFATE 4 MG: 4 INJECTION, SOLUTION INTRAMUSCULAR; INTRAVENOUS at 23:23

## 2021-06-28 RX ADMIN — CEFAZOLIN 1 G: 10 INJECTION, POWDER, FOR SOLUTION INTRAVENOUS at 11:35

## 2021-06-28 RX ADMIN — PROTAMINE SULFATE 25 MG: 10 INJECTION, SOLUTION INTRAVENOUS at 12:47

## 2021-06-28 RX ADMIN — VANCOMYCIN HYDROCHLORIDE 1 G: 1 INJECTION, POWDER, LYOPHILIZED, FOR SOLUTION INTRAVENOUS at 07:38

## 2021-06-28 RX ADMIN — MIDAZOLAM 5 MG: 1 INJECTION INTRAMUSCULAR; INTRAVENOUS at 06:22

## 2021-06-28 RX ADMIN — SUFENTANIL CITRATE 25 MCG: 50 INJECTION, SOLUTION EPIDURAL; INTRAVENOUS at 07:05

## 2021-06-28 RX ADMIN — MORPHINE SULFATE 4 MG: 4 INJECTION, SOLUTION INTRAMUSCULAR; INTRAVENOUS at 16:43

## 2021-06-28 RX ADMIN — Medication 5 MG: at 12:59

## 2021-06-28 RX ADMIN — CEFAZOLIN 2000 MG: 10 INJECTION, POWDER, FOR SOLUTION INTRAVENOUS at 20:29

## 2021-06-28 RX ADMIN — HYDRALAZINE HYDROCHLORIDE 4 MG: 20 INJECTION INTRAMUSCULAR; INTRAVENOUS at 13:30

## 2021-06-28 RX ADMIN — KETOROLAC TROMETHAMINE 60 MG: 30 INJECTION, SOLUTION INTRAMUSCULAR; INTRAVENOUS at 12:07

## 2021-06-28 RX ADMIN — Medication 1500 MG: at 21:29

## 2021-06-28 RX ADMIN — SUFENTANIL CITRATE 25 MCG: 50 INJECTION, SOLUTION EPIDURAL; INTRAVENOUS at 07:48

## 2021-06-28 RX ADMIN — FAMOTIDINE 20 MG: 10 INJECTION, SOLUTION INTRAVENOUS at 20:27

## 2021-06-28 RX ADMIN — SODIUM CHLORIDE, SODIUM LACTATE, POTASSIUM CHLORIDE, AND CALCIUM CHLORIDE: .6; .31; .03; .02 INJECTION, SOLUTION INTRAVENOUS at 06:55

## 2021-06-28 RX ADMIN — CISATRACURIUM BESYLATE 20 MG: 2 INJECTION INTRAVENOUS at 07:07

## 2021-06-28 RX ADMIN — CISATRACURIUM BESYLATE 10 MG: 2 INJECTION INTRAVENOUS at 10:00

## 2021-06-28 RX ADMIN — SODIUM CHLORIDE: 9 INJECTION, SOLUTION INTRAVENOUS at 11:45

## 2021-06-28 RX ADMIN — HYDRALAZINE HYDROCHLORIDE 4 MG: 20 INJECTION INTRAMUSCULAR; INTRAVENOUS at 13:25

## 2021-06-28 RX ADMIN — CEFAZOLIN 2 G: 10 INJECTION, POWDER, FOR SOLUTION INTRAVENOUS at 07:38

## 2021-06-28 RX ADMIN — PROTAMINE SULFATE 25 MG: 10 INJECTION, SOLUTION INTRAVENOUS at 12:38

## 2021-06-28 RX ADMIN — KETOROLAC TROMETHAMINE 15 MG: 30 INJECTION, SOLUTION INTRAMUSCULAR; INTRAVENOUS at 20:27

## 2021-06-28 RX ADMIN — KETOROLAC TROMETHAMINE 15 MG: 30 INJECTION, SOLUTION INTRAMUSCULAR; INTRAVENOUS at 15:53

## 2021-06-28 RX ADMIN — SUFENTANIL CITRATE 25 MCG: 50 INJECTION, SOLUTION EPIDURAL; INTRAVENOUS at 07:55

## 2021-06-28 RX ADMIN — CALCIUM CHLORIDE 0.5 G: 100 INJECTION, SOLUTION INTRAVENOUS at 11:55

## 2021-06-28 RX ADMIN — DEXAMETHASONE SODIUM PHOSPHATE 10 MG: 10 INJECTION INTRAMUSCULAR; INTRAVENOUS at 12:07

## 2021-06-28 RX ADMIN — SODIUM CHLORIDE, SODIUM LACTATE, POTASSIUM CHLORIDE, AND CALCIUM CHLORIDE: .6; .31; .03; .02 INJECTION, SOLUTION INTRAVENOUS at 12:31

## 2021-06-28 RX ADMIN — Medication 15 ML: at 20:27

## 2021-06-28 RX ADMIN — CISATRACURIUM BESYLATE 5 MG: 2 INJECTION INTRAVENOUS at 11:55

## 2021-06-28 RX ADMIN — HYDRALAZINE HYDROCHLORIDE 4 MG: 20 INJECTION INTRAMUSCULAR; INTRAVENOUS at 12:43

## 2021-06-28 RX ADMIN — CALCIUM CHLORIDE 0.5 G: 100 INJECTION, SOLUTION INTRAVENOUS at 12:22

## 2021-06-28 RX ADMIN — HEPARIN SODIUM 35000 UNITS: 1000 INJECTION, SOLUTION INTRAVENOUS; SUBCUTANEOUS at 08:55

## 2021-06-28 RX ADMIN — GLYCOPYRROLATE 0.4 MG: 0.2 INJECTION, SOLUTION INTRAMUSCULAR; INTRAVENOUS at 13:00

## 2021-06-28 RX ADMIN — CISATRACURIUM BESYLATE 10 MG: 2 INJECTION INTRAVENOUS at 09:00

## 2021-06-28 RX ADMIN — ONDANSETRON 4 MG: 2 INJECTION INTRAMUSCULAR; INTRAVENOUS at 12:07

## 2021-06-28 RX ADMIN — LABETALOL HYDROCHLORIDE 2 MG: 5 INJECTION, SOLUTION INTRAVENOUS at 12:13

## 2021-06-28 RX ADMIN — MIDAZOLAM HYDROCHLORIDE 4 MG: 2 INJECTION, SOLUTION INTRAMUSCULAR; INTRAVENOUS at 07:05

## 2021-06-28 RX ADMIN — SUFENTANIL CITRATE 25 MCG: 50 INJECTION, SOLUTION EPIDURAL; INTRAVENOUS at 08:13

## 2021-06-28 RX ADMIN — ASPIRIN 300 MG: 300 SUPPOSITORY RECTAL at 20:28

## 2021-06-28 RX ADMIN — PROTAMINE SULFATE 450 MG: 10 INJECTION, SOLUTION INTRAVENOUS at 12:02

## 2021-06-28 RX ADMIN — DOBUTAMINE 1 MCG/KG/MIN: 12.5 INJECTION, SOLUTION, CONCENTRATE INTRAVENOUS at 11:45

## 2021-06-28 RX ADMIN — SODIUM CHLORIDE, POTASSIUM CHLORIDE, SODIUM LACTATE AND CALCIUM CHLORIDE: 600; 310; 30; 20 INJECTION, SOLUTION INTRAVENOUS at 16:24

## 2021-06-28 RX ADMIN — MORPHINE SULFATE 4 MG: 4 INJECTION, SOLUTION INTRAMUSCULAR; INTRAVENOUS at 19:17

## 2021-06-28 RX ADMIN — AMINOCAPROIC ACID 5000 MG: 250 INJECTION, SOLUTION INTRAVENOUS at 09:00

## 2021-06-28 ASSESSMENT — PULMONARY FUNCTION TESTS
PIF_VALUE: 20
PIF_VALUE: 1
PIF_VALUE: 20
PIF_VALUE: 1
PIF_VALUE: 19
PIF_VALUE: 20
PIF_VALUE: 22
PIF_VALUE: 20
PIF_VALUE: 1
PIF_VALUE: 18
PIF_VALUE: 1
PIF_VALUE: 21
PIF_VALUE: 1
PIF_VALUE: 1
PIF_VALUE: 21
PIF_VALUE: 20
PIF_VALUE: 20
PIF_VALUE: 21
PIF_VALUE: 1
PIF_VALUE: 1
PIF_VALUE: 20
PIF_VALUE: 21
PIF_VALUE: 1
PIF_VALUE: 22
PIF_VALUE: 1
PIF_VALUE: 20
PIF_VALUE: 20
PIF_VALUE: 21
PIF_VALUE: 1
PIF_VALUE: 1
PIF_VALUE: 21
PIF_VALUE: 1
PIF_VALUE: 20
PIF_VALUE: 1
PIF_VALUE: 1
PIF_VALUE: 20
PIF_VALUE: 1
PIF_VALUE: 1
PIF_VALUE: 20
PIF_VALUE: 21
PIF_VALUE: 19
PIF_VALUE: 20
PIF_VALUE: 26
PIF_VALUE: 1
PIF_VALUE: 1
PIF_VALUE: 19
PIF_VALUE: 20
PIF_VALUE: 1
PIF_VALUE: 18
PIF_VALUE: 21
PIF_VALUE: 1
PIF_VALUE: 1
PIF_VALUE: 19
PIF_VALUE: 20
PIF_VALUE: 11
PIF_VALUE: 1
PIF_VALUE: 21
PIF_VALUE: 20
PIF_VALUE: 1
PIF_VALUE: 20
PIF_VALUE: 1
PIF_VALUE: 5
PIF_VALUE: 21
PIF_VALUE: 1
PIF_VALUE: 20
PIF_VALUE: 0
PIF_VALUE: 21
PIF_VALUE: 0
PIF_VALUE: 21
PIF_VALUE: 1
PIF_VALUE: 21
PIF_VALUE: 21
PIF_VALUE: 20
PIF_VALUE: 1
PIF_VALUE: 20
PIF_VALUE: 1
PIF_VALUE: 21
PIF_VALUE: 18
PIF_VALUE: 1
PIF_VALUE: 20
PIF_VALUE: 1
PIF_VALUE: 20
PIF_VALUE: 1
PIF_VALUE: 23
PIF_VALUE: 20
PIF_VALUE: 1
PIF_VALUE: 1
PIF_VALUE: 19
PIF_VALUE: 21
PIF_VALUE: 22
PIF_VALUE: 20
PIF_VALUE: 19
PIF_VALUE: 20
PIF_VALUE: 3
PIF_VALUE: 20
PIF_VALUE: 1
PIF_VALUE: 20
PIF_VALUE: 1
PIF_VALUE: 1
PIF_VALUE: 19
PIF_VALUE: 1
PIF_VALUE: 20
PIF_VALUE: 21
PIF_VALUE: 1
PIF_VALUE: 1
PIF_VALUE: 24
PIF_VALUE: 1
PIF_VALUE: 1
PIF_VALUE: 19
PIF_VALUE: 20
PIF_VALUE: 19
PIF_VALUE: 20
PIF_VALUE: 22
PIF_VALUE: 1
PIF_VALUE: 20
PIF_VALUE: 20
PIF_VALUE: 23
PIF_VALUE: 1
PIF_VALUE: 1
PIF_VALUE: 20
PIF_VALUE: 20
PIF_VALUE: 22
PIF_VALUE: 20
PIF_VALUE: 21
PIF_VALUE: 1
PIF_VALUE: 21
PIF_VALUE: 21
PIF_VALUE: 1
PIF_VALUE: 20
PIF_VALUE: 2
PIF_VALUE: 20
PIF_VALUE: 20
PIF_VALUE: 21
PIF_VALUE: 21
PIF_VALUE: 20
PIF_VALUE: 1
PIF_VALUE: 19
PIF_VALUE: 20
PIF_VALUE: 19
PIF_VALUE: 22
PIF_VALUE: 1
PIF_VALUE: 20
PIF_VALUE: 27
PIF_VALUE: 1
PIF_VALUE: 1
PIF_VALUE: 19
PIF_VALUE: 2
PIF_VALUE: 22
PIF_VALUE: 20
PIF_VALUE: 12
PIF_VALUE: 1
PIF_VALUE: 20
PIF_VALUE: 20
PIF_VALUE: 1
PIF_VALUE: 20
PIF_VALUE: 20
PIF_VALUE: 1
PIF_VALUE: 20
PIF_VALUE: 1
PIF_VALUE: 20
PIF_VALUE: 1
PIF_VALUE: 19
PIF_VALUE: 1
PIF_VALUE: 8
PIF_VALUE: 1
PIF_VALUE: 22
PIF_VALUE: 1
PIF_VALUE: 1
PIF_VALUE: 21
PIF_VALUE: 2
PIF_VALUE: 21
PIF_VALUE: 4
PIF_VALUE: 4
PIF_VALUE: 22
PIF_VALUE: 22
PIF_VALUE: 0
PIF_VALUE: 21
PIF_VALUE: 21
PIF_VALUE: 24
PIF_VALUE: 21
PIF_VALUE: 20
PIF_VALUE: 1
PIF_VALUE: 22
PIF_VALUE: 20
PIF_VALUE: 1
PIF_VALUE: 22
PIF_VALUE: 20
PIF_VALUE: 21
PIF_VALUE: 20
PIF_VALUE: 1
PIF_VALUE: 21
PIF_VALUE: 19
PIF_VALUE: 20
PIF_VALUE: 21
PIF_VALUE: 21
PIF_VALUE: 22
PIF_VALUE: 20
PIF_VALUE: 19
PIF_VALUE: 1
PIF_VALUE: 19
PIF_VALUE: 20
PIF_VALUE: 21
PIF_VALUE: 22
PIF_VALUE: 1
PIF_VALUE: 21
PIF_VALUE: 20
PIF_VALUE: 1
PIF_VALUE: 20
PIF_VALUE: 19
PIF_VALUE: 1
PIF_VALUE: 5
PIF_VALUE: 1
PIF_VALUE: 20
PIF_VALUE: 1
PIF_VALUE: 1
PIF_VALUE: 22
PIF_VALUE: 20
PIF_VALUE: 21
PIF_VALUE: 21
PIF_VALUE: 1
PIF_VALUE: 4
PIF_VALUE: 20
PIF_VALUE: 1
PIF_VALUE: 19
PIF_VALUE: 1
PIF_VALUE: 1
PIF_VALUE: 22
PIF_VALUE: 22
PIF_VALUE: 20
PIF_VALUE: 0
PIF_VALUE: 20
PIF_VALUE: 1
PIF_VALUE: 0
PIF_VALUE: 1
PIF_VALUE: 21
PIF_VALUE: 20
PIF_VALUE: 20
PIF_VALUE: 1
PIF_VALUE: 21
PIF_VALUE: 19
PIF_VALUE: 1
PIF_VALUE: 20
PIF_VALUE: 22
PIF_VALUE: 21
PIF_VALUE: 1
PIF_VALUE: 20
PIF_VALUE: 1
PIF_VALUE: 20
PIF_VALUE: 19
PIF_VALUE: 21
PIF_VALUE: 22
PIF_VALUE: 1
PIF_VALUE: 19
PIF_VALUE: 1
PIF_VALUE: 21
PIF_VALUE: 20
PIF_VALUE: 1
PIF_VALUE: 21
PIF_VALUE: 21
PIF_VALUE: 19
PIF_VALUE: 21
PIF_VALUE: 20
PIF_VALUE: 21
PIF_VALUE: 19
PIF_VALUE: 14
PIF_VALUE: 1
PIF_VALUE: 20
PIF_VALUE: 19
PIF_VALUE: 21
PIF_VALUE: 1
PIF_VALUE: 3
PIF_VALUE: 21
PIF_VALUE: 20
PIF_VALUE: 21
PIF_VALUE: 1
PIF_VALUE: 21
PIF_VALUE: 3
PIF_VALUE: 1
PIF_VALUE: 1
PIF_VALUE: 22
PIF_VALUE: 20
PIF_VALUE: 1
PIF_VALUE: 20
PIF_VALUE: 1
PIF_VALUE: 20
PIF_VALUE: 19
PIF_VALUE: 33
PIF_VALUE: 21
PIF_VALUE: 1
PIF_VALUE: 19
PIF_VALUE: 18
PIF_VALUE: 1
PIF_VALUE: 19
PIF_VALUE: 20
PIF_VALUE: 1
PIF_VALUE: 25
PIF_VALUE: 21
PIF_VALUE: 22
PIF_VALUE: 1
PIF_VALUE: 19
PIF_VALUE: 20
PIF_VALUE: 20
PIF_VALUE: 1
PIF_VALUE: 1
PIF_VALUE: 22
PIF_VALUE: 21
PIF_VALUE: 21
PIF_VALUE: 1
PIF_VALUE: 22
PIF_VALUE: 21
PIF_VALUE: 20
PIF_VALUE: 1
PIF_VALUE: 20
PIF_VALUE: 21
PIF_VALUE: 1
PIF_VALUE: 19
PIF_VALUE: 1
PIF_VALUE: 1
PIF_VALUE: 20
PIF_VALUE: 1
PIF_VALUE: 20
PIF_VALUE: 22
PIF_VALUE: 18
PIF_VALUE: 21
PIF_VALUE: 1
PIF_VALUE: 19
PIF_VALUE: 4
PIF_VALUE: 1

## 2021-06-28 ASSESSMENT — PAIN SCALES - WONG BAKER
WONGBAKER_NUMERICALRESPONSE: 0

## 2021-06-28 ASSESSMENT — PAIN SCALES - GENERAL
PAINLEVEL_OUTOF10: 0
PAINLEVEL_OUTOF10: 0
PAINLEVEL_OUTOF10: 5
PAINLEVEL_OUTOF10: 9
PAINLEVEL_OUTOF10: 9
PAINLEVEL_OUTOF10: 8
PAINLEVEL_OUTOF10: 7

## 2021-06-28 NOTE — PROGRESS NOTES
Patient admitted to CVU from [place] [address] and attached to ventilator and monitors. Report received from anesthesiologist.  Chest x-ray ordered. Labs drawn and sent. Assessment complete. Continue monitoring hemodynamics. Family let back to see patient. Visiting hours reviewed and all questions answered. Family aware of discharge class. Primary RN Emerson RN.     [address]

## 2021-06-28 NOTE — BRIEF OP NOTE
Brief Postoperative Note      Patient: Dinora Lugo  YOB: 1975  MRN: [id number]  Date of Procedure: 6/28/2021    Pre-Op Diagnosis:  CAD/USA, NSTEMI. IABP placed in cath lab. HTN. Tobacco abuse disorder. Dyslipidemia      Post-Op Diagnosis: same    Procedure:  Urgent CABG X 4, with pedicled LIMA to LAD, sequential Greater Saphenous VG to ramus intermedius then on to OM, separate single Greater SVG to PV br of RCA, LAAppendage obliteration with 35 mm Atricure LA clip, CPB, EVH Right Greater Saphenous vein, RANI, Epiaortic ultrasound, Doppler verification of grafts, Bilateral 5 level intercostal nerve block(Exparel), Platelet gel application. Removal Preop IABP     Surgeon(s):  Wendy Tatum MD    Assistants: Rosalino Gale SA, Austyn, SA    Anesthesia: ALEX/ MD Susu    Estimated Blood Loss (mL): 100   ml    Replacements:  none    Pump time:  139 min    Cross-clamp time:  117 min    Findings: normal ascending aorta. No MR or AI    Complications: None    Specimens:   * No specimens in log *    Implants:  Implant Name Type Inv. Item Serial No.  Lot No. LRB No. Used Action   DEVICE OCCL CLP L35MM SM FOOTPRINT 1 HND APPL SUTURELESS W/  DEVICE OCCL CLP L35MM SM FOOTPRINT 1 HND APPL SUTURELESS W/  [employer]WD [id number] N/A 1 Implanted         Drains:   Chest Tube 1 Mediastinal 32 [place] (Active)       Chest Tube 2 Left Pleural 32 [place] (Active)       Closed/Suction Drain Right Leg Bulb (Active)       Urethral Catheter Double-lumen; Latex;Straight-tip; Temperature probe 16 fr (Active)       Wendy Tatum MD  Date: 6/28/2021  Time: 1:15 PM   [id number]

## 2021-06-28 NOTE — PROGRESS NOTES
06/28/21 1522   RT Protocol   Smoking Status 1   Surgical status 3   Xray 0.0   Respiratory pattern 0   Mental Status 2   Breath sounds 0   Cough 0   Activity level 3   Oxygen Requirement 0   Indications for Bronchodilator Therapy None   Bronchodilator Assessment Score 1   Bronchial Hygiene Assessment Score 4   Volume Expansion Assessment Score 8

## 2021-06-28 NOTE — PROGRESS NOTES
Pt c/o midsternal post-op pain 9-10. -140. Morphine 4mg IV given at this time. SBP improved to 118-120. Will monitor.

## 2021-06-29 LAB
ANION GAP SERPL CALCULATED.3IONS-SCNC: 8 MMOL/L (ref 3–16)
BUN BLDV-MCNC: 14 MG/DL (ref 7–20)
CALCIUM SERPL-MCNC: 7.9 MG/DL (ref 8.3–10.6)
CHLORIDE BLD-SCNC: 104 MMOL/L (ref 99–110)
CO2: 20 MMOL/L (ref 21–32)
CREAT SERPL-MCNC: 0.9 MG/DL (ref 0.9–1.3)
GFR AFRICAN AMERICAN: >60
GFR NON-AFRICAN AMERICAN: >60
GLUCOSE BLD-MCNC: 100 MG/DL (ref 70–99)
GLUCOSE BLD-MCNC: 103 MG/DL (ref 70–99)
GLUCOSE BLD-MCNC: 104 MG/DL (ref 70–99)
GLUCOSE BLD-MCNC: 105 MG/DL (ref 70–99)
GLUCOSE BLD-MCNC: 109 MG/DL (ref 70–99)
GLUCOSE BLD-MCNC: 109 MG/DL (ref 70–99)
GLUCOSE BLD-MCNC: 112 MG/DL (ref 70–99)
GLUCOSE BLD-MCNC: 112 MG/DL (ref 70–99)
GLUCOSE BLD-MCNC: 115 MG/DL (ref 70–99)
GLUCOSE BLD-MCNC: 124 MG/DL (ref 70–99)
GLUCOSE BLD-MCNC: 124 MG/DL (ref 70–99)
GLUCOSE BLD-MCNC: 131 MG/DL (ref 70–99)
GLUCOSE BLD-MCNC: 132 MG/DL (ref 70–99)
GLUCOSE BLD-MCNC: 138 MG/DL (ref 70–99)
GLUCOSE BLD-MCNC: 145 MG/DL (ref 70–99)
HCT VFR BLD CALC: 35 % (ref 40.5–52.5)
HEMOGLOBIN: 12.2 G/DL (ref 13.5–17.5)
INR BLD: 1.26 (ref 0.86–1.14)
MAGNESIUM: 2.2 MG/DL (ref 1.8–2.4)
MCH RBC QN AUTO: 32.4 PG (ref 26–34)
MCHC RBC AUTO-ENTMCNC: 34.7 G/DL (ref 31–36)
MCV RBC AUTO: 93.4 FL (ref 80–100)
PDW BLD-RTO: 12.8 % (ref 12.4–15.4)
PERFORMED ON: ABNORMAL
PLATELET # BLD: 118 K/UL (ref 135–450)
PMV BLD AUTO: 9.6 FL (ref 5–10.5)
POC ACT LR: 287 SEC
POTASSIUM SERPL-SCNC: 4.4 MMOL/L (ref 3.5–5.1)
PROTHROMBIN TIME: 14.6 SEC (ref 10–13.2)
RBC # BLD: 3.75 M/UL (ref 4.2–5.9)
SODIUM BLD-SCNC: 132 MMOL/L (ref 136–145)
WBC # BLD: 16.1 K/UL (ref 4–11)

## 2021-06-29 PROCEDURE — 97167 OT EVAL HIGH COMPLEX 60 MIN: CPT

## 2021-06-29 PROCEDURE — 2580000003 HC RX 258: Performed by: THORACIC SURGERY (CARDIOTHORACIC VASCULAR SURGERY)

## 2021-06-29 PROCEDURE — 85027 COMPLETE CBC AUTOMATED: CPT

## 2021-06-29 PROCEDURE — 99232 SBSQ HOSP IP/OBS MODERATE 35: CPT | Performed by: NURSE PRACTITIONER

## 2021-06-29 PROCEDURE — 94761 N-INVAS EAR/PLS OXIMETRY MLT: CPT

## 2021-06-29 PROCEDURE — 2140000000 HC CCU INTERMEDIATE R&B

## 2021-06-29 PROCEDURE — 83735 ASSAY OF MAGNESIUM: CPT

## 2021-06-29 PROCEDURE — 99024 POSTOP FOLLOW-UP VISIT: CPT | Performed by: NURSE PRACTITIONER

## 2021-06-29 PROCEDURE — 6370000000 HC RX 637 (ALT 250 FOR IP): Performed by: THORACIC SURGERY (CARDIOTHORACIC VASCULAR SURGERY)

## 2021-06-29 PROCEDURE — 2500000003 HC RX 250 WO HCPCS: Performed by: THORACIC SURGERY (CARDIOTHORACIC VASCULAR SURGERY)

## 2021-06-29 PROCEDURE — 97530 THERAPEUTIC ACTIVITIES: CPT

## 2021-06-29 PROCEDURE — 2700000000 HC OXYGEN THERAPY PER DAY

## 2021-06-29 PROCEDURE — 6360000002 HC RX W HCPCS: Performed by: THORACIC SURGERY (CARDIOTHORACIC VASCULAR SURGERY)

## 2021-06-29 PROCEDURE — 80048 BASIC METABOLIC PNL TOTAL CA: CPT

## 2021-06-29 PROCEDURE — 85610 PROTHROMBIN TIME: CPT

## 2021-06-29 PROCEDURE — 97163 PT EVAL HIGH COMPLEX 45 MIN: CPT

## 2021-06-29 PROCEDURE — 97116 GAIT TRAINING THERAPY: CPT

## 2021-06-29 RX ADMIN — CEFAZOLIN 2000 MG: 10 INJECTION, POWDER, FOR SOLUTION INTRAVENOUS at 19:05

## 2021-06-29 RX ADMIN — OXYCODONE 10 MG: 5 TABLET ORAL at 16:47

## 2021-06-29 RX ADMIN — BISACODYL 5 MG: 5 TABLET, COATED ORAL at 08:35

## 2021-06-29 RX ADMIN — METOPROLOL TARTRATE 12.5 MG: 25 TABLET, FILM COATED ORAL at 20:27

## 2021-06-29 RX ADMIN — FUROSEMIDE 40 MG: 10 INJECTION, SOLUTION INTRAMUSCULAR; INTRAVENOUS at 08:38

## 2021-06-29 RX ADMIN — OXYCODONE 10 MG: 5 TABLET ORAL at 06:52

## 2021-06-29 RX ADMIN — Medication 15 ML: at 20:28

## 2021-06-29 RX ADMIN — Medication 1500 MG: at 20:28

## 2021-06-29 RX ADMIN — MAGNESIUM GLUCONATE 500 MG ORAL TABLET 400 MG: 500 TABLET ORAL at 08:34

## 2021-06-29 RX ADMIN — Medication 100 MG: at 08:34

## 2021-06-29 RX ADMIN — CEFAZOLIN 2000 MG: 10 INJECTION, POWDER, FOR SOLUTION INTRAVENOUS at 03:56

## 2021-06-29 RX ADMIN — KETOROLAC TROMETHAMINE 15 MG: 30 INJECTION, SOLUTION INTRAMUSCULAR; INTRAVENOUS at 03:55

## 2021-06-29 RX ADMIN — ACETAMINOPHEN 650 MG: 325 TABLET ORAL at 14:07

## 2021-06-29 RX ADMIN — KETOROLAC TROMETHAMINE 15 MG: 30 INJECTION, SOLUTION INTRAMUSCULAR; INTRAVENOUS at 14:09

## 2021-06-29 RX ADMIN — OXYCODONE 10 MG: 5 TABLET ORAL at 00:36

## 2021-06-29 RX ADMIN — Medication 1500 MG: at 10:25

## 2021-06-29 RX ADMIN — ACETAMINOPHEN 650 MG: 325 TABLET ORAL at 06:52

## 2021-06-29 RX ADMIN — FAMOTIDINE 20 MG: 10 INJECTION, SOLUTION INTRAVENOUS at 08:42

## 2021-06-29 RX ADMIN — POTASSIUM CHLORIDE 10 MEQ: 750 TABLET, EXTENDED RELEASE ORAL at 08:34

## 2021-06-29 RX ADMIN — MORPHINE SULFATE 4 MG: 4 INJECTION, SOLUTION INTRAMUSCULAR; INTRAVENOUS at 19:12

## 2021-06-29 RX ADMIN — FUROSEMIDE 40 MG: 10 INJECTION, SOLUTION INTRAMUSCULAR; INTRAVENOUS at 18:13

## 2021-06-29 RX ADMIN — INSULIN GLARGINE 14 UNITS: 100 INJECTION, SOLUTION SUBCUTANEOUS at 20:36

## 2021-06-29 RX ADMIN — ACETAMINOPHEN 650 MG: 325 TABLET ORAL at 20:26

## 2021-06-29 RX ADMIN — OXYCODONE 10 MG: 5 TABLET ORAL at 12:22

## 2021-06-29 RX ADMIN — MUPIROCIN: 20 OINTMENT TOPICAL at 20:28

## 2021-06-29 RX ADMIN — FONDAPARINUX SODIUM 2.5 MG: 2.5 INJECTION, SOLUTION SUBCUTANEOUS at 08:45

## 2021-06-29 RX ADMIN — NITROGLYCERIN 15 MCG/MIN: 20 INJECTION INTRAVENOUS at 00:24

## 2021-06-29 RX ADMIN — MULTIPLE VITAMINS W/ MINERALS TAB 1 TABLET: TAB at 08:34

## 2021-06-29 RX ADMIN — METOPROLOL TARTRATE 12.5 MG: 25 TABLET, FILM COATED ORAL at 08:34

## 2021-06-29 RX ADMIN — POTASSIUM CHLORIDE 10 MEQ: 750 TABLET, EXTENDED RELEASE ORAL at 17:12

## 2021-06-29 RX ADMIN — FAMOTIDINE 20 MG: 10 INJECTION, SOLUTION INTRAVENOUS at 20:33

## 2021-06-29 RX ADMIN — SODIUM CHLORIDE, PRESERVATIVE FREE 10 ML: 5 INJECTION INTRAVENOUS at 08:43

## 2021-06-29 RX ADMIN — KETOROLAC TROMETHAMINE 15 MG: 30 INJECTION, SOLUTION INTRAMUSCULAR; INTRAVENOUS at 08:38

## 2021-06-29 RX ADMIN — POTASSIUM CHLORIDE 10 MEQ: 750 TABLET, EXTENDED RELEASE ORAL at 12:22

## 2021-06-29 RX ADMIN — KETOROLAC TROMETHAMINE 15 MG: 30 INJECTION, SOLUTION INTRAMUSCULAR; INTRAVENOUS at 20:27

## 2021-06-29 RX ADMIN — ASPIRIN 81 MG: 81 TABLET, COATED ORAL at 08:34

## 2021-06-29 RX ADMIN — CEFAZOLIN 2000 MG: 10 INJECTION, POWDER, FOR SOLUTION INTRAVENOUS at 12:28

## 2021-06-29 RX ADMIN — SODIUM CHLORIDE, PRESERVATIVE FREE 10 ML: 5 INJECTION INTRAVENOUS at 20:27

## 2021-06-29 RX ADMIN — POLYETHYLENE GLYCOL 3350 17 G: 17 POWDER, FOR SOLUTION ORAL at 08:51

## 2021-06-29 RX ADMIN — ATORVASTATIN CALCIUM 80 MG: 80 TABLET, FILM COATED ORAL at 20:27

## 2021-06-29 ASSESSMENT — PAIN SCALES - GENERAL
PAINLEVEL_OUTOF10: 10
PAINLEVEL_OUTOF10: 6
PAINLEVEL_OUTOF10: 6
PAINLEVEL_OUTOF10: 8
PAINLEVEL_OUTOF10: 7
PAINLEVEL_OUTOF10: 3
PAINLEVEL_OUTOF10: 8
PAINLEVEL_OUTOF10: 7
PAINLEVEL_OUTOF10: 5
PAINLEVEL_OUTOF10: 6
PAINLEVEL_OUTOF10: 5
PAINLEVEL_OUTOF10: 7
PAINLEVEL_OUTOF10: 9
PAINLEVEL_OUTOF10: 7

## 2021-06-29 ASSESSMENT — PAIN DESCRIPTION - PROGRESSION
CLINICAL_PROGRESSION: NOT CHANGED

## 2021-06-29 ASSESSMENT — PAIN DESCRIPTION - ONSET
ONSET: ON-GOING
ONSET: ON-GOING

## 2021-06-29 ASSESSMENT — PAIN DESCRIPTION - LOCATION
LOCATION: CHEST
LOCATION: CHEST

## 2021-06-29 ASSESSMENT — PAIN SCALES - WONG BAKER
WONGBAKER_NUMERICALRESPONSE: 0

## 2021-06-29 ASSESSMENT — PAIN DESCRIPTION - FREQUENCY
FREQUENCY: INTERMITTENT
FREQUENCY: INTERMITTENT

## 2021-06-29 ASSESSMENT — ENCOUNTER SYMPTOMS
RESPIRATORY NEGATIVE: 1
GASTROINTESTINAL NEGATIVE: 1

## 2021-06-29 ASSESSMENT — PAIN DESCRIPTION - ORIENTATION
ORIENTATION: MID
ORIENTATION: MID

## 2021-06-29 ASSESSMENT — PAIN DESCRIPTION - PAIN TYPE
TYPE: ACUTE PAIN;SURGICAL PAIN
TYPE: ACUTE PAIN;SURGICAL PAIN

## 2021-06-29 ASSESSMENT — PAIN DESCRIPTION - DESCRIPTORS
DESCRIPTORS: SORE
DESCRIPTORS: SHARP

## 2021-06-29 ASSESSMENT — PAIN - FUNCTIONAL ASSESSMENT
PAIN_FUNCTIONAL_ASSESSMENT: ACTIVITIES ARE NOT PREVENTED
PAIN_FUNCTIONAL_ASSESSMENT: ACTIVITIES ARE NOT PREVENTED

## 2021-06-29 NOTE — PROGRESS NOTES
06/28/21 8430   NIV Type   $NIV $Daily Charge   NIV Started/Stopped On   Equipment Type v60   Mode Bilevel   Mask Type Full face mask   Mask Size Medium   Settings/Measurements   IPAP 12 cmH20   CPAP/EPAP 6 cmH2O   Rate Ordered 14   Resp 18   FiO2  30 %   Vt Exhaled 541 mL   Minute Volume 10.5 Liters   Mask Leak (lpm) 13 lpm   Comfort Level Good   Using Accessory Muscles No   Alarm Settings   Alarms On Y   Press Low Alarm 6 cmH2O   High Pressure Alarm 20 cmH2O   Resp Rate Low Alarm 6   High Respiratory Rate 40 br/min   Oxygen Therapy/Pulse Ox   SpO2 98 %

## 2021-06-29 NOTE — PROGRESS NOTES
Occupational Therapy   Occupational Therapy Initial Assessment/Treatment  Date: 2021   Patient Name: Nadege Bullock  MRN: [id number]     : 1975    Date of Service: 2021    Discharge Recommendations:  24 hour supervision or assist       Assessment   Performance deficits / Impairments: Decreased functional mobility ; Decreased ADL status; Decreased balance  After evaluation s/p CABG x 4 , pt found to be presenting with the above mentioned occupational performance deficits which are affecting participation in daily living skills. Pt SBA for mobility and transfers including toilet with use of 4ww. maxA for LE dressing. Pt would benefit from continued skilled occupational therapy to address ADLs, functional mobility, and safety while in acute care. Prognosis: Good  Decision Making: Medium Complexity  OT Education: Plan of Care;OT Role;Precautions; ADL Adaptive Strategies;Transfer Training;Equipment; Family Education  Patient Education: Disease specific: Pt educated on sternal precaution and able to verbally read back precautions after education. Pt verb understanding. REQUIRES OT FOLLOW UP: Yes  Activity Tolerance  Activity Tolerance: Patient Tolerated treatment well  Safety Devices  Safety Devices in place: Yes  Type of devices: Left in chair;Nurse notified;Call light within reach; Chair alarm in place;Gait belt           Patient Diagnosis(es): There were no encounter diagnoses. has a past medical history of Alcohol abuse, Back pain, Class 1 obesity in adult, Coronary artery disease involving native coronary artery of native heart with unstable angina pectoris ([address] Gastroesophageal reflux disease without esophagitis, Hypertension, and Osteoarthritis. has a past surgical history that includes Coronary artery bypass graft (N/A, 2021).            Restrictions  Restrictions/Precautions  Restrictions/Precautions: Cardiac, Weight Bearing, Surgical Protocols, Up as Tolerated  Required Braces or Orthoses?: No  Upper Extremity Weight Bearing Restrictions  Other: 5lb WB tolerance  Position Activity Restriction  Sternal Precautions: No Pushing, No Pulling, 5# Lifting Restrictions  Sternal Precautions: no pushing, pulling, lifting >5lb    Subjective   General  Chart Reviewed: Yes, Orders, Progress Notes, Labs, Imaging, History and Physical, Operative Notes  Patient assessed for rehabilitation services?: Yes  Family / Caregiver Present: Yes (spouse)  Referring Practitioner: Vivien Ochoa MD 6/28/21  Diagnosis: CAD, CABG x 4 6/28/21  Subjective  Subjective: Pt pleasant and agreeable to OT evaluation and OOB. states feels better being up than laying down.   Patient Currently in Pain: Yes  Pain Assessment  Pain Assessment: 0-10  Pain Level: 7  Patient's Stated Pain Goal: No pain  Pain Type: Acute pain;Surgical pain  Pain Location: Chest  Pain Orientation: Mid  Pain Descriptors: Sharp  Pain Frequency: Intermittent  Pain Onset: On-going  Clinical Progression: Not changed  Functional Pain Assessment: Activities are not prevented  Non-Pharmaceutical Pain Intervention(s): Rest;Repositioned  Response to Pain Intervention: None  RASS Score: Alert and calm  Vital Signs  Temp: 97.7 °F (36.5 °C)  Temp Source: Oral  Pulse: 99  Heart Rate Source: Monitor  Resp: 16  BP: 129/86  BP Location: Left upper arm  MAP (mmHg): 99  Patient Position: Sitting;Up in chair  Level of Consciousness: Alert (0)  MEWS Score: 2  Patient Currently in Pain: Yes  Oxygen Therapy  SpO2: 95 %  Pulse Oximeter Device Mode: Continuous  Pulse Oximeter Device Location: Finger  O2 Device: None (Room air)  Social/Functional History  Social/Functional History  Lives With: Spouse  Type of Home: House  Home Layout: One level  Home Access: Stairs to enter with rails  Entrance Stairs - Number of Steps: 3  Entrance Stairs - Rails: None  Bathroom Shower/Tub: Walk-in shower (lip)  Bathroom Toilet: Standard  Bathroom Equipment: Built-in shower seat  Bathroom Accessibility: Accessible  Home Equipment: Rolling walker  Receives Help From: Family  ADL Assistance: Independent  Homemaking Assistance: Independent  Homemaking Responsibilities: Yes  Ambulation Assistance: Independent  Transfer Assistance: Independent  Active : Yes  Mode of Transportation: Car  Occupation: Full time employment  Type of occupation:   Leisure & Hobbies: racing; dirt cars       Objective        Orientation  Overall Orientation Status: Within Functional Limits     Balance  Sitting Balance: Supervision  Standing Balance: Contact guard assistance    Functional Mobility  Functional - Mobility Device: 4-Wheeled Walker  Activity: To/from bathroom  Assist Level: Contact guard assistance    Toilet Transfers  Toilet - Technique: Ambulating (with 4ww)  Equipment Used: Standard toilet  Toilet Transfer: Stand by assistance     ADL  Feeding: Setup  Grooming: Setup  UE Dressing: Minimal assistance  LE Dressing: Maximum assistance  Toileting: Dependent/Total (aguayo)     Tone RUE  RUE Tone: Normotonic  Tone LUE  LUE Tone: Normotonic  Coordination  Movements Are Fluid And Coordinated: Yes     Bed mobility  Supine to Sit: Stand by assistance  Sit to Supine: Unable to assess (up to chair at end of session)  Transfers  Sit to stand: Stand by assistance (up to 4ww)  Stand to sit: Stand by assistance     Cognition  Overall Cognitive Status: WFL        Sensation  Overall Sensation Status: WNL        LUE AROM (degrees)  LUE AROM : WFL  RUE AROM (degrees)  RUE AROM : WFL  LUE Strength  Gross LUE Strength: WFL  RUE Strength  Gross RUE Strength: WFL      Plan   Plan  Times per week: 5-6x's a week while in ICU           AM-PAC Score        AM-PAC Inpatient Daily Activity Raw Score: 15 (06/29/21 1318)  AM-PAC Inpatient ADL T-Scale Score : 34.69 (06/29/21 1318)  ADL Inpatient CMS 0-100% Score: 56.46 (06/29/21 1318)  ADL Inpatient CMS G-Code Modifier : CK (06/29/21 1318)    Goals  Short term goals  Time Frame for Short term goals: 1 week unless otherwise specified 7/6  Short term goal 1: Pt will complete LE dressing with CGA by 7/4  Short term goal 2: Pt will complete toilet transfers with supervision w/o use of 4ww  Short term goal 3: Pt will complete standing level ADLs with SBA for balance  Patient Goals   Patient goals : \"to be able to get back to work\"       Therapy Time   Individual Concurrent Group Co-treatment   Time In 0924         Time Out 0953         Minutes 29         Timed Code Treatment Minutes: 19 Minutes       Garrett Miller OTR/L  If pt is unable to be seen after this session, please let this note serve as discharge summary. Please see case management note for discharge disposition. Thank you.

## 2021-06-29 NOTE — OP NOTE
[facility] [address], [place] [address]                                OPERATIVE REPORT    PATIENT NAME: Jono Whitley                       :        1975  MED REC NO:   [id number]                          ROOM:       0226  ACCOUNT NO:   [de-identified]                           ADMIT DATE: 2021  PROVIDER:     Veena Parker MD    DATE OF PROCEDURE:  2021    PREOPERATIVE DIAGNOSES:  1. Coronary artery disease. 2.  Unstable angina. 3.  Non-ST-segment elevation MI.  4.  Intraaortic balloon placed in the cath lab. 5.  Hypertension. 6.  Tobacco abuse disorder. 7.  Dyslipidemia. 8.  Class I obesity. POSTOPERATIVE DIAGNOSES:  1. Coronary artery disease. 2.  Unstable angina. 3.  Non-ST-segment elevation MI.  4.  Intraaortic balloon placed in the cath lab. 5.  Hypertension. 6.  Tobacco abuse disorder. 7.  Dyslipidemia. 8.  Class I obesity. OPERATION PERFORMED:  1. Urgent coronary bypass grafting surgery x4 with sequential greater  saphenous vein graft to the ramus intermediate branch onto the obtuse  marginal branch of circumflex, separate single greater saphenous vein  graft to the posterior ventricular branch of the right coronary artery,  pedicled left internal artery to the LAD. 2.  Left atrial appendage obliteration with 35-mm AtriCure left atrial  clip. 3.  Cardiopulmonary bypass. 4.  Endoscopic vein harvest of right greater saphenous vein. 5.  Transesophageal echo. 6.  Epiaortic ultrasound. 7.  Doppler verification of grafts. 8.  Bilateral five-level intercostal nerve block with Exparel. 9.  Platelet gel application. 10.  Removal of preoperatively placed intraaortic balloon pump. SURGEON:  Veena Crowley. Sean Parker MD    ASSISTANTS:  Teresa Denis SA; Dc Watson SA; Darshan Kong  [place]    ANESTHESIA:  General endotracheal anesthesia per Dr. Marquita Flanagan.     INDICATIONS AND CONSENT:  The patient is a 44-year-old gentleman with a  history of hypertension, dyslipidemia. He developed a substernal chest  pressure and diaphoresis. 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 He was brought to the  hospital, found to have EKG and enzyme changes consistent with  non-ST-segment elevation MI. He was taken to the cardiac Cath Lab in the results of his left heart catheterization showed multivessel coronary  artery disease. His culprit vessel appeared to be a circumflex. He  underwent a balloon angioplasty without stent placement with restoration  of flow to that vessel. At the completion of the catheterization and intra-aortic balloon pump was placed by cardiology. Upon reviewing his catheterization, he had  multivessel proximal significant coronary artery disease not amenable to  further PCI. He was not loaded with Brilinta, but started on Integrilin  and Cardiac Surgery consulted. Dr. Kieran Roth saw the patient in  consultation, examined the patient, reviewed his imaging studies, and  recommended coronary bypass grafting surgery. He discussed the case  with me. I agreed to proceed with surgical intervention on Monday  morning. I reviewed the imaging studies. I discussed the surgery with  the patient and his wife. Discussed risks, benefits and alternatives  including risks of infection, bleeding, stroke, MI, arrhythmias, and  operative mortality risk. Questions were answered and consent was  obtained. He had no chest pain at the time of our discussion. He was  hemodynamically stable. INTRAOPERATIVE FINDINGS:  Left ventricular function was well preserved. He came off bypass without inotropic support. Preprocedural RANI placed  by Anesthesia also reviewed by myself showed preprocedural ejection  fraction of about 50%-55%. He had really no segmental wall motion  abnormalities.   Had trivial mitral regurg, no aortic insufficiency, no  interatrial septal defect, no clot in the left atrial appendage. Postprocedural RANI showed ejection fraction of about 60%. He had no  residual mitral regurg, no aortic insufficiency, no other structural  changes in his heart, and he had no segmental wall motion abnormalities. Epiaortic ultrasound, which I independently performed and interpreted  based on surgical plans on showed no atherosclerotic disease of the  ascending aorta. Vein graft utilized was of very good quality, uniform  in diameter throughout its length. PHANI had excellent pulsatile flow, no  calcific wall disease. His coronary arteries all showed diffuse  calcific disease much beyond what would be expected in a 60-year-old  male. Pump time was 139 minutes. Cross-clamp time 117 minutes. Coldest core  temperature was 34.5 degrees centigrade. Ancef and vancomycin both  administered prior to incision. Oral Tylenol had been administered. Amicar given. OPERATIVE PROCEDURE:  The patient was brought to the operating room,  placed on the operating table in supine position. The intraaortic balloon  pump that was previously placed through the right femoral artery approach, was  left 1:1 counterpulsation. In the operating room, general  endotracheal anesthesia accomplished, Dempsey catheter placed. A right  internal jugular triple-lumen central venous line was placed after  placement of right brachial arterial monitoring line. The chest,  abdomen, groin, and legs were all prepped with DuraPrep and draped in  the usual sterile fashion for open heart surgery. Time-out process was  carried out appropriately identifying the patient and the procedure. The heart was exposed through median sternotomy incision while a portion  of right greater saphenous vein was harvested using endoscopic vein  harvest technique. This was accomplished successfully. A Mark-Rodarte  drain placed and the leg closed.   Simultaneously, the left internal  mammary artery was taken down from its position underneath the sternum  with electrocautery. Systemic heparin was administered. Distal end of  the PHANI clipped with surgical clips, divided, stump oversewn with  Ethibond suture and the pedicle placed in the left upper chest until  needed for bypass. The pericardium was opened longitudinally, tacked up laterally creating  a pericardial cradle. The ascending aorta was inspected with epiaortic  ultrasound, which I independently performed and interpreted based on  surgical plans on. I see no atherosclerotic disease. The ascending  aorta was cannulated for arterial return from the pump. Retrograde  cardioplegia line was placed transatrially and a three-stage single  venous cannula placed through the right atrial appendage into the  inferior vena cava and the patient was placed on bypass. The intra-aortic balloon pump was placed on standby status. A needle vent  placed in the ascending aorta and secured with pledgeted pursestring. The method of myocardial protection employed was cold blood  cardioplegia, given antegrade to achieve cardiac standstill for  retrograde cardioplegia. In a 15 to 20-minute intervals, the retrograde  cardioplegia administered along with vein graft cardioplegia as the  veins were completed. Antegrade cardioplegia was administered to  distend the aorta for vein graft measurements. A hotshot dose of  cardioplegia was administered, 500 mL over three minutes, half  retrograde, the rest antegrade. Cross-clamp applied, cardioplegia administered, rapid cardiac standstill  achieved. First grafting done was the obtuse marginal branch of  circumflex. Coronary and vein were prepared and two vessels joined  end-to-side with 7-0 Prolene suture in a running fashion. The  anastomosis was checked for hemostasis and found to be satisfactory by  giving retrograde and vein graft cardioplegia.     A 35-mm AtriCure ACHV clip was applied to the base of the left atrial  appendage successfully. The vein was brought in a sequential fashion to the ramus intermediate,  which was dissected and opened longitudinally with the same vein  prepared. The vein was opened longitudinally and appropriately put  along the length and a side-to-side transverse anastomosis was completed  with 7-0 Prolene suture in a running fashion. The anastomosis was  checked for hemostasis by giving retrograde and vein graft cardioplegia. The vein was already restored to its position in the pericardium. Antegrade cardioplegia administered to distend the aorta for vein graft  measurement. The vein was trimmed for length. The aortotomy was  created with a 4.4-mm aortic punch and the proximal anastomosis was  completed with a 6-0 Prolene suture in a running fashion. Again,  retrograde cardioplegia administered. Third anastomosis was the posterior ventricular branch of the right  coronary artery. The PV branch was the largest of three to four  branches on the bottom of the heart that overall communicated with one  another with no significant intervening stenosis. I took the largest  vessel that appeared to have the least disease. Separate vein were  prepared and two vessels joined end-to-side with 7-0 Prolene suture in a  running fashion. The anastomosis was checked for hemostasis and found  to be satisfactory by giving retrograde and vein graft cardioplegia. PHANI was brought to the field through a pericardial window behind the  left lateral thymic fat pad. The LAD was dissected and opened  longitudinally. PHANI prepared and two vessels joined end to-side with  7-0 Prolene suture in a running fashion. Bulldog removed from the  pedicle, checked the anastomosis for hemostasis, found to be  satisfactory and bulldog reapplied. Side branches were clipped and the  pedicle was secured to the myocardium with 5-0 Prolene suture to prevent  twisting and torquing at the anastomotic site.   During this timeframe,  the patient was systematically rewarmed. Antegrade cardioplegia was once again administered. The vein graft  coming from the right coronary was trimmed for appropriate length. The  aortotomy was created with a 4.4-mm aortic punch. The proximal  anastomosis was completed with a 6-0 Prolene suture in a running  fashion. The patient was placed in Trendelenburg position. A hotshot  dose of cardioplegia administered, 500 mL over three minutes, half  retrograde, the rest antegrade, and cross-clamp removed. Returned to a  ventricular tachycardia rhythm. He is cardioverted x1 with 10 joules of  direct cardioversion back to sinus rhythm. ST segments were all  normalized. Retrograde cardioplegia line was removed. The pursestring suture  securing it was tied and reinforced with 3-0 Prolene suture in a  figure-of-eight fashion. Temporary epicardial pacing wires were placed,  two on the surface of the right ventricle, two at the anterior groove,  brought out through the skin and secured with Ethibond suture. No  pacing required at this time. At this point with the patient fully rewarmed and ventilated. The intra-aortic balloon pump was placed back on 1:1 counterpulsation. He was   from bypass and this was accomplished initially with low dose  dobutamine, but that was weaned off. The intra-aortic balloon pump was then turned to 1:3 counterpulsation and with that he remained stable, so the venous  cannula was removed and the pursestring suture securing it was tied and  reinforced with 3-0 Prolene suture in a figure-of-eight fashion. The  needle vent was removed from the ascending aorta and the pursestring  suture securing it was tied. The heparin effect was then reversed with  protamine, returning ACT towards baseline.   As I did that, the aortic  cannula was removed and the pursestring suture securing it was tied x2  and reinforced with pledgeted 3-0 Prolene suture placed in a horizontal  mattress fashion. At this point, we continued to work on hemostasis until it was found to  be satisfactory for closure. Doppler was used to check each of the  grafts, all found to have satisfactory systolic and diastolic signals. The pericardium was reapproximated over the ascending aorta as well as  over the base of the diaphragm. Platelet-poor gel was placed inside the  pericardium and platelet-rich gel between the sternum as it was closed. Two 32-Al[place]ian chest tubes were placed, one in the left pleural cavity,  the other in the anterior pericardium overlying the right ventricle and  the aorta, both secured with Ethibond suture and connected to suction  drainage device. The sternum was reapproximated with stainless steel  wires. The intercostal spaces were infiltrated with total of 80 mL of  standard Exparel solution. This was given after taking down the PHANI bed  prior to opening the pericardium. The sternal wound was irrigated with  copious amounts of warm saline and closed in appropriate layers with  Vicryl suture including subcuticular stitch in the skin. As the sternum was being closed the intra-aortic balloon pump was removed from the right femoral artery and pressure held until hemostasis was satisfactory. A Fem-Stop was then placed for transport to the ICU. ESTIMATED BLOOD LOSS:  100 mL. REPLACEMENTS:  None. COUNTS:  Sponge and needle counts were correct x2. DONALD C. Randene Najjar, MD    D: 06/28/2021 15:22:27       T: 06/28/2021 22:47:08     [email or web address]  Job#: [id number]     Doc#: [id number]    CC:  Colleen Ya, MANI Sigala.  MD Eda Webber MD

## 2021-06-29 NOTE — PROGRESS NOTES
Patient met criteria per open heart protocol to transition to stepdown level of care.  Mauro Cerna RN

## 2021-06-29 NOTE — CARE COORDINATION
CASE MANAGEMENT INITIAL ASSESSMENT      Reviewed chart and completed assessment via telephone with:pt and spouse at bedside  Explained Case Management role/services. yes    Primary contact information:below    Health Care Decision Maker :   Primary Decision Maker: Arabella Bahena - Spouse - [phone]          Can this person be reached and be able to respond quickly, such as within a few minutes or hours? Yes  Who would be your back-up decision maker? Name None named  Phone Number:    Admit date/status:6/26/2021  Sun 84  Is this a Readmission?:  No      Insurance:None   Precert required for SNF: No       3 night stay required: No    Living arrangements, Adls, care needs, prior to admission:Lives w spouse- IPTA- drives    Transportation:Drives    Durable Medical Equipment at home:  Walker__Cane__RTS__ BSC__Shower Chair__  02__ HHN__ CPAP__  BiPap__  Hospital Bed__ W/C___ Other____None______    Services in the home and/or outpatient, prior to admission:None    ·     PT/OT recs:Pending    Hospital Exemption Notification (HEN):na    Barriers to discharge:no insurance    Plan/comments:Plans home w spouse and HHC- will search for [facility] that can accept without insurance and continue to follow. Tentative referral to [facility].      [facility] on chart for MD norma Ji RN

## 2021-06-29 NOTE — PROGRESS NOTES
[address]  Cardiology  Progress Note    Admission date:  2021    Reason for follow up visit: STEMI/CAD    HPI/CC: Bridger Townsend is a 41 y.o. male who was admitted 2021 from [address] for chest pain and found to have inferolateral STEMI. LHC showed significant LCx treated with PTA, also had multi vessel disease, IABP and CT surgery consult. Echo showed EF 45-50%. On 2021 he had CABG x4 and MERRILL clip. Rhythm has been sinus. Subjective: Post op day 1. Feels ok, no chest pain or shortness of breath. Vitals:  Blood pressure 129/86, pulse 102, temperature 97.7 °F (36.5 °C), temperature source Oral, resp. rate 16, height 5' 9\" (1.753 m), weight 217 lb 9.5 oz (98.7 kg), SpO2 94 %.   Temp  Av °F (37.2 °C)  Min: 97.7 °F (36.5 °C)  Max: 100.1 °F (37.8 °C)  Pulse  Av.4  Min: 78  Max: 106  BP  Min: 114/85  Max: 139/70  SpO2  Av %  Min: 93 %  Max: 100 %  FiO2   Av.8 %  Min: 30 %  Max: 100 %    24 hour I/O    Intake/Output Summary (Last 24 hours) at 2021 1220  Last data filed at 2021 1200  Gross per 24 hour   Intake 5208.25 ml   Output 3295 ml   Net 1913.25 ml     Current Facility-Administered Medications   Medication Dose Route Frequency Provider Last Rate Last Admin    sodium chloride flush 0.9 % injection 10 mL  10 mL Intravenous 2 times per day Rubia Hernandez MD   10 mL at 21 0843    sodium chloride flush 0.9 % injection 10 mL  10 mL Intravenous PRN Rubia Hernandez MD        0.9 % sodium chloride infusion  25 mL Intravenous PRN Rubia Hernandez MD        fondaparinux (ARIXTRA) injection 2.5 mg  2.5 mg Subcutaneous Daily Rubia Hernandez MD   2.5 mg at 21 0845    ondansetron (ZOFRAN) injection 4 mg  4 mg Intravenous Q8H PRN Rubia Hernandez MD        aspirin EC tablet 81 mg  81 mg Oral Daily Rubia Hernandez MD   81 mg at 21 0834    acetaminophen (TYLENOL) tablet 650 mg  650 mg Oral Q6H Rubia Hernandez MD   650 mg at 21 5607  oxyCODONE (ROXICODONE) immediate release tablet 5 mg  5 mg Oral Q4H PRN Hung Robins MD        Or    oxyCODONE (ROXICODONE) immediate release tablet 10 mg  10 mg Oral Q4H PRN Hung Robins MD   10 mg at 06/29/21 0228    ketorolac (TORADOL) injection 15 mg  15 mg Intravenous Q6H Hung Robins MD   15 mg at 06/29/21 0466    morphine (PF) injection 2 mg  2 mg Intravenous Q2H PRN Hung Robins MD        Or    morphine (PF) injection 4 mg  4 mg Intravenous Q2H PRN Hung Robnis MD   4 mg at 06/28/21 2323    chlorhexidine (PERIDEX) 0.12 % solution 15 mL  15 mL Mouth/Throat BID Hung Robins MD   15 mL at 06/28/21 2027    furosemide (LASIX) injection 40 mg  40 mg Intravenous BID Hung Robins MD   40 mg at 06/29/21 8719    magnesium oxide (MAG-OX) tablet 400 mg  400 mg Oral Daily Hung Robins MD   400 mg at 06/29/21 3311    mupirocin (BACTROBAN) 2 % ointment   Nasal BID Hung Robins MD   Given at 06/28/21 2027    nitroGLYCERIN (NITRODUR) 0.2 MG/HR 1 patch  1 patch Transdermal Daily Hung Robins MD        potassium chloride (KLOR-CON M) extended release tablet 10 mEq  10 mEq Oral TID  Hung Robins MD   10 mEq at 06/29/21 0834    midazolam (VERSED) injection 1 mg  1 mg Intravenous Q1H PRN Hung Robins MD        diphenhydrAMINE (BENADRYL) tablet 25 mg  25 mg Oral Nightly PRN Hung Robins MD        polyethylene glycol [facility]) packet 17 g  17 g Oral Daily Hung Robins MD   17 g at 06/29/21 0851    bisacodyl (DULCOLAX) EC tablet 5 mg  5 mg Oral Daily Hung Robins MD   5 mg at 06/29/21 1851    bisacodyl (DULCOLAX) suppository 10 mg  10 mg Rectal Daily PRN Hung Robins MD        metoprolol tartrate (LOPRESSOR) tablet 12.5 mg  12.5 mg Oral BID Hung Robins MD   12.5 mg at 06/29/21 0834    atorvastatin (LIPITOR) tablet 80 mg  80 mg Oral Nightly Hung Robins MD        famotidine (PEPCID) injection 20 mg  20 mg Intravenous BID Javier Sanchez MD   20 mg at 06/29/21 0746    ceFAZolin (ANCEF) 2000 mg in dextrose 5 % 100 mL IVPB  2,000 mg Intravenous Q8H Javier Sanchez MD   Stopped at 06/29/21 0426    vancomycin 1500 mg in dextrose 5% 300 mL IVPB  1,500 mg Intravenous Q12H Javier Sanchez  mL/hr at 06/29/21 1025 1,500 mg at 06/29/21 1025    potassium chloride 20 mEq/50 mL IVPB (Central Line)  20 mEq Intravenous PRN Javier Sanchez MD        magnesium sulfate 2000 mg in 50 mL IVPB premix  2,000 mg Intravenous PRN Javier Sanchez MD        calcium chloride 1,000 mg in sodium chloride 0.9 % 100 mL IVPB  1,000 mg Intravenous PRN Javier Sanchez MD        calcium chloride 2,000 mg in sodium chloride 0.9 % 100 mL IVPB  2,000 mg Intravenous PRN Javier Sanchez MD        albuterol sulfate  (90 Base) MCG/ACT inhaler 2 puff  2 puff Inhalation Q6H PRN Javier Sanchez MD        albumin human 5 % IV solution 25 g  25 g Intravenous PRN Javier Sanchez MD        lactated ringers bolus  250 mL Intravenous Continuous PRN Javier Sanchez MD        DOBUTamine (DOBUTREX) 500 mg in dextrose 5 % 250 mL infusion  2 mcg/kg/min Intravenous Continuous PRN Javier Sanchez MD   Stopped at 06/28/21 1800    phenylephrine (TETO-SYNEPHRINE) 50 mg in dextrose 5 % 250 mL infusion  10 mcg/min Intravenous Continuous PRN Javier Sanchez MD        [facility]) 20 mg in dextrose 5 % 100 mL infusion  0.375 mcg/kg/min Intravenous Continuous Javier Sanchez MD        norepinephrine (LEVOPHED) 16 mg in dextrose 5 % 250 mL infusion  2 mcg/min Intravenous Continuous PRN Javier Sanchez MD        nitroGLYCERIN 50 mg in dextrose 5% 250 mL infusion  10 mcg/min Intravenous Continuous PRN Javier Sanchez MD   Stopped at 06/29/21 0930    insulin regular (HUMULIN R;NOVOLIN R) 100 Units in sodium chloride 0.9 % 100 mL infusion  1 Units/hr Intravenous Continuous Javier Sanchez MD 2.6 mL/hr at 06/29/21 1132 2.56 Units/hr at 06/29/21 1132    insulin glargine (LANTUS) injection vial 14 Units  0.15 Units/kg Subcutaneous Nightly Lisa Marquez MD        [START ON 6/30/2021] insulin lispro (HUMALOG) injection vial 0-6 Units  0-6 Units Subcutaneous TID WC Lisa Marquez MD        Mckenzie Victorino ON 6/30/2021] insulin lispro (HUMALOG) injection vial 0-3 Units  0-3 Units Subcutaneous Nightly Lisa Marquez MD        glucose (GLUTOSE) 40 % oral gel 15 g  15 g Oral PRN Lisa Marquez MD        dextrose 50 % IV solution  12.5 g Intravenous PRN Lisa Marquez MD        glucagon (rDNA) injection 1 mg  1 mg Intramuscular PRN Lisa Marquez MD        dextrose 5 % solution  100 mL/hr Intravenous PRN Lisa Marquez MD        thiamine tablet 100 mg  100 mg Oral Daily Lisa Marquez MD   100 mg at 06/29/21 6672    therapeutic multivitamin-minerals 1 tablet  1 tablet Oral Daily Lisa Marquez MD   1 tablet at 06/29/21 1974     Review of Systems   Constitutional: Negative. Respiratory: Negative. Cardiovascular: Negative. Gastrointestinal: Negative. Neurological: Negative.       Objective:     Telemetry monitor:     Physical Exam:  Constitutional:  Comfortable and alert, NAD, appears stated age  Eyes: PERRL, sclera nonicteric  Neck:  Supple, no masses, no thyroidmegaly, no JVD  Skin:  Warm and dry; no rash or lesions; +sternal incision  Heart: Regular, normal apex, S1 and S2 normal, no M/G/R  Lungs:  Normal respiratory effort; clear; no wheezing/rhonchi/rales  Abdomen: soft, non tender, + bowel sounds  Extremities:  No edema or cyanosis; no clubbing  Neuro: alert and oriented, moves legs and arms equally, normal mood and affect    Data Reviewed:    CABG 6/28/2021:  Urgent CABG X 4, with pedicled LIMA to LAD, sequential Greater Saphenous VG to ramus intermedius then on to OM, separate single Greater SVG to PV br of RCA, LAAppendage obliteration with 35 mm Atricure LA clip, CPB, EVH Right Greater Saphenous vein, RANI, Epiaortic ultrasound, Doppler verification of grafts, Bilateral 5 level intercostal nerve block(Exparel), Platelet gel application. Removal Preop IABP     Echo 6/26/2021:  The left ventricular systolic function is mildly reduced with an ejection   fraction of 45 -50 %. There is hypokinesis of the inferolateral and anterolateral walls. Mild concentric left ventricular hypertrophy. Left ventricular diastolic filling pressure is normal.   Mild mitral regurgitation. Coronary angiogram 6/26/2021:  Acute inferolateral STEMI  Left heart catheterization  LVgram  Coronary angiogam  Coronary cath  Monitoring of moderate conscious sedation  Intra-aortic balloon pump placement  PTA of LCx  PROCEDURE DESCRIPTION    This was felt to be an emergency procedure. Patient was prepped draped in the usual sterile fashion. Local anaesthetic was applied over puncture site. Using a back wall technique, a 6 Swedish Terumo sheath was inserted into right radial artery. Verapamil, nitroglycerin, nicardipine were administered through the sheath. Heparin was administered. Diagnostic 5 Hungarian pigtail, ultra, bellamy catheters were used for diagnostic angiograms. Pigtail was used for left ventricular angiogram, ultra was used for RCA angiography, [place] Friend was used for left-sided coronary geography. Attention then turned towards PCI as noted below. Immediately after PCI, using fluoroscopic and ultrasound guidance, as well as a micropuncture kit, a 6 Western Vanessa sheath was inserted into the right common femoral artery and this was ultimately upsized to an 8 Western Vanessa sheath for intra-aortic balloon pump placement which was advanced just into the thoracic descending aorta. Balloon was functioning appropriately good augmentation pressures noted. At the conclusion of the procedure, a TR band was placed over the puncture site and hemostasis was obtained. There were no immediate complications.  I supervised sedation with versed 4 mg/fentanyl 200 mcg during the procedure. 210 cc contrast was utilized. <20cc EBL. FINDINGS     LVEDP  9   GRADIENT ACROSS AORTIC VALVE  none   LV FUNCTION EF 60%   WALL MOTION  normal   MITRAL REGURGITATION  mild     LM Long, large vessel, proximal-mid less than 10% stenosis, distal vessel has tapering with 50 to 60% stenosis.       LAD  Ostial/proximal 80% stenosis. Mid-distal 60% stenosis.     D1 is a small to medium size vessel with proximal-mid 75% stenosis.       LCX Markedly tortuous vessel particularly at ostium and in the proximal segments, there is 50 to 60% stenosis followed by mid-distal 99% stenosis with LAILA II flow.         RI  This vessel could also be described as a high OM1 with 40-50 stent proximal-mid stenosis.       RCA Dominant cough tortuous, calcified, proximal 50% stenosis, mid 50% stenosis, distal 90% stenosis. PERCUTANEOUS INTERVENTION DESCRIPTION    Heparin was used for anticoagulation, patient had already been preloaded with Brilinta. Integrilin was given during the procedure and was ordered for continuous infusion as patient may ultimately require CABG.     Initially a 6 English mL 3.5 guiding catheter was taken and this was used to intubate the left main. A choice floppy wire was initially taken however due to severe circumflex tortuosity, there was difficulty with wiring the circumflex lesion which was felt to be the culprit for patient's current presentation. As such a DoublePositive dual lumen microcatheter was taken and the choice floppy wire as well as a Medtronic cougar wire were taken through this. The choice floppy wire was advanced with the rapid exchange port and the Medtronic cougar was advanced through the proximal port. With these wires in place including with the choice wire in the high OM/ramus artery, the Medtronic cougar wire was able to be manipulated into the circumflex and across the culprit lesion.   Then the choice wire was removed and using a Trapper balloon, the microcatheter was also able to be removed. With a [employer] cougar wire in place, a 2.5 mm balloon was able to be utilized to perform angioplasty of the circumflex. With that there was improvement in flow from LAILA II to LAILA-3. There is 20% residual stenosis. Patient's EKG improved. There is no further chest pain. Residual CAD/ASHD was felt to be best treated with CABG and CT surgery consultation has been placed.   Findings/plans were discussed with patient and wife, they understand the gravity situation that there is a high risk of deterioration and complications which include with extensive CAD. They understand and wish to proceed with plans as outlined. CONCLUSIONS:    Successful PTA of circumflex  Successful intra-aortic balloon pump placement   Multivessel CAD/ASHD  Refer to CT surgery for consideration of CABG  If not felt to be a candidate for CABG, can consider high risk multivessel PCI.     Lab Reviewed:     Renal Profile:  Lab Results   Component Value Date    CREATININE 0.9 06/29/2021    BUN 14 06/29/2021     06/29/2021    K 4.4 06/29/2021     06/29/2021    CO2 20 06/29/2021     CBC:    Lab Results   Component Value Date    WBC 16.1 06/29/2021    RBC 3.75 06/29/2021    HGB 12.2 06/29/2021    HCT 35.0 06/29/2021    MCV 93.4 06/29/2021    RDW 12.8 06/29/2021     06/29/2021     BNP:  No results found for: PROBNP  Fasting Lipid Panel:  No results found for: CHOL, HDL, TRIG  Cardiac Enzymes:  CK/MbTroponin  Lab Results   Component Value Date    TROPONINI 0.06 06/26/2021     PT/ INR   Lab Results   Component Value Date    INR 1.26 06/29/2021    INR 1.28 06/28/2021    INR 1.06 06/28/2021    PROTIME 14.6 06/29/2021    PROTIME 14.9 06/28/2021    PROTIME 12.3 06/28/2021     PTT No results found for: PTT   Lab Results   Component Value Date    MG 2.20 06/29/2021    No results found for: TSH    All labs and imaging reviewed today    Assessment:  STEMI/CAD: s/p CABG x4 and MERRILL clip 6/28/2021; s/p PTA LCx and IABP placed 6/26/2021  Ischemic cardiomyopathy: EF 45-50%  HTN: stable  HLD  Tobacco abuse: counseled    Plan:   1. Diuresis  2. Aspirin, statin, beta blocker  3. Plavix when able  4.  ACE/ARB as tolerated for cardiomyopathy    Tu Velasquez, APRN-CNP  [address]  [phone]

## 2021-06-29 NOTE — PROGRESS NOTES
CVTS Cardiothoracic Progress Note:                                CC:  Post op follow up     Surgery: 6/28/21 Urgent coronary bypass grafting surgery x4 with sequential greater saphenous vein graft to the ramus intermediate branch onto the obtuse marginal branch of circumflex, separate single greater saphenous vein graft to the posterior ventricular branch of the right coronary artery, pedicled left internal artery to the LAD. Left atrial appendage obliteration with 35-mm AtriCure left atrial clip. Cardiopulmonary bypass. Endoscopic vein harvest of right greater saphenous vein. Transesophageal echo. Epiaortic ultrasound. Doppler verification of grafts. Bilateral five-level intercostal nerve block with Exparel. Platelet gel application. Removal of preoperatively placed intraaortic balloon pump. Hospital course:   6/29 Up in chair, easily conversational, in NAD. Remains in SR.      Subjective:   Dietary Intake: improving   no Nausea   Pain Control: controlled  Complaints: mild post op pain  Bowels: have not moved    Past Medical History:   Diagnosis Date    Alcohol abuse 6/26/2021    Back pain     Class 1 obesity in adult 6/26/2021    Coronary artery disease involving native coronary artery of native heart with unstable angina pectoris (HCC)     Gastroesophageal reflux disease without esophagitis 12/18/2015    Hypertension     Osteoarthritis         Past Surgical History:   Procedure Laterality Date    CORONARY ARTERY BYPASS GRAFT N/A 6/28/2021    CABG CORONARY ARTERY BYPASS GRAFT X4, INTERNAL MAMMARY ARTERY, SAPHENOUS VEIN GRAFT AND LEFT ATRIAL APPENDAGE CLIP, ON PUMP WITH BILATERAL 5 LEVEL INTERCOSTAL NERVE BLOCK, PLATELET GEL APPLICATION, AND REMOVAL OF INTRA  AORTIC BALLOON PUMP performed by Lenor Cabot, MD at [facility] as of 06/26/2021 - Fully Reviewed 06/26/2021   Allergen Reaction Noted    Penicillins Other (See Comments) 11/20/2012        Patient Active Problem List Diagnosis    Thoracic back pain    Tobacco use    Mechanical back pain    Gastroesophageal reflux disease without esophagitis    Snoring    Shortness of breath    Chondromalacia patellae of left knee    Left knee pain    ST elevation myocardial infarction (STEMI) (HCC)    STEMI (ST elevation myocardial infarction) ([email or web address] 75.)    Essential hypertension    Alcohol abuse    Class 1 obesity in adult    Observed sleep apnea    Ischemic cardiomyopathy    Elevated LFTs    Coronary artery disease involving native coronary artery of native heart with unstable angina pectoris (HCC)        Vital Signs: /86   Pulse 106   Temp 100.1 °F (37.8 °C) (Bladder)   Resp 17   Ht 5' 9\" (1.753 m)   Wt 217 lb 9.5 oz (98.7 kg)   SpO2 95%   BMI 32.13 kg/m²  O2 Flow Rate (L/min): 1 L/min     Admission Weight: Weight: 220 lb 10.9 oz (100.1 kg)    Weight on 6/28 (94.2 kg) Pre-op   Weight on 6/29 (98.7 kg) + 4.5 kg    Intake/Output:     Intake/Output Summary (Last 24 hours) at 6/29/2021 0950  Last data filed at 6/29/2021 0901  Gross per 24 hour   Intake 5208.25 ml   Output 2760 ml   Net 2448.25 ml      GTTS: nitro and insulin   [place]entry Trac: CVP 8, CO 6.1, CI 2.9, SVR 1200    Extubation Time: 6/28 at 13:13  Transition Time: 6/29 at 08:54     LABORATORY DATA:   CBC:   Recent Labs     06/28/21  0330 06/28/21  0733 06/28/21  1342 06/28/21  1456 06/29/21  0406   WBC 11.2*  --   --  24.0* 16.1*   HGB 14.4   < > 12.0* 13.3* 12.2*   HCT 41.5  --   --  37.5* 35.0*   MCV 93.0  --   --  93.0 93.4     --   --  103* 118*    < > = values in this interval not displayed.      BMP:   Recent Labs     06/27/21  0451 06/27/21  0451 06/28/21  0330 06/28/21  0330 06/28/21  1504 06/28/21  1853 06/29/21  0406      < > 137  --  134*  --  132*   K 4.3   < > 4.2   < > 4.7 4.4 4.4      < > 102  --  106  --  104   CO2 24   < > 27  --  21  --  20*   PHOS 3.1  --  4.0  --   --   --   --    BUN 12   < > 14  --  13  --  14   CREATININE 0.8*   < > 1.0  --  1.1  --  0.9    < > = values in this interval not displayed. MG:    Recent Labs     06/28/21  1504 06/28/21  1853 06/29/21  0406   MG 3.50* 2.40 2.20      PT/INR:   Recent Labs     06/28/21  0330 06/28/21  1459 06/29/21  0406   PROTIME 12.3 14.9* 14.6*   INR 1.06 1.28* 1.26*     APTT:   Recent Labs     06/28/21  1459   APTT 30.7     CXR: 6/28/21   Impression   Status post median sternotomy.  No pneumothorax     __________________________________________________________    Objective:   General appearance: awake, A&O, in NAD  Lungs: CTAB  Heart: S1S2 normal; SR on monitor, HR in the 90's  Chest: symmetrical expansion with inspiration and expirations; no rocking of sternum noted   Abdomen: soft, non-tender  Bowel sounds: normoactive  Kidneys: -545-425= 1955 ml in 24 hrs; Cr 0.9  Wound/Incisions: Midsternal incision with dressing CDI; RLE incisions with dressings CDI; Pacing wires taped and secured  Extremities: BLE pulses palpable; minimal tibial swelling noted   Neurological: intact, non focal exam  Chest tubes/Drains: Chest tube # 1 with 0-15-55= 70 ml serosanguinous drainage in 24 hours overnight; Chest tube # 2 with 115-220-140= 475 ml serosanguinous drainage in 24 hours overnight; no airleaks noted in either tube     Assessment:   Post-op: 1 days. Condition: In stable condition. Plan:   1. Cardiovascular: s/p CABG- BB, ASA, Statin. HR in the 90's at rest. Might need to increase BB again this morning. HTN: will look to increase his BB before adding an ARB. AC Plan: will start Plavix tomorrow. 2. Pulmonary: needs expansion- IS, OOBTC, PT/OT, ambulation    3. Neurology: analgesia as needed  Hx of ETOH abuse - reports drinking 12+ beers/day to RN. Will add beer to his lunch and dinner trays and monitor. 4. Nephrology: diurese as tolerated; continue IV lasix 40 mg BID    5. Endocrinology: insulin gtt    6. Hematology: acute blood loss anemia; H&H stable 12.2/35    7. Microbiology: nothing at this time    8. Nutrition: ADAT    9. Labs: reviewed as above    10. Post-op Drains/Wires: will monitor throughout the morning. If he remains unpaced will d/c TPW's this afternoon. 11. D/C Goals: DCP following, likely home with [facility] in next few days. 12. Continue post-op care of patient in the ICU    Meds:    The patient is on a beta-blocker   The patient is not on an ace-i/ARB- not diabetic   The patient is on a statin   The patient is on oral antiplatelet therapy   __________________________________________________________    LULU Siddiqi - CNP  6/29/2021  9:50 AM  Note reviewed, events of last 24 hours reviewed along with vital signs and I/Os and patient examined. Assessment and plans discussed and are as outlined above.      Armando Kumar MD, FACS, [facility], FACCP, [place]

## 2021-06-29 NOTE — PROGRESS NOTES
Physical Therapy    Facility/Department: [facility] C2 CARD TELEMETRY  Initial Assessment    NAME: Wilbert Donnelly  : 1975  MRN: [id number]    Date of Service: 2021    Discharge Recommendations:  Home with assist PRN   PT Equipment Recommendations  Equipment Needed: No    Assessment   Body structures, Functions, Activity limitations: Decreased functional mobility ; Decreased ROM; Decreased strength;Decreased endurance; Increased pain  Assessment: Pt was evaluated by PT s/p CABG surgery following STEMI on 21. Pt was educated on post-op precautions as well as strategies to perform bed mobility and transfers. Pt was able to perform bed mobility and transfers with SBA. Pt was able to ambulate 200ft with rollator requiring CGA for safety. Pt noted 7/10 pain which did not worsen after ambulation. Due to pt's excellent mobility status on POD #1, PT recommends D/C home with assist PRN. Treatment Diagnosis: decreased functional mobility, decreased endurance  Specific instructions for Next Treatment: increased mobility, stairs  Prognosis: Good  Decision Making: High Complexity  PT Education: Goals;PT Role;Plan of Care;Home Exercise Program;Transfer Training;General Safety; Family Education;Equipment;Weight-bearing Education;Gait Training;Disease Specific Education; Functional Mobility Training  Patient Education: disease specific education- Pt verbalized understanding of the importance of OOB activity  REQUIRES PT FOLLOW UP: Yes  Activity Tolerance: Patient Tolerated treatment well;Patient limited by pain  Activity Tolerance: /96, , SpO2 92%       Patient Diagnosis(es): There were no encounter diagnoses. has a past medical history of Alcohol abuse, Back pain, Class 1 obesity in adult, Coronary artery disease involving native coronary artery of native heart with unstable angina pectoris ([facility] [address] Gastroesophageal reflux disease without esophagitis, Hypertension, and Osteoarthritis.    has a past surgical history that includes Coronary artery bypass graft (N/A, 6/28/2021). Restrictions  Restrictions/Precautions: Cardiac, Weight Bearing, Surgical Protocols, Up as Tolerated  Required Braces or Orthoses?: No  Position Activity Restriction  Sternal Precautions: No Pushing, No Pulling, 5# Lifting Restrictions    Vision/Hearing  Vision: Within Functional Limits  Hearing: Within functional limits       Subjective  General  Chart Reviewed: Yes  Patient assessed for rehabilitation services?: Yes  Additional Pertinent Hx: alcohol abuse, smoker, GERD, HTN  Response To Previous Treatment: Not applicable  Family / Caregiver Present: Yes (wife)  Referring Practitioner: Robert Escobedo MD  Referral Date : 06/28/21  Diagnosis: STEMI, s/p CABG x 4 on 6/28/21  Pain Screening  Patient Currently in Pain: Yes (pt with c/o 7/10 surgical pain in sternum)  Non-Pharmacologic Interventions: Repositioned; Ambulation/increased activity;  Emotional support; Distraction    Orientation  Orientation  Overall Orientation Status: Within Normal Limits     Social/Functional History  Social/Functional History  Lives With: Spouse  Type of Home: House  Home Layout: One level  Home Access: Stairs to enter with rails  Entrance Stairs - Number of Steps: 3  Entrance Stairs - Rails: None  Bathroom Shower/Tub: Walk-in shower (lip)  Bathroom Toilet: Standard  Bathroom Equipment: Built-in shower seat  Bathroom Accessibility: Accessible  Home Equipment: Rolling walker  Receives Help From: Family  ADL Assistance: Independent  Homemaking Assistance: Independent  Homemaking Responsibilities: Yes  Ambulation Assistance: Independent  Transfer Assistance: Independent  Active : Yes  Mode of Transportation: Car  Occupation: Full time employment  Type of occupation:   Leisure & Hobbies: racing; dirt cars     Objective  RLE AROM: WFL  LLE AROM : WFL  Strength RLE: WFL  Strength LLE: WFL    RLE Tone: Normotonic  LLE Tone: Normotonic    Motor Control  Gross Motor?: WNL  Coordination  Rapid Alternating Movements: Normal    Sensation  Overall Sensation Status: WNL     Bed mobility  Supine to Sit: Stand by assistance  Sit to Supine: Unable to assess (stayed seated in chair)  Scooting: Independent (EOB)     Transfers  Sit to Stand: Stand by assistance  Stand to sit: Stand by assistance     Ambulation  Surface: level tile  Device: Rollator  Other Apparatus:  (2 chest tubes, aguayo, IV pole, tele)  Assistance: Contact guard assistance  Quality of Gait: decreased gait speed, increased medial/lateral sway, decreased heel strike, decreased step length  Gait Deviations: Slow Augustina;Decreased step length;Decreased arm swing;Decreased step height  Distance: 200ft  Comments: Pt was able to ambulate 200ft with rollator requiring 1 person IV pole management and 1 person CGA. Pt noted no pain, shortness of breath, or fatigue following walk although breathing was labored. Pt demonstrated appropriate balance, strength, and ROM in order to ambulate. Pt was challenged by increased pain (7/10), sternal precautions, decreased gait speed, decreased step length, decreased step height, and increased medial/lateral sway. Stairs/Curb  Stairs? :  (pt's breathing labored)     Balance  Posture: Good  Sitting - Static: Good  Sitting - Dynamic: Good  Standing - Static: Good  Standing - Dynamic: Fair;+    Plan   Times per week: 5-7x/wk  Times per day: Daily  Plan weeks: 1 week (6/29/21)  Specific instructions for Next Treatment: increased mobility, stairs  Current Treatment Recommendations: Strengthening, Home Exercise Program, ROM, Safety Education & Training, Balance Training, Endurance Training, Functional Mobility Training, Transfer Training, Gait Training, Stair training, Pain Management  Safety Devices:  All fall risk precautions in place, Call light within reach, Gait belt, Left in chair, Nurse notified    AM-PAC Score  AM-PAC Inpatient Mobility Raw Score : 23 (06/29/21 1200)  AM-PAC Inpatient T-Scale Score : 56.93 (06/29/21 1200)  Mobility Inpatient CMS 0-100% Score: 11.2 (06/29/21 1200)  Mobility Inpatient CMS G-Code Modifier : CI (06/29/21 1200)     Goals  Short term goals  Time Frame for Short term goals: 1 week (7/6/21)  Short term goal 1: Pt will ambulate >250 ft independently without an AD in order to increased independence in the home  Short term goal 2: Pt will transfer to/from chair, toilet, bed independently without an AD in order to increase mobility and independence  Short term goal 3: Pt will verbalize sternal precautions independently and appropriately follow them during a session in order to ensure safety by 7/2/21  Short term goal 4: Pt will ascend/descend 4 steps without railings requiring SBA in order to gait access to home  Patient Goals   Patient goals : \"I want to return home\"       Therapy Time   Individual Concurrent Group Co-treatment   Time In 0924         Time Out 0953         Minutes 29         Timed Code Treatment Minutes: [address] [facility]       Geo Mendoza [facility]  Cosigned by: Leodan Marroquin  PT, DPT #[id number]    If pt is unable to be seen after this session, please let this note serve as discharge summary. Please see case management note for discharge disposition. Thank you.

## 2021-06-30 LAB
ANION GAP SERPL CALCULATED.3IONS-SCNC: 7 MMOL/L (ref 3–16)
BUN BLDV-MCNC: 18 MG/DL (ref 7–20)
CALCIUM SERPL-MCNC: 8.5 MG/DL (ref 8.3–10.6)
CHLORIDE BLD-SCNC: 101 MMOL/L (ref 99–110)
CO2: 25 MMOL/L (ref 21–32)
CREAT SERPL-MCNC: 1 MG/DL (ref 0.9–1.3)
GFR AFRICAN AMERICAN: >60
GFR NON-AFRICAN AMERICAN: >60
GLUCOSE BLD-MCNC: 116 MG/DL (ref 70–99)
HCT VFR BLD CALC: 31.8 % (ref 40.5–52.5)
HEMOGLOBIN: 11.4 G/DL (ref 13.5–17.5)
MAGNESIUM: 2.1 MG/DL (ref 1.8–2.4)
MCH RBC QN AUTO: 33.1 PG (ref 26–34)
MCHC RBC AUTO-ENTMCNC: 35.9 G/DL (ref 31–36)
MCV RBC AUTO: 92.2 FL (ref 80–100)
PDW BLD-RTO: 12.7 % (ref 12.4–15.4)
PLATELET # BLD: 122 K/UL (ref 135–450)
PMV BLD AUTO: 9.3 FL (ref 5–10.5)
POTASSIUM SERPL-SCNC: 4.3 MMOL/L (ref 3.5–5.1)
RBC # BLD: 3.45 M/UL (ref 4.2–5.9)
SODIUM BLD-SCNC: 133 MMOL/L (ref 136–145)
WBC # BLD: 14.6 K/UL (ref 4–11)

## 2021-06-30 PROCEDURE — 2580000003 HC RX 258: Performed by: THORACIC SURGERY (CARDIOTHORACIC VASCULAR SURGERY)

## 2021-06-30 PROCEDURE — 83735 ASSAY OF MAGNESIUM: CPT

## 2021-06-30 PROCEDURE — 80048 BASIC METABOLIC PNL TOTAL CA: CPT

## 2021-06-30 PROCEDURE — 99024 POSTOP FOLLOW-UP VISIT: CPT | Performed by: NURSE PRACTITIONER

## 2021-06-30 PROCEDURE — 97530 THERAPEUTIC ACTIVITIES: CPT

## 2021-06-30 PROCEDURE — 97535 SELF CARE MNGMENT TRAINING: CPT

## 2021-06-30 PROCEDURE — 6370000000 HC RX 637 (ALT 250 FOR IP): Performed by: NURSE PRACTITIONER

## 2021-06-30 PROCEDURE — 99232 SBSQ HOSP IP/OBS MODERATE 35: CPT | Performed by: NURSE PRACTITIONER

## 2021-06-30 PROCEDURE — 6360000002 HC RX W HCPCS: Performed by: THORACIC SURGERY (CARDIOTHORACIC VASCULAR SURGERY)

## 2021-06-30 PROCEDURE — 6370000000 HC RX 637 (ALT 250 FOR IP): Performed by: THORACIC SURGERY (CARDIOTHORACIC VASCULAR SURGERY)

## 2021-06-30 PROCEDURE — 2140000000 HC CCU INTERMEDIATE R&B

## 2021-06-30 PROCEDURE — 97116 GAIT TRAINING THERAPY: CPT

## 2021-06-30 PROCEDURE — 85027 COMPLETE CBC AUTOMATED: CPT

## 2021-06-30 PROCEDURE — 97110 THERAPEUTIC EXERCISES: CPT

## 2021-06-30 RX ORDER — CLOPIDOGREL BISULFATE 75 MG/1
75 TABLET ORAL DAILY
Status: DISCONTINUED | OUTPATIENT
Start: 2021-06-30 | End: 2021-07-02 | Stop reason: HOSPADM

## 2021-06-30 RX ORDER — FAMOTIDINE 20 MG/1
20 TABLET, FILM COATED ORAL 2 TIMES DAILY
Status: DISCONTINUED | OUTPATIENT
Start: 2021-06-30 | End: 2021-07-02 | Stop reason: HOSPADM

## 2021-06-30 RX ADMIN — KETOROLAC TROMETHAMINE 15 MG: 30 INJECTION, SOLUTION INTRAMUSCULAR; INTRAVENOUS at 02:29

## 2021-06-30 RX ADMIN — POLYETHYLENE GLYCOL 3350 17 G: 17 POWDER, FOR SOLUTION ORAL at 07:54

## 2021-06-30 RX ADMIN — FAMOTIDINE 20 MG: 20 TABLET ORAL at 07:54

## 2021-06-30 RX ADMIN — MAGNESIUM GLUCONATE 500 MG ORAL TABLET 400 MG: 500 TABLET ORAL at 07:53

## 2021-06-30 RX ADMIN — CLOPIDOGREL BISULFATE 75 MG: 75 TABLET ORAL at 12:22

## 2021-06-30 RX ADMIN — Medication 100 MG: at 07:54

## 2021-06-30 RX ADMIN — METOPROLOL TARTRATE 25 MG: 25 TABLET, FILM COATED ORAL at 07:53

## 2021-06-30 RX ADMIN — FUROSEMIDE 40 MG: 10 INJECTION, SOLUTION INTRAMUSCULAR; INTRAVENOUS at 07:54

## 2021-06-30 RX ADMIN — ACETAMINOPHEN 650 MG: 325 TABLET ORAL at 07:53

## 2021-06-30 RX ADMIN — ATORVASTATIN CALCIUM 80 MG: 80 TABLET, FILM COATED ORAL at 20:42

## 2021-06-30 RX ADMIN — OXYCODONE 5 MG: 5 TABLET ORAL at 22:56

## 2021-06-30 RX ADMIN — SODIUM CHLORIDE, PRESERVATIVE FREE 10 ML: 5 INJECTION INTRAVENOUS at 07:56

## 2021-06-30 RX ADMIN — FAMOTIDINE 20 MG: 20 TABLET ORAL at 20:42

## 2021-06-30 RX ADMIN — POTASSIUM CHLORIDE 10 MEQ: 750 TABLET, EXTENDED RELEASE ORAL at 12:22

## 2021-06-30 RX ADMIN — MUPIROCIN: 20 OINTMENT TOPICAL at 07:56

## 2021-06-30 RX ADMIN — ASPIRIN 81 MG: 81 TABLET, COATED ORAL at 07:54

## 2021-06-30 RX ADMIN — SODIUM CHLORIDE, PRESERVATIVE FREE 10 ML: 5 INJECTION INTRAVENOUS at 20:39

## 2021-06-30 RX ADMIN — Medication 15 ML: at 07:56

## 2021-06-30 RX ADMIN — ACETAMINOPHEN 650 MG: 325 TABLET ORAL at 14:02

## 2021-06-30 RX ADMIN — OXYCODONE 10 MG: 5 TABLET ORAL at 14:34

## 2021-06-30 RX ADMIN — ACETAMINOPHEN 650 MG: 325 TABLET ORAL at 02:29

## 2021-06-30 RX ADMIN — BISACODYL 5 MG: 5 TABLET, COATED ORAL at 07:54

## 2021-06-30 RX ADMIN — MULTIPLE VITAMINS W/ MINERALS TAB 1 TABLET: TAB at 07:53

## 2021-06-30 RX ADMIN — FUROSEMIDE 40 MG: 10 INJECTION, SOLUTION INTRAMUSCULAR; INTRAVENOUS at 17:59

## 2021-06-30 RX ADMIN — KETOROLAC TROMETHAMINE 15 MG: 30 INJECTION, SOLUTION INTRAMUSCULAR; INTRAVENOUS at 07:55

## 2021-06-30 RX ADMIN — POTASSIUM CHLORIDE 10 MEQ: 750 TABLET, EXTENDED RELEASE ORAL at 16:50

## 2021-06-30 RX ADMIN — METOPROLOL TARTRATE 25 MG: 25 TABLET, FILM COATED ORAL at 20:42

## 2021-06-30 RX ADMIN — POTASSIUM CHLORIDE 10 MEQ: 750 TABLET, EXTENDED RELEASE ORAL at 07:54

## 2021-06-30 RX ADMIN — FONDAPARINUX SODIUM 2.5 MG: 2.5 INJECTION, SOLUTION SUBCUTANEOUS at 12:23

## 2021-06-30 RX ADMIN — CEFAZOLIN 2000 MG: 10 INJECTION, POWDER, FOR SOLUTION INTRAVENOUS at 03:58

## 2021-06-30 ASSESSMENT — PAIN DESCRIPTION - DESCRIPTORS
DESCRIPTORS: ACHING
DESCRIPTORS: DISCOMFORT
DESCRIPTORS: SORE
DESCRIPTORS: ACHING;SORE

## 2021-06-30 ASSESSMENT — PAIN SCALES - GENERAL
PAINLEVEL_OUTOF10: 6
PAINLEVEL_OUTOF10: 5
PAINLEVEL_OUTOF10: 6
PAINLEVEL_OUTOF10: 0
PAINLEVEL_OUTOF10: 6
PAINLEVEL_OUTOF10: 6
PAINLEVEL_OUTOF10: 7
PAINLEVEL_OUTOF10: 6
PAINLEVEL_OUTOF10: 7

## 2021-06-30 ASSESSMENT — PAIN DESCRIPTION - PROGRESSION
CLINICAL_PROGRESSION: NOT CHANGED

## 2021-06-30 ASSESSMENT — PAIN SCALES - WONG BAKER: WONGBAKER_NUMERICALRESPONSE: 2

## 2021-06-30 ASSESSMENT — PAIN DESCRIPTION - LOCATION
LOCATION: CHEST;STERNUM
LOCATION: CHEST
LOCATION: NECK
LOCATION: CHEST

## 2021-06-30 ASSESSMENT — PAIN DESCRIPTION - ORIENTATION
ORIENTATION: MID
ORIENTATION: RIGHT
ORIENTATION: MID
ORIENTATION: MID

## 2021-06-30 ASSESSMENT — PAIN DESCRIPTION - ONSET
ONSET: ON-GOING
ONSET: ON-GOING

## 2021-06-30 ASSESSMENT — PAIN DESCRIPTION - FREQUENCY
FREQUENCY: INTERMITTENT
FREQUENCY: INTERMITTENT

## 2021-06-30 ASSESSMENT — PAIN DESCRIPTION - PAIN TYPE
TYPE: ACUTE PAIN;SURGICAL PAIN
TYPE: ACUTE PAIN
TYPE: SURGICAL PAIN;ACUTE PAIN
TYPE: ACUTE PAIN;SURGICAL PAIN

## 2021-06-30 ASSESSMENT — ENCOUNTER SYMPTOMS
GASTROINTESTINAL NEGATIVE: 1
RESPIRATORY NEGATIVE: 1

## 2021-06-30 NOTE — PROGRESS NOTES
Occupational Therapy  Facility/Department: [facility] C2 CARD TELEMETRY  Daily Treatment Note  NAME: Gaston Velasquez  : 1975  MRN: [id number]    Date of Service: 2021    Discharge Recommendations:  24 hour supervision or assist       Assessment   Performance deficits / Impairments: Decreased functional mobility ; Decreased ADL status; Decreased balance;Decreased strength  Assessment: Pt very pleasant and cooperative, agreeable. Pt in chair upon arrival, RN clears for therapy and reports she will pull lines after therapy/breakfast. Pt cont to require increased assist for LB dressing d/t sternal precautions and sternal pain with bending. Pt able to amb from room to day room and then entire lap around ICU unit with rollator. Pt cont to require min cues for sternal precautions with transfers. Pt left up in chair and set up for breakfast. RN notified. OT Education: Plan of Care;OT Role;Precautions; ADL Adaptive Strategies;Transfer Training;Equipment; Family Education  Patient Education: Disease specific: Pt educated on sternal precaution and able to verbally read back precautions after education. Pt verb understanding. REQUIRES OT FOLLOW UP: Yes  Activity Tolerance  Activity Tolerance: Patient Tolerated treatment well  Activity Tolerance: 133/88 HR 99 O2 94% on RA at rest and s/p ambulation around unit  Safety Devices  Safety Devices in place: Yes  Type of devices: Left in chair;Nurse notified;Call light within reach; Chair alarm in place;Gait belt         Patient Diagnosis(es): There were no encounter diagnoses. has a past medical history of Alcohol abuse, Back pain, Class 1 obesity in adult, Coronary artery disease involving native coronary artery of native heart with unstable angina pectoris ([facility] [address] Gastroesophageal reflux disease without esophagitis, Hypertension, and Osteoarthritis.    has a past surgical history that includes Coronary artery bypass graft (N/A, 6/28/2021). Restrictions  Restrictions/Precautions  Restrictions/Precautions: Cardiac, Weight Bearing, Surgical Protocols, Up as Tolerated  Required Braces or Orthoses?: No  Upper Extremity Weight Bearing Restrictions  Other: 5lb WB tolerance  Position Activity Restriction  Sternal Precautions: No Pushing, No Pulling, 5# Lifting Restrictions  Sternal Precautions: no pushing, pulling, lifting >5lb  Subjective   General  Chart Reviewed: Yes, Orders, Progress Notes, Labs, Imaging, History and Physical, Operative Notes  Patient assessed for rehabilitation services?: Yes  Family / Caregiver Present: No  Referring Practitioner: Lyubov Tang MD 6/28/21  Diagnosis: CAD, CABG x 4 6/28/21  Subjective  Subjective: Pt pleasant and cooperative, motivated  General Comment  Comments: Rn clears for therapy  Pain Assessment  Pain Level: 6  Pain Type: Acute pain;Surgical pain  Pain Location: Chest;Sternum  Pain Orientation: Mid  Pain Descriptors: Aching; Sore  Vital Signs  Patient Currently in Pain: Yes   Orientation  Orientation  Overall Orientation Status: Within Functional Limits  Objective    ADL  Feeding: Setup  Grooming: Setup  LE Dressing: Maximum assistance  Toileting: Dependent/Total (aguayo)        Balance  Sitting Balance: Supervision  Standing Balance: Contact guard assistance  Standing Balance  Time: >10 min  Activity: functional mobility in room and around entire unit  Functional Mobility  Functional - Mobility Device: 4-Wheeled Walker  Activity: To/from bathroom; Other  Assist Level: Supervision     Transfers  Sit to stand: Supervision  Stand to sit: Supervision  Transfer Comments: min cues for sternal precautions, as pt attempting to pull up from rollator        Cognition  Overall Cognitive Status: [facility]  Times per week: 5-6x's a week while in ICU    AM-PAC Score        AM-PAC Inpatient Daily Activity Raw Score: 16 (06/30/21 1054)  AM-PAC Inpatient ADL T-Scale Score : 35.96 (06/30/21 1054)  ADL Inpatient CMS 0-100% Score: 53.32 (06/30/21 1054)  ADL Inpatient CMS G-Code Modifier : CK (06/30/21 1054)    Goals  Short term goals  Time Frame for Short term goals: 1 week unless otherwise specified 7/6  Short term goal 1: Pt will complete LE dressing with CGA by 7/4  Short term goal 2: Pt will complete toilet transfers with supervision w/o use of 4ww  Short term goal 3: Pt will complete standing level ADLs with SBA for balance  Patient Goals   Patient goals : \"to be able to get back to work\"       Therapy Time   Individual Concurrent Group Co-treatment   Time In 0846         Time Out 0926         Minutes 40         Timed Code Treatment Minutes: 40 Minutes   If pt discharges prior to next session, this note will serve as discharge summary. See case management note for discharge disposition.         Nerissa Philip OT

## 2021-06-30 NOTE — PROGRESS NOTES
Criteria met per clinical pathway to remove epicardial pacing wires & MD order received. Procedure explained to patient. Pacing wire site within normal limits. Cleansed site with Chloroprep. Atrial and Ventricular pacing wires removed per policy. Pt did have 5 beat run of non-sustained V tach after ventricular wires were removed. Pt was asymptomatic and returned to NSR. Dry sterile dressing applied. Vital signs will be monitored and recorded per open heart protocol (every 15 minutes X 2, every 30 minutes X 1, and every hour X 1). Patient tolerated procedure well, heart tones easily auscultated, oxygen saturation within normal limits. Signs/symtoms for cardiac tamponade will be monitored closely. MD order received to remove RLE KARLEE drain. Procedure explained to patient. KARLEE site within normal limits. Cleansed site with Chloroprep. KARLEE removed per policy with no complications. Dry dressing applied to site.

## 2021-06-30 NOTE — PROGRESS NOTES
[address]  Cardiology  Progress Note    Admission date:  2021    Reason for follow up visit: STEMI/CAD    HPI/CC: Betty Brenner is a 41 y.o. male who was admitted 2021 from [place]. [place] for chest pain and found to have inferolateral STEMI. LHC showed significant LCx treated with PTA, also had multi vessel disease, IABP and CT surgery consult. Echo showed EF 45-50%. On 2021 he had CABG x4 and MERRILL clip. Rhythm has been sinus-sinus tach with PAT. Subjective: Post op day 2. Feels ok, no chest pain or shortness of breath. Vitals:  Blood pressure 119/89, pulse 88, temperature 97.5 °F (36.4 °C), temperature source Oral, resp. rate 18, height 5' 9\" (1.753 m), weight 202 lb 8 oz (91.9 kg), SpO2 95 %.   Temp  Av.1 °F (36.7 °C)  Min: 97.5 °F (36.4 °C)  Max: 98.3 °F (36.8 °C)  Pulse  Av.3  Min: 84  Max: 126  BP  Min: 111/77  Max: 146/86  SpO2  Av.8 %  Min: 91 %  Max: 99 %    24 hour I/O    Intake/Output Summary (Last 24 hours) at 2021 1531  Last data filed at 2021 1421  Gross per 24 hour   Intake 565 ml   Output 4643 ml   Net -4078 ml     Current Facility-Administered Medications   Medication Dose Route Frequency Provider Last Rate Last Admin    metoprolol tartrate (LOPRESSOR) tablet 25 mg  25 mg Oral BID Edy Smith, APRN - CNP   25 mg at 21 0753    clopidogrel (PLAVIX) tablet 75 mg  75 mg Oral Daily Edy Smith APRN - CNP   75 mg at 21 1222    famotidine (PEPCID) tablet 20 mg  20 mg Oral BID Leelee Ontiveros MD   20 mg at 21 0754    sodium chloride flush 0.9 % injection 10 mL  10 mL Intravenous 2 times per day Leelee Ontiveros MD   10 mL at 21 0756    sodium chloride flush 0.9 % injection 10 mL  10 mL Intravenous PRN Leelee Ontiveros MD        0.9 % sodium chloride infusion  25 mL Intravenous PRN Leelee Ontiveros MD        fondaparinux (ARIXTRA) injection 2.5 mg  2.5 mg Subcutaneous Daily Leelee Ontiveros MD   2.5 mg at 21 1223    ondansetron (ZOFRAN) injection 4 mg  4 mg Intravenous Q8H PRN Javier Sanchez MD        aspirin EC tablet 81 mg  81 mg Oral Daily Javier Sanchez MD   81 mg at 06/30/21 0754    acetaminophen (TYLENOL) tablet 650 mg  650 mg Oral Q6H Javier Sanchez MD   650 mg at 06/30/21 1402    oxyCODONE (ROXICODONE) immediate release tablet 5 mg  5 mg Oral Q4H PRN Javier Sanchez MD        Or    oxyCODONE (ROXICODONE) immediate release tablet 10 mg  10 mg Oral Q4H PRN Javier Sanchez MD   10 mg at 06/30/21 1434    morphine (PF) injection 2 mg  2 mg Intravenous Q2H PRN Javier Sanchez MD        Or    morphine (PF) injection 4 mg  4 mg Intravenous Q2H PRN Javier Sanchez MD   4 mg at 06/29/21 1912    chlorhexidine (PERIDEX) 0.12 % solution 15 mL  15 mL Mouth/Throat BID Javier Sanchez MD   15 mL at 06/30/21 0756    furosemide (LASIX) injection 40 mg  40 mg Intravenous BID Javier Sanchez MD   40 mg at 06/30/21 0754    magnesium oxide (MAG-OX) tablet 400 mg  400 mg Oral Daily Javier Sanchez MD   400 mg at 06/30/21 4886    mupirocin (BACTROBAN) 2 % ointment   Nasal BID Javier Sanchez MD   Given at 06/30/21 0756    nitroGLYCERIN (NITRODUR) 0.2 MG/HR 1 patch  1 patch Transdermal Daily Javier Sanchez MD   1 patch at 06/30/21 0757    potassium chloride (KLOR-CON M) extended release tablet 10 mEq  10 mEq Oral TID WC Javier Sanchez MD   10 mEq at 06/30/21 1222    diphenhydrAMINE (BENADRYL) tablet 25 mg  25 mg Oral Nightly PRN Javier Sanchez MD        polyethylene glycol [facility]) packet 17 g  17 g Oral Daily Javier Sanchez MD   17 g at 06/30/21 0754    bisacodyl (DULCOLAX) EC tablet 5 mg  5 mg Oral Daily Javier Sanchez MD   5 mg at 06/30/21 0754    bisacodyl (DULCOLAX) suppository 10 mg  10 mg Rectal Daily PRN Javier Sanchez MD        atorvastatin (LIPITOR) tablet 80 mg  80 mg Oral Nightly Javier Sanchez MD   80 mg at 06/29/21 2027    potassium chloride 20 mEq/50 mL IVPB (Central Line)  20 mEq Intravenous PRN Vj Brian MD        magnesium sulfate 2000 mg in 50 mL IVPB premix  2,000 mg Intravenous PRN Vj Brian MD        albuterol sulfate  (90 Base) MCG/ACT inhaler 2 puff  2 puff Inhalation Q6H PRN Vj Brian MD        thiamine tablet 100 mg  100 mg Oral Daily Vj Brian MD   100 mg at 06/30/21 6632    therapeutic multivitamin-minerals 1 tablet  1 tablet Oral Daily Vj Brian MD   1 tablet at 06/30/21 9451     Review of Systems   Constitutional: Negative. Respiratory: Negative. Cardiovascular: Negative. Gastrointestinal: Negative. Neurological: Negative. Objective:     Telemetry monitor: SR    Physical Exam:  Constitutional:  Comfortable and alert, NAD, appears stated age  Eyes: PERRL, sclera nonicteric  Neck:  Supple, no masses, no thyroidmegaly, no JVD  Skin:  Warm and dry; no rash or lesions; +sternal incision  Heart: Regular, normal apex, S1 and S2 normal, no M/G/R  Lungs:  Normal respiratory effort; clear; no wheezing/rhonchi/rales  Abdomen: soft, non tender, + bowel sounds  Extremities:  No edema or cyanosis; no clubbing  Neuro: alert and oriented, moves legs and arms equally, normal mood and affect    Data Reviewed:    CABG 6/28/2021:  Urgent CABG X 4, with pedicled LIMA to LAD, sequential Greater Saphenous VG to ramus intermedius then on to OM, separate single Greater SVG to PV br of RCA, LAAppendage obliteration with 35 mm Atricure LA clip, CPB, EVH Right Greater Saphenous vein, RANI, Epiaortic ultrasound, Doppler verification of grafts, Bilateral 5 level intercostal nerve block(Exparel), Platelet gel application. Removal Preop IABP     Echo 6/26/2021:  The left ventricular systolic function is mildly reduced with an ejection   fraction of 45 -50 %. There is hypokinesis of the inferolateral and anterolateral walls. Mild concentric left ventricular hypertrophy.    Left ventricular diastolic filling with proximal-mid 75% stenosis.       LCX Markedly tortuous vessel particularly at ostium and in the proximal segments, there is 50 to 60% stenosis followed by mid-distal 99% stenosis with LAILA II flow.         RI  This vessel could also be described as a high OM1 with 40-50 stent proximal-mid stenosis.       RCA Dominant cough tortuous, calcified, proximal 50% stenosis, mid 50% stenosis, distal 90% stenosis. PERCUTANEOUS INTERVENTION DESCRIPTION    Heparin was used for anticoagulation, patient had already been preloaded with Brilinta. Integrilin was given during the procedure and was ordered for continuous infusion as patient may ultimately require CABG.     Initially a 6 Slovak mL 3.5 guiding catheter was taken and this was used to intubate the left main. A choice floppy wire was initially taken however due to severe circumflex tortuosity, there was difficulty with wiring the circumflex lesion which was felt to be the culprit for patient's current presentation. As such a [employer] dual lumen microcatheter was taken and the choice floppy wire as well as a [employer]onic cougar wire were taken through this. The choice floppy wire was advanced with the rapid exchange port and the Medtronic cougar was advanced through the proximal port. With these wires in place including with the choice wire in the high OM/ramus artery, the [employer]onic cougar wire was able to be manipulated into the circumflex and across the culprit lesion. Then the choice wire was removed and using a Trapper balloon, the microcatheter was also able to be removed. With a Medtronic cougar wire in place, a 2.5 mm balloon was able to be utilized to perform angioplasty of the circumflex. With that there was improvement in flow from LAILA II to LAILA-3. There is 20% residual stenosis. Patient's EKG improved. There is no further chest pain.   Residual CAD/ASHD was felt to be best treated with CABG and CT surgery consultation has been placed.   Findings/plans were discussed with patient and wife, they understand the gravity situation that there is a high risk of deterioration and complications which include with extensive CAD. They understand and wish to proceed with plans as outlined. CONCLUSIONS:    Successful PTA of circumflex  Successful intra-aortic balloon pump placement   Multivessel CAD/ASHD  Refer to CT surgery for consideration of CABG  If not felt to be a candidate for CABG, can consider high risk multivessel PCI. Lab Reviewed:     Renal Profile:  Lab Results   Component Value Date    CREATININE 1.0 06/30/2021    BUN 18 06/30/2021     06/30/2021    K 4.3 06/30/2021     06/30/2021    CO2 25 06/30/2021     CBC:    Lab Results   Component Value Date    WBC 14.6 06/30/2021    RBC 3.45 06/30/2021    HGB 11.4 06/30/2021    HCT 31.8 06/30/2021    MCV 92.2 06/30/2021    RDW 12.7 06/30/2021     06/30/2021     BNP:  No results found for: PROBNP  Fasting Lipid Panel:  No results found for: CHOL, HDL, TRIG  Cardiac Enzymes:  CK/MbTroponin  Lab Results   Component Value Date    TROPONINI 0.06 06/26/2021     PT/ INR   Lab Results   Component Value Date    INR 1.26 06/29/2021    INR 1.28 06/28/2021    INR 1.06 06/28/2021    PROTIME 14.6 06/29/2021    PROTIME 14.9 06/28/2021    PROTIME 12.3 06/28/2021     PTT No results found for: PTT   Lab Results   Component Value Date    MG 2.10 06/30/2021    No results found for: TSH    All labs and imaging reviewed today    Assessment:  STEMI/CAD: s/p CABG x4 and MERRILL clip 6/28/2021; s/p PTA LCx and IABP placed 6/26/2021  Ischemic cardiomyopathy: EF 45-50%  HTN: stable  HLD  Tobacco abuse: counseled  Alcohol abuse    Plan:   1. Diuresis  2. Aspirin, statin, beta blocker  3. Plavix when able  4. ACE/ARB as tolerated for cardiomyopathy  5. Ambulate  6. Follow up arranged. Cardiology will sign off, please call with questions.      Rozina Moreira APRN-CNP  [facility]  [phone]

## 2021-06-30 NOTE — PLAN OF CARE
Problem: Falls - Risk of:  Goal: Will remain free from falls  Description: Will remain free from falls  Outcome: Ongoing  Goal: Absence of physical injury  Description: Absence of physical injury  Outcome: Ongoing     Problem:  Activity Intolerance:  Goal: Able to perform prescribed physical activity  Description: Able to perform prescribed physical activity  Outcome: Ongoing  Goal: Ability to tolerate increased activity will improve  Description: Ability to tolerate increased activity will improve  Outcome: Ongoing     Problem: Pain:  Goal: Pain level will decrease  Description: Pain level will decrease  Outcome: Ongoing  Goal: Control of acute pain  Description: Control of acute pain  Outcome: Ongoing     Problem: Pain:  Goal: Pain level will decrease  Description: Pain level will decrease  Outcome: Ongoing

## 2021-06-30 NOTE — PROGRESS NOTES
CVTS Cardiothoracic Progress Note:                                CC:  Post op follow up     Surgery: 6/28/21 Urgent coronary bypass grafting surgery x4 with sequential greater saphenous vein graft to the ramus intermediate branch onto the obtuse marginal branch of circumflex, separate single greater saphenous vein graft to the posterior ventricular branch of the right coronary artery, pedicled left internal artery to the LAD. Left atrial appendage obliteration with 35-mm AtriCure left atrial clip. Cardiopulmonary bypass. Endoscopic vein harvest of right greater saphenous vein. Transesophageal echo. Epiaortic ultrasound. Doppler verification of grafts. Bilateral five-level intercostal nerve block with Exparel. Platelet gel application. Removal of preoperatively placed intraaortic balloon pump. Hospital course:   6/29 Up in chair, easily conversational, in NAD. Remains in SR.   6/30 Up in chair in NAD. SR.      Subjective:   Dietary Intake: improving   no Nausea   Pain Control: controlled  Complaints: mild post op pain  Bowels: have not moved    Past Medical History:   Diagnosis Date    Alcohol abuse 6/26/2021    Back pain     Class 1 obesity in adult 6/26/2021    Coronary artery disease involving native coronary artery of native heart with unstable angina pectoris (HCC)     Gastroesophageal reflux disease without esophagitis 12/18/2015    Hypertension     Osteoarthritis         Past Surgical History:   Procedure Laterality Date    CORONARY ARTERY BYPASS GRAFT N/A 6/28/2021    CABG CORONARY ARTERY BYPASS GRAFT X4, INTERNAL MAMMARY ARTERY, SAPHENOUS VEIN GRAFT AND LEFT ATRIAL APPENDAGE CLIP, ON PUMP WITH BILATERAL 5 LEVEL INTERCOSTAL NERVE BLOCK, PLATELET GEL APPLICATION, AND REMOVAL OF INTRA  AORTIC BALLOON PUMP performed by Lyubov Tang MD at [facility] as of 06/26/2021 - Fully Reviewed 06/26/2021   Allergen Reaction Noted    Penicillins Other (See Comments) 11/20/2012        Patient Active Problem List   Diagnosis    Thoracic back pain    Tobacco use    Mechanical back pain    Gastroesophageal reflux disease without esophagitis    Snoring    Shortness of breath    Chondromalacia patellae of left knee    Left knee pain    ST elevation myocardial infarction (STEMI) ([facility]    STEMI (ST elevation myocardial infarction) ([facility]    Essential hypertension    Alcohol abuse    Class 1 obesity in adult    Observed sleep apnea    Ischemic cardiomyopathy    Elevated LFTs    Coronary artery disease involving native coronary artery of native heart with unstable angina pectoris (HCC)        Vital Signs: BP (!) 143/88   Pulse 100   Temp 98.3 °F (36.8 °C) (Oral)   Resp 18   Ht 5' 9\" (1.753 m)   Wt 202 lb 8 oz (91.9 kg)   SpO2 96%   BMI 29.90 kg/m²  O2 Flow Rate (L/min): 1 L/min     Admission Weight: Weight: 220 lb 10.9 oz (100.1 kg)    Weight on 6/28 (94.2 kg) Pre-op   Weight on 6/29 (98.7 kg)   6/30  91.9 kg    Intake/Output:     Intake/Output Summary (Last 24 hours) at 6/30/2021 0927  Last data filed at 6/30/2021 0900  Gross per 24 hour   Intake 1274.92 ml   Output 4833 ml   Net -3558.08 ml      Extubation Time: 6/28 at 13:13  Transition Time: 6/29 at 08:54     LABORATORY DATA:   CBC:   Recent Labs     06/28/21  1456 06/29/21  0406 06/30/21  0614   WBC 24.0* 16.1* 14.6*   HGB 13.3* 12.2* 11.4*   HCT 37.5* 35.0* 31.8*   MCV 93.0 93.4 92.2   * 118* 122*     BMP:   Recent Labs     06/28/21  0330 06/28/21  0330 06/28/21  1504 06/28/21  1504 06/28/21  1853 06/29/21  0406 06/30/21  0614      < > 134*  --   --  132* 133*   K 4.2   < > 4.7   < > 4.4 4.4 4.3      < > 106  --   --  104 101   CO2 27   < > 21  --   --  20* 25   PHOS 4.0  --   --   --   --   --   --    BUN 14   < > 13  --   --  14 18   CREATININE 1.0   < > 1.1  --   --  0.9 1.0    < > = values in this interval not displayed.      MG:    Recent Labs     06/28/21  1853 06/29/21  0406 06/30/21  0614   MG 2.40 2.20 2.10 PT/INR:   Recent Labs     06/28/21  0330 06/28/21  1459 06/29/21  0406   PROTIME 12.3 14.9* 14.6*   INR 1.06 1.28* 1.26*     APTT:   Recent Labs     06/28/21  1459   APTT 30.7     CXR: 6/28/21   Impression   Status post median sternotomy.  No pneumothorax     __________________________________________________________    Objective:   General appearance: awake, A&O, in NAD  Lungs: CTAB  Heart: S1S2 normal; SR on monitor, HR in the 90's  Chest: symmetrical expansion with inspiration and expirations; no rocking of sternum noted   Abdomen: soft, non-tender  Bowel sounds: normoactive  Kidneys: UOP [id number]= 3985 ml in 24 hrs; Cr 0.9  Wound/Incisions: Midsternal incision with dressing CDI; RLE incisions with dressings CDI; Pacing wires taped and secured  Extremities: BLE pulses palpable; minimal tibial swelling noted   Neurological: intact, non focal exam  Chest tubes/Drains: Chest tube # 1 with 90-30-20= 140 ml serosanguinous drainage in 24 hours overnight; Chest tube # 2 with 120-80-60= 260 ml serosanguinous drainage in 24 hours overnight; no airleaks noted in either tube     Assessment:   Post-op: 2 days. Condition: In stable condition. Plan:   1. Cardiovascular: s/p CABG- BB, ASA, Statin. HR in the 90's at rest. Increased BB this morning. HTN: will monitor how he does with increased BB before adding ARB. AC Plan: will start Plavix after TPW removal.     2. Pulmonary: needs expansion- IS, OOBTC, PT/OT, ambulation    3. Neurology: analgesia as needed  Hx of ETOH abuse - reports drinking 12+ beers/day to RN. Added beer to his lunch and dinner trays and monitor. 4. Nephrology: diurese as tolerated; continue IV lasix 40 mg BID    5. Endocrinology: BG stable, not diabetic. Will d/c insulin. 6. Hematology: acute blood loss anemia; H&H stable     7. Microbiology: nothing at this time    8. Nutrition: ADAT    9. Labs: reviewed as above    10.  Post-op Drains/Wires: will remove TPW's, dede and KARLEE drain this morning. After ambulation if no large dumping might remove a chest tube later this afternoon. 11. D/C Goals: DCP following, likely home with Jose Lara in next few days. 12. Continue post-op care of patient in the ICU    Meds:    The patient is on a beta-blocker   The patient is not on an ace-i/ARB- not diabetic   The patient is on a statin   The patient is on oral antiplatelet therapy   __________________________________________________________    LULU Franco - CNP  6/30/2021  9:27 AM  Note reviewed, events of last 24 hours reviewed along with vital signs and I/Os and patient examined. Assessment and plans discussed and are as outlined above.      Scarlet Barbour MD, FACS, [facility] [place], FACCP, [place]

## 2021-06-30 NOTE — PROGRESS NOTES
Tolerated  Required Braces or Orthoses?: No  Upper Extremity Weight Bearing Restrictions  Other: 5lb WB tolerance  Position Activity Restriction  Sternal Precautions: No Pushing, No Pulling, 5# Lifting Restrictions  Sternal Precautions: no pushing, pulling, lifting >5lb  Subjective   General  Chart Reviewed: Yes  Additional Pertinent Hx: alcohol abuse, smoker, GERD, HTN  Response To Previous Treatment: Not applicable  Family / Caregiver Present: No  Referring Practitioner: Robert Escobedo MD  Subjective  Subjective: Pt was agreeable and pleasant for therapy. Pt noted mild pain from neck IV port. General Comment  Comments: Pt was lying in bed upon arrival- RN cleared for therapy  Pain Screening  Patient Currently in Pain: Yes  Pain Assessment  [place] Pain Rating: Hurts a little bit  Pain Type: Acute pain  Pain Location: Neck  Pain Orientation: Right  Pain Descriptors: Discomfort  Vital Signs  WZ=182/92, SpO2=96%, HR=99bpm   Patient Currently in Pain: Yes       Orientation  Orientation  Overall Orientation Status: Within Normal Limits  Objective   Bed mobility  Supine to Sit: Minimal assistance  Scooting: Independent  Transfers  Sit to Stand: Stand by assistance  Stand to sit: Stand by assistance  Ambulation  Ambulation?: Yes  More Ambulation?: No  Ambulation 1  Surface: level tile  Device: No Device  Other Apparatus:  (2 chest tubes)  Assistance: Contact guard assistance  Quality of Gait: decreased gait speed, increased medial/lateral sway, decreased heel strike, decreased step length  Gait Deviations: Slow Augustina;Decreased step length;Decreased arm swing;Decreased step height  Distance: 200ft  Comments: Pt was able to ambulate 200ft without an AD requiring 1 person CGA. Pt noted no pain, shortness of breath, or fatigue following walk although breathing was labored. Pt demonstrated appropriate balance, strength, and ROM in order to ambulate.  Pt was challenged by sternal precautions, decreased gait speed, decreased step length, decreased step height, and increased medial/lateral sway. Stairs/Curb  Stairs?: Yes  Stairs  # Steps : 4  Stairs Height: 8\"  Rails: None  Device: No Device  Other Apparatus:  (2 chest tubes)  Assistance: Contact guard assistance  Comment: In order to gain access into pt's home, pt ascended/descended 4 steps requiring CGA. Pt performed a step over step reciprocal pattern when ascending and descending. When descending, the pt progressed sideways.      Balance  Posture: Good  Sitting - Static: Good  Sitting - Dynamic: Good  Standing - Static: Good  Standing - Dynamic: Fair;+  Exercises  Hip Flexion: seated hip marches x 10 BLE  Knee Long Arc Quad: x 10 BLE  Ankle Pumps: x 10 BLE     AM-PAC Score  AM-PAC Inpatient Mobility Raw Score : 21 (06/30/21 1507)  AM-PAC Inpatient T-Scale Score : 50.25 (06/30/21 1507)  Mobility Inpatient CMS 0-100% Score: 28.97 (06/30/21 1507)  Mobility Inpatient CMS G-Code Modifier : CJ (06/30/21 1507)    Goals  Short term goals  Time Frame for Short term goals: 1 week (7/6/21)  Short term goal 1: Pt will ambulate >250 ft independently without an AD in order to increased independence in the home  Short term goal 2: Pt will transfer to/from chair, toilet, bed independently without an AD in order to increase mobility and independence  Short term goal 3: Pt will verbalize sternal precautions independently and appropriately follow them during a session in order to ensure safety by 7/2/21  Short term goal 4: Pt will ascend/descend 4 steps without railings requiring SBA in order to gait access to home  Patient Goals   Patient goals : \"I want to return home\"    Plan    Plan  Times per week: 5-7 x/wk  Times per day: Daily  Plan weeks: 1 week (6/29/21)  Specific instructions for Next Treatment: increased mobility, stairs  Current Treatment Recommendations: Strengthening, Home Exercise Program, ROM, Safety Education & Training, Balance Training, Endurance Training, Functional Mobility Training, Transfer Training, Gait Training, Stair training, Pain Management  Safety Devices  Type of devices: All fall risk precautions in place, Call light within reach, Gait belt, Left in chair, Nurse notified     Therapy Time   Individual Concurrent Group Co-treatment   Time In 1400         Time Out 1424         Minutes 24         Timed Code Treatment Minutes: 24 Minutes       FELIPE Gordon  If pt is unable to be seen after this session, please let this note serve as discharge summary. Please see case management note for discharge disposition. Thank you. This session was completed by the student physical therapist with supervision from Manny Valencia, PT, DPT and documentation was reviewed.

## 2021-07-01 LAB
ANION GAP SERPL CALCULATED.3IONS-SCNC: 10 MMOL/L (ref 3–16)
BLOOD BANK DISPENSE STATUS: NORMAL
BLOOD BANK PRODUCT CODE: NORMAL
BPU ID: NORMAL
BUN BLDV-MCNC: 17 MG/DL (ref 7–20)
CALCIUM SERPL-MCNC: 8.9 MG/DL (ref 8.3–10.6)
CHLORIDE BLD-SCNC: 102 MMOL/L (ref 99–110)
CO2: 22 MMOL/L (ref 21–32)
CREAT SERPL-MCNC: 0.9 MG/DL (ref 0.9–1.3)
DESCRIPTION BLOOD BANK: NORMAL
GFR AFRICAN AMERICAN: >60
GFR NON-AFRICAN AMERICAN: >60
GLUCOSE BLD-MCNC: 110 MG/DL (ref 70–99)
HCT VFR BLD CALC: 32.3 % (ref 40.5–52.5)
HEMOGLOBIN: 11.5 G/DL (ref 13.5–17.5)
MAGNESIUM: 2.3 MG/DL (ref 1.8–2.4)
MCH RBC QN AUTO: 32.9 PG (ref 26–34)
MCHC RBC AUTO-ENTMCNC: 35.6 G/DL (ref 31–36)
MCV RBC AUTO: 92.3 FL (ref 80–100)
PDW BLD-RTO: 12.5 % (ref 12.4–15.4)
PLATELET # BLD: 152 K/UL (ref 135–450)
PMV BLD AUTO: 9.3 FL (ref 5–10.5)
POC ACT LR: 176 SEC
POC ACT LR: >400 SEC
POTASSIUM SERPL-SCNC: 4.2 MMOL/L (ref 3.5–5.1)
RBC # BLD: 3.5 M/UL (ref 4.2–5.9)
SODIUM BLD-SCNC: 134 MMOL/L (ref 136–145)
WBC # BLD: 14 K/UL (ref 4–11)

## 2021-07-01 PROCEDURE — 99024 POSTOP FOLLOW-UP VISIT: CPT | Performed by: NURSE PRACTITIONER

## 2021-07-01 PROCEDURE — 80048 BASIC METABOLIC PNL TOTAL CA: CPT

## 2021-07-01 PROCEDURE — 6370000000 HC RX 637 (ALT 250 FOR IP): Performed by: NURSE PRACTITIONER

## 2021-07-01 PROCEDURE — 85027 COMPLETE CBC AUTOMATED: CPT

## 2021-07-01 PROCEDURE — 6360000002 HC RX W HCPCS: Performed by: THORACIC SURGERY (CARDIOTHORACIC VASCULAR SURGERY)

## 2021-07-01 PROCEDURE — 93005 ELECTROCARDIOGRAM TRACING: CPT | Performed by: THORACIC SURGERY (CARDIOTHORACIC VASCULAR SURGERY)

## 2021-07-01 PROCEDURE — 6370000000 HC RX 637 (ALT 250 FOR IP): Performed by: THORACIC SURGERY (CARDIOTHORACIC VASCULAR SURGERY)

## 2021-07-01 PROCEDURE — 2580000003 HC RX 258: Performed by: THORACIC SURGERY (CARDIOTHORACIC VASCULAR SURGERY)

## 2021-07-01 PROCEDURE — 83735 ASSAY OF MAGNESIUM: CPT

## 2021-07-01 PROCEDURE — 2140000000 HC CCU INTERMEDIATE R&B

## 2021-07-01 RX ORDER — LOSARTAN POTASSIUM 25 MG/1
25 TABLET ORAL DAILY
Status: DISCONTINUED | OUTPATIENT
Start: 2021-07-01 | End: 2021-07-02 | Stop reason: HOSPADM

## 2021-07-01 RX ORDER — FUROSEMIDE 20 MG/1
20 TABLET ORAL 2 TIMES DAILY
Status: DISCONTINUED | OUTPATIENT
Start: 2021-07-01 | End: 2021-07-02

## 2021-07-01 RX ORDER — POTASSIUM CHLORIDE 20 MEQ/1
20 TABLET, EXTENDED RELEASE ORAL 2 TIMES DAILY
Status: DISCONTINUED | OUTPATIENT
Start: 2021-07-01 | End: 2021-07-02 | Stop reason: HOSPADM

## 2021-07-01 RX ORDER — METOPROLOL TARTRATE 50 MG/1
50 TABLET, FILM COATED ORAL 2 TIMES DAILY
Status: DISCONTINUED | OUTPATIENT
Start: 2021-07-01 | End: 2021-07-02 | Stop reason: HOSPADM

## 2021-07-01 RX ADMIN — Medication 100 MG: at 08:42

## 2021-07-01 RX ADMIN — LOSARTAN POTASSIUM 25 MG: 25 TABLET, FILM COATED ORAL at 08:41

## 2021-07-01 RX ADMIN — FAMOTIDINE 20 MG: 20 TABLET ORAL at 20:58

## 2021-07-01 RX ADMIN — MUPIROCIN: 20 OINTMENT TOPICAL at 20:58

## 2021-07-01 RX ADMIN — ASPIRIN 81 MG: 81 TABLET, COATED ORAL at 08:44

## 2021-07-01 RX ADMIN — Medication 10 ML: at 09:24

## 2021-07-01 RX ADMIN — Medication 10 ML: at 08:49

## 2021-07-01 RX ADMIN — SODIUM CHLORIDE, PRESERVATIVE FREE 10 ML: 5 INJECTION INTRAVENOUS at 20:59

## 2021-07-01 RX ADMIN — Medication 15 ML: at 08:51

## 2021-07-01 RX ADMIN — POLYETHYLENE GLYCOL 3350 17 G: 17 POWDER, FOR SOLUTION ORAL at 08:42

## 2021-07-01 RX ADMIN — Medication 10 ML: at 08:50

## 2021-07-01 RX ADMIN — MULTIPLE VITAMINS W/ MINERALS TAB 1 TABLET: TAB at 08:42

## 2021-07-01 RX ADMIN — Medication 15 ML: at 20:50

## 2021-07-01 RX ADMIN — Medication 10 ML: at 08:48

## 2021-07-01 RX ADMIN — ATORVASTATIN CALCIUM 80 MG: 80 TABLET, FILM COATED ORAL at 20:58

## 2021-07-01 RX ADMIN — SODIUM CHLORIDE, PRESERVATIVE FREE 10 ML: 5 INJECTION INTRAVENOUS at 08:48

## 2021-07-01 RX ADMIN — POTASSIUM CHLORIDE 20 MEQ: 1500 TABLET, EXTENDED RELEASE ORAL at 08:42

## 2021-07-01 RX ADMIN — MORPHINE SULFATE 4 MG: 4 INJECTION, SOLUTION INTRAMUSCULAR; INTRAVENOUS at 09:23

## 2021-07-01 RX ADMIN — MAGNESIUM GLUCONATE 500 MG ORAL TABLET 400 MG: 500 TABLET ORAL at 08:42

## 2021-07-01 RX ADMIN — FAMOTIDINE 20 MG: 20 TABLET ORAL at 08:43

## 2021-07-01 RX ADMIN — CLOPIDOGREL BISULFATE 75 MG: 75 TABLET ORAL at 08:44

## 2021-07-01 RX ADMIN — FONDAPARINUX SODIUM 2.5 MG: 2.5 INJECTION, SOLUTION SUBCUTANEOUS at 08:45

## 2021-07-01 RX ADMIN — MUPIROCIN 1 EACH: 20 OINTMENT TOPICAL at 08:51

## 2021-07-01 RX ADMIN — ACETAMINOPHEN 650 MG: 325 TABLET ORAL at 02:53

## 2021-07-01 RX ADMIN — FUROSEMIDE 40 MG: 10 INJECTION, SOLUTION INTRAMUSCULAR; INTRAVENOUS at 08:41

## 2021-07-01 RX ADMIN — METOPROLOL TARTRATE 50 MG: 50 TABLET, FILM COATED ORAL at 08:42

## 2021-07-01 RX ADMIN — FUROSEMIDE 20 MG: 20 TABLET ORAL at 17:17

## 2021-07-01 RX ADMIN — BISACODYL 5 MG: 5 TABLET, COATED ORAL at 08:42

## 2021-07-01 RX ADMIN — METOPROLOL TARTRATE 50 MG: 50 TABLET, FILM COATED ORAL at 20:58

## 2021-07-01 RX ADMIN — POTASSIUM CHLORIDE 20 MEQ: 1500 TABLET, EXTENDED RELEASE ORAL at 20:58

## 2021-07-01 ASSESSMENT — PAIN DESCRIPTION - ORIENTATION
ORIENTATION: RIGHT;LEFT
ORIENTATION: MID

## 2021-07-01 ASSESSMENT — PAIN DESCRIPTION - PAIN TYPE
TYPE: SURGICAL PAIN
TYPE: SURGICAL PAIN

## 2021-07-01 ASSESSMENT — PAIN SCALES - GENERAL
PAINLEVEL_OUTOF10: 5
PAINLEVEL_OUTOF10: 0
PAINLEVEL_OUTOF10: 5
PAINLEVEL_OUTOF10: 7
PAINLEVEL_OUTOF10: 0
PAINLEVEL_OUTOF10: 3

## 2021-07-01 ASSESSMENT — PAIN DESCRIPTION - ONSET: ONSET: ON-GOING

## 2021-07-01 ASSESSMENT — PAIN - FUNCTIONAL ASSESSMENT: PAIN_FUNCTIONAL_ASSESSMENT: PREVENTS OR INTERFERES SOME ACTIVE ACTIVITIES AND ADLS

## 2021-07-01 ASSESSMENT — PAIN DESCRIPTION - FREQUENCY: FREQUENCY: CONTINUOUS

## 2021-07-01 ASSESSMENT — PAIN DESCRIPTION - LOCATION
LOCATION: RIB CAGE
LOCATION: CHEST

## 2021-07-01 ASSESSMENT — PAIN DESCRIPTION - PROGRESSION
CLINICAL_PROGRESSION: GRADUALLY WORSENING

## 2021-07-01 ASSESSMENT — PAIN DESCRIPTION - DESCRIPTORS: DESCRIPTORS: SHARP

## 2021-07-01 NOTE — PROGRESS NOTES
Tolerated shower. Tired now. Back in bed. Demonstrated how to don one Jason hose and then instructed wife how to don the other anti emboli stocking. She then demonstrated understanding and placed the other sock on. Reviewed medications that patient most likely will be discharged with. Discussed activity. Patient able to get in and out of bed independently using proper sternal precautions.

## 2021-07-01 NOTE — PROGRESS NOTES
Occupational Therapy/Physical Therapy    OT/PT follow-up attempted this afternoon, although RN reports pt just had a shower and returned to bed for a nap. RN requests that therapy allow pt to rest at this time. Will continue to follow and re-attempt as schedule permits. Thank you.     HUGO Silva/ROSA English 2  PT, [place] #[id number]

## 2021-07-01 NOTE — PROGRESS NOTES
07/01/21 0910   RT Protocol   Smoking Status 1   Surgical status 3   Xray 0.0  (last xray 6/28)   Respiratory pattern 0   Mental Status 0   Breath sounds 0   Cough 0   Activity level 1   Oxygen Requirement 0   Indications for Bronchodilator Therapy None   Bronchodilator Assessment Score 1   Indications for Bronchial Hygiene None   Bronchial Hygiene Assessment Score 2   Indications for Volume Expansion None   Volume Expansion Assessment Score 4

## 2021-07-01 NOTE — PROGRESS NOTES
CVTS Cardiothoracic Progress Note:                                CC:  Post op follow up     Surgery: 6/28/21 Urgent coronary bypass grafting surgery x4 with sequential greater saphenous vein graft to the ramus intermediate branch onto the obtuse marginal branch of circumflex, separate single greater saphenous vein graft to the posterior ventricular branch of the right coronary artery, pedicled left internal artery to the LAD. Left atrial appendage obliteration with 35-mm AtriCure left atrial clip. Cardiopulmonary bypass. Endoscopic vein harvest of right greater saphenous vein. Transesophageal echo. Epiaortic ultrasound. Doppler verification of grafts. Bilateral five-level intercostal nerve block with Exparel. Platelet gel application. Removal of preoperatively placed intraaortic balloon pump. Hospital course:   6/29 Up in chair, easily conversational, in NAD. Remains in SR.   6/30 Up in chair in NAD. SR.   7/1 Sitting up in chair, in nad. Hemodynamically stable, in SR.        Past Medical History:   Diagnosis Date    Alcohol abuse 6/26/2021    Back pain     Class 1 obesity in adult 6/26/2021    Coronary artery disease involving native coronary artery of native heart with unstable angina pectoris ([address]     Gastroesophageal reflux disease without esophagitis 12/18/2015    Hypertension     Osteoarthritis         Past Surgical History:   Procedure Laterality Date    CORONARY ARTERY BYPASS GRAFT N/A 6/28/2021    CABG CORONARY ARTERY BYPASS GRAFT X4, INTERNAL MAMMARY ARTERY, SAPHENOUS VEIN GRAFT AND LEFT ATRIAL APPENDAGE CLIP, ON PUMP WITH BILATERAL 5 LEVEL INTERCOSTAL NERVE BLOCK, PLATELET GEL APPLICATION, AND REMOVAL OF INTRA  AORTIC BALLOON PUMP performed by Myles Robles MD at [facility] as of 06/26/2021 - Fully Reviewed 06/26/2021   Allergen Reaction Noted    Penicillins Other (See Comments) 11/20/2012        Patient Active Problem List   Diagnosis    Thoracic back pain    Tobacco use    Mechanical back pain    Gastroesophageal reflux disease without esophagitis    Snoring    Shortness of breath    Chondromalacia patellae of left knee    Left knee pain    ST elevation myocardial infarction (STEMI) (HCC)    STEMI ([facility] elevation myocardial infarction) ([facility]    Essential hypertension    Alcohol abuse    Class 1 obesity in adult    Observed sleep apnea    Ischemic cardiomyopathy    Elevated LFTs    Coronary artery disease involving native coronary artery of native heart with unstable angina pectoris (HCC)        Vital Signs: BP (!) 144/99   Pulse 100   Temp 98.2 °F (36.8 °C) (Oral)   Resp 18   Ht 5' 9\" (1.753 m)   Wt 196 lb 1.6 oz (89 kg)   SpO2 99%   BMI 28.96 kg/m²  O2 Flow Rate (L/min): 1 L/min     Admission Weight: Weight: 220 lb 10.9 oz (100.1 kg)    Weight on 6/28 (94.2 kg) Pre-op   Weight on 6/29 (98.7 kg)   6/30  91.9 kg  7/1 89 kg     Intake/Output:     Intake/Output Summary (Last 24 hours) at 7/1/2021 0937  Last data filed at 7/1/2021 0848  Gross per 24 hour   Intake 130 ml   Output 2570 ml   Net -2440 ml      Extubation Time: 6/28 at 13:13  Transition Time: 6/29 at 08:54     LABORATORY DATA:   CBC:   Recent Labs     06/29/21  0406 06/30/21  0614 07/01/21  0600   WBC 16.1* 14.6* 14.0*   HGB 12.2* 11.4* 11.5*   HCT 35.0* 31.8* 32.3*   MCV 93.4 92.2 92.3   * 122* 152     BMP:   Recent Labs     06/29/21  0406 06/30/21  0614 07/01/21  0600   * 133* 134*   K 4.4 4.3 4.2    101 102   CO2 20* 25 22   BUN 14 18 17   CREATININE 0.9 1.0 0.9     MG:    Recent Labs     06/29/21  0406 06/30/21  0614 07/01/21  0600   MG 2.20 2.10 2.30      PT/INR:   Recent Labs     06/28/21  1459 06/29/21  0406   PROTIME 14.9* 14.6*   INR 1.28* 1.26*     APTT:   Recent Labs     06/28/21  1459   APTT 30.7     CXR: 6/28/21      FINDINGS:   Status post median sternotomy.  Heart size stable.  Left chest tube in place.    No pneumothorax.  Bibasilar hypoaeration.           Impression Status post median sternotomy.  No pneumothorax     _________________________________________________________  Subjective:   Dietary Intake: improving   no Nausea   Pain Control: adequate   Complaints: mild post op pain  Bowels: +BM     Objective:   General appearance: sitting up in chair, in nad   Lungs: diminished bilateral bases   Heart: S1S2 normal; SR on monitor  Chest: symmetrical expansion with inspiration and expirations; no rocking of sternum noted   Abdomen: soft, non-tender  Bowel sounds: normoactive  Kidneys: UOP [id number]=3325 ml in 24 hrs; Cr 0.9  Wound/Incisions: Midsternal incision CDI; RLE incisions with small amount of dried serosanguinous drainage on dressing; Pacing wires out 6/30  Extremities: BLE pulses palpable; trace BLE edema   Neurological: alert, oriented and grossly non-focal   Chest tubes/Drains: Chest tube # 1 with 83-0-58=421 ml serosanguinous drainage in 24 hours overnight; Chest tube # 2 with 40-10-40=90 ml serosanguinous drainage in 24 hours overnight; no airleaks noted in either tube     Assessment:   Post-op: 3 days. Condition: In stable condition. Plan:   1. Cardiovascular: S/p CABG x 4 with left atrial appendage clip (6/28, Dr. Jagdeep Bryant)   BB, ASA, Statin, Plavix   Stable in SR   BB increased this morning, ARB added     2. Pulmonary:   Saturating well on RA   Continue expansion measures- IS, acapella, OOBTC, ambulation    3. Neurology:   Hx of ETOH abuse - reports drinking 12+ beers/day to RN. Can continue to have 1 beer with his lunch and dinner trays. No evidence of withdrawal at this time     4. Nephrology:   Diurese as tolerated- weight trending down, will transition to PO lasix     5. Endocrinology:   Glucose stable  No hx DM     6. Hematology:   Acute blood loss anemia- H&H stable     7. Microbiology:   Leukocytosis- WBC 14 on AM labs, trending down. Likely reactive. Afebrile. Needs expansion. Monitor     8.  Nutrition:   Appetite slowly improving, continue diet as ordered     9. Labs:   Labs and imaging reviewed as above    10. Post-op Drains/Wires: TPWs and KARLEE out 6/30. Removing chest tubes today. 11. D/C Goals: DCP following. PT/OT recommending home with 24 hour supervision/assist. Will likely d/c home tomorrow. 12. Continue post-op care of patient in the ICU    DVT prophylaxis: arixtra  GI prophylaxis: pepcid     Meds:    The patient is on a beta-blocker   The patient is on an ace-i/ARB   The patient is on a statin   The patient is on oral antiplatelet therapy   __________________________________________________________    LULU Chu - CNP  7/1/2021  9:37 AM  Note reviewed, events of last 24 hours reviewed along with vital signs and I/Os and patient examined. Assessment and plans discussed and are as outlined above.      Suki Jimenez MD, FACS, [facility] [place], FACCP, [place]

## 2021-07-01 NOTE — PROGRESS NOTES
Chest tubes meet criteria to remove per open heart protocol. If chest tubes do not meet criteria, a specific order has been entered to remove. no air leak. no crepitus. Pt instructed on procedure. Pt premedicated with 4 mg morphine IVP x 1. Site cleansed and prepped per protocol. Chest tubes X 2 removed without difficulty. Steri strips, dry sterile dressing and vaseline gauze applied. Bilateral breath sounds audible and clear. O2Sats 95% on room air. Incision site within normal limits. Patient tolerated well.

## 2021-07-01 NOTE — PROGRESS NOTES
Tolerated shower. CT dressing and right leg dressings changed. Back in bed for rest.  Will walk again later after nap. Reviewed with patient and wife home meds, how to care for incision lines, activity, diet, when to call MD. Verbalized understanding.

## 2021-07-01 NOTE — PROGRESS NOTES
Right IJ TLC discontinued. Manual pressure x 10 minutes. B/P 103/67. Up to shower,  Wife assisting with bath.

## 2021-07-02 VITALS
HEIGHT: 69 IN | SYSTOLIC BLOOD PRESSURE: 119 MMHG | TEMPERATURE: 98.4 F | HEART RATE: 89 BPM | OXYGEN SATURATION: 98 % | BODY MASS INDEX: 28.23 KG/M2 | DIASTOLIC BLOOD PRESSURE: 79 MMHG | WEIGHT: 190.6 LBS | RESPIRATION RATE: 16 BRPM

## 2021-07-02 LAB
ANION GAP SERPL CALCULATED.3IONS-SCNC: 9 MMOL/L (ref 3–16)
BUN BLDV-MCNC: 23 MG/DL (ref 7–20)
CALCIUM SERPL-MCNC: 9.1 MG/DL (ref 8.3–10.6)
CHLORIDE BLD-SCNC: 101 MMOL/L (ref 99–110)
CO2: 25 MMOL/L (ref 21–32)
CREAT SERPL-MCNC: 1.1 MG/DL (ref 0.9–1.3)
EKG ATRIAL RATE: 99 BPM
EKG DIAGNOSIS: NORMAL
EKG P AXIS: 27 DEGREES
EKG P-R INTERVAL: 116 MS
EKG Q-T INTERVAL: 334 MS
EKG QRS DURATION: 80 MS
EKG QTC CALCULATION (BAZETT): 428 MS
EKG R AXIS: 27 DEGREES
EKG T AXIS: 32 DEGREES
EKG VENTRICULAR RATE: 99 BPM
GFR AFRICAN AMERICAN: >60
GFR NON-AFRICAN AMERICAN: >60
GLUCOSE BLD-MCNC: 125 MG/DL (ref 70–99)
HCT VFR BLD CALC: 33.4 % (ref 40.5–52.5)
HEMOGLOBIN: 11.7 G/DL (ref 13.5–17.5)
MAGNESIUM: 2.4 MG/DL (ref 1.8–2.4)
MCH RBC QN AUTO: 32.2 PG (ref 26–34)
MCHC RBC AUTO-ENTMCNC: 35 G/DL (ref 31–36)
MCV RBC AUTO: 92 FL (ref 80–100)
PDW BLD-RTO: 12.7 % (ref 12.4–15.4)
PLATELET # BLD: 229 K/UL (ref 135–450)
PMV BLD AUTO: 9.1 FL (ref 5–10.5)
POTASSIUM SERPL-SCNC: 4.5 MMOL/L (ref 3.5–5.1)
RBC # BLD: 3.63 M/UL (ref 4.2–5.9)
SODIUM BLD-SCNC: 135 MMOL/L (ref 136–145)
WBC # BLD: 13 K/UL (ref 4–11)

## 2021-07-02 PROCEDURE — 6360000002 HC RX W HCPCS: Performed by: THORACIC SURGERY (CARDIOTHORACIC VASCULAR SURGERY)

## 2021-07-02 PROCEDURE — 6370000000 HC RX 637 (ALT 250 FOR IP): Performed by: NURSE PRACTITIONER

## 2021-07-02 PROCEDURE — 83735 ASSAY OF MAGNESIUM: CPT

## 2021-07-02 PROCEDURE — 93010 ELECTROCARDIOGRAM REPORT: CPT | Performed by: INTERNAL MEDICINE

## 2021-07-02 PROCEDURE — 6370000000 HC RX 637 (ALT 250 FOR IP): Performed by: THORACIC SURGERY (CARDIOTHORACIC VASCULAR SURGERY)

## 2021-07-02 PROCEDURE — 80048 BASIC METABOLIC PNL TOTAL CA: CPT

## 2021-07-02 PROCEDURE — 99024 POSTOP FOLLOW-UP VISIT: CPT | Performed by: NURSE PRACTITIONER

## 2021-07-02 PROCEDURE — 36415 COLL VENOUS BLD VENIPUNCTURE: CPT

## 2021-07-02 PROCEDURE — 85027 COMPLETE CBC AUTOMATED: CPT

## 2021-07-02 PROCEDURE — 2580000003 HC RX 258: Performed by: THORACIC SURGERY (CARDIOTHORACIC VASCULAR SURGERY)

## 2021-07-02 RX ORDER — ASPIRIN 81 MG/1
81 TABLET ORAL DAILY
Qty: 30 TABLET | Refills: 0 | Status: SHIPPED | OUTPATIENT
Start: 2021-07-03 | End: 2022-06-28 | Stop reason: SDUPTHER

## 2021-07-02 RX ORDER — LANOLIN ALCOHOL/MO/W.PET/CERES
100 CREAM (GRAM) TOPICAL DAILY
Qty: 30 TABLET | Refills: 0 | Status: SHIPPED | OUTPATIENT
Start: 2021-07-03 | End: 2021-08-10

## 2021-07-02 RX ORDER — METOPROLOL TARTRATE 50 MG/1
50 TABLET, FILM COATED ORAL 2 TIMES DAILY
Qty: 60 TABLET | Refills: 0 | Status: SHIPPED | OUTPATIENT
Start: 2021-07-02 | End: 2021-07-29 | Stop reason: SDUPTHER

## 2021-07-02 RX ORDER — POTASSIUM CHLORIDE 20 MEQ/1
10 TABLET, EXTENDED RELEASE ORAL DAILY
Qty: 2.5 TABLET | Refills: 0 | Status: SHIPPED | OUTPATIENT
Start: 2021-07-02 | End: 2021-07-13 | Stop reason: ALTCHOICE

## 2021-07-02 RX ORDER — CLOPIDOGREL BISULFATE 75 MG/1
75 TABLET ORAL DAILY
Qty: 30 TABLET | Refills: 0 | Status: SHIPPED | OUTPATIENT
Start: 2021-07-03 | End: 2021-07-29 | Stop reason: SDUPTHER

## 2021-07-02 RX ORDER — FUROSEMIDE 20 MG/1
20 TABLET ORAL DAILY
Qty: 5 TABLET | Refills: 0 | Status: SHIPPED | OUTPATIENT
Start: 2021-07-03 | End: 2021-07-13 | Stop reason: ALTCHOICE

## 2021-07-02 RX ORDER — FUROSEMIDE 20 MG/1
20 TABLET ORAL ONCE
Status: COMPLETED | OUTPATIENT
Start: 2021-07-02 | End: 2021-07-02

## 2021-07-02 RX ORDER — FUROSEMIDE 20 MG/1
20 TABLET ORAL DAILY
Status: DISCONTINUED | OUTPATIENT
Start: 2021-07-03 | End: 2021-07-02 | Stop reason: HOSPADM

## 2021-07-02 RX ORDER — ATORVASTATIN CALCIUM 80 MG/1
80 TABLET, FILM COATED ORAL NIGHTLY
Qty: 30 TABLET | Refills: 0 | Status: SHIPPED | OUTPATIENT
Start: 2021-07-02 | End: 2021-07-29 | Stop reason: SDUPTHER

## 2021-07-02 RX ORDER — M-VIT,TX,IRON,MINS/CALC/FOLIC 27MG-0.4MG
1 TABLET ORAL DAILY
Qty: 30 TABLET | Refills: 0 | Status: SHIPPED | OUTPATIENT
Start: 2021-07-03

## 2021-07-02 RX ORDER — OXYCODONE HYDROCHLORIDE 5 MG/1
5 TABLET ORAL EVERY 6 HOURS PRN
Qty: 28 TABLET | Refills: 0 | Status: SHIPPED | OUTPATIENT
Start: 2021-07-02 | End: 2021-07-09

## 2021-07-02 RX ORDER — LOSARTAN POTASSIUM 25 MG/1
25 TABLET ORAL DAILY
Qty: 30 TABLET | Refills: 0 | Status: SHIPPED | OUTPATIENT
Start: 2021-07-03 | End: 2021-07-29 | Stop reason: SDUPTHER

## 2021-07-02 RX ORDER — FAMOTIDINE 20 MG/1
20 TABLET, FILM COATED ORAL DAILY
Qty: 30 TABLET | Refills: 0 | Status: SHIPPED | OUTPATIENT
Start: 2021-07-02 | End: 2021-08-10 | Stop reason: ALTCHOICE

## 2021-07-02 RX ADMIN — BISACODYL 5 MG: 5 TABLET, COATED ORAL at 09:12

## 2021-07-02 RX ADMIN — LOSARTAN POTASSIUM 25 MG: 25 TABLET, FILM COATED ORAL at 09:12

## 2021-07-02 RX ADMIN — FONDAPARINUX SODIUM 2.5 MG: 2.5 INJECTION, SOLUTION SUBCUTANEOUS at 09:17

## 2021-07-02 RX ADMIN — POTASSIUM CHLORIDE 20 MEQ: 1500 TABLET, EXTENDED RELEASE ORAL at 09:12

## 2021-07-02 RX ADMIN — Medication 15 ML: at 09:19

## 2021-07-02 RX ADMIN — ASPIRIN 81 MG: 81 TABLET, COATED ORAL at 09:12

## 2021-07-02 RX ADMIN — POLYETHYLENE GLYCOL 3350 17 G: 17 POWDER, FOR SOLUTION ORAL at 09:14

## 2021-07-02 RX ADMIN — Medication 100 MG: at 09:12

## 2021-07-02 RX ADMIN — FUROSEMIDE 20 MG: 20 TABLET ORAL at 10:08

## 2021-07-02 RX ADMIN — METOPROLOL TARTRATE 50 MG: 50 TABLET, FILM COATED ORAL at 09:12

## 2021-07-02 RX ADMIN — FAMOTIDINE 20 MG: 20 TABLET ORAL at 09:12

## 2021-07-02 RX ADMIN — MULTIPLE VITAMINS W/ MINERALS TAB 1 TABLET: TAB at 09:12

## 2021-07-02 RX ADMIN — MUPIROCIN: 20 OINTMENT TOPICAL at 09:17

## 2021-07-02 RX ADMIN — SODIUM CHLORIDE, PRESERVATIVE FREE 10 ML: 5 INJECTION INTRAVENOUS at 09:17

## 2021-07-02 RX ADMIN — CLOPIDOGREL BISULFATE 75 MG: 75 TABLET ORAL at 09:12

## 2021-07-02 NOTE — PROGRESS NOTES
Pt with orders to be discharged home w/ home care. AVS printed and provided to pt and spouse. PIVs removed per protocol. Pt transported to main entrance of hospital by RN via wheelchair. Pt boarded into spouse's vehicle and discharged home.

## 2021-07-02 NOTE — DISCHARGE INSTR - COC
Continuity of Care Form    Patient Name: Marlene Kimbrough   :  1975  MRN:  [id number]    Admit date:  2021  Discharge date:  2021    Code Status Order: Full Code   Advance Directives:   Advance Care Flowsheet Documentation       Date/Time Healthcare Directive Type of Healthcare Directive Copy in [address] [place] [address] [address] Agent's Name Healthcare Agent's Phone Number    21 1613  No, patient does not have an advance directive for healthcare treatment -- -- -- -- --            Admitting Physician:  Bronson Mulligan MD  PCP: Juan Francis APRN - CNP    Discharging Nurse: [facility] Unit/Room#: 0226/0226-01  Discharging Unit Phone Number: [phone]    Emergency Contact:   Extended Emergency Contact Information  Primary Emergency Contact: Tennille Rojas  Address: [address] Phone: [phone]  Relation: Spouse    Past Surgical History:  Past Surgical History:   Procedure Laterality Date    CORONARY ARTERY BYPASS GRAFT N/A 2021    CABG CORONARY ARTERY BYPASS GRAFT X4, INTERNAL MAMMARY ARTERY, SAPHENOUS VEIN GRAFT AND LEFT ATRIAL APPENDAGE CLIP, ON PUMP WITH BILATERAL 5 LEVEL INTERCOSTAL NERVE BLOCK, PLATELET GEL APPLICATION, AND REMOVAL OF INTRA  AORTIC BALLOON PUMP performed by Bronson Mulligan MD at [address]       Immunization History:   Immunization History   Administered Date(s) Administered    Tdap (Boostrix, Adacel) 2015       Active Problems:  Patient Active Problem List   Diagnosis Code    Thoracic back pain M54.6    Tobacco use Z72.0    Mechanical back pain M54.9    Gastroesophageal reflux disease without esophagitis K21.9    Snoring R06.83    Shortness of breath R06.02    Chondromalacia patellae of left knee M22.42    Left knee pain M25.562    ST elevation myocardial infarction (STEMI) ([facility] I21.3    STEMI (ST elevation myocardial infarction) ([facility] [facility] I21.3    Essential hypertension I10    Alcohol abuse F10.10    Class 1 obesity in adult E66.9    Observed sleep apnea G47.30    Ischemic cardiomyopathy I25.5    Elevated LFTs R79.89    Coronary artery disease involving native coronary artery of native heart with unstable angina pectoris (HCC) I25.110       Isolation/Infection:   Isolation            No Isolation          Patient Infection Status       None to display            Nurse Assessment:  Last Vital Signs: /79   Pulse 97   Temp 98.4 °F (36.9 °C) (Axillary)   Resp 16   Ht 5' 9\" (1.753 m)   Wt 190 lb 9.6 oz (86.5 kg)   SpO2 98%   BMI 28.15 kg/m²     Last documented pain score (0-10 scale): Pain Level: 0  Last Weight:   Wt Readings from Last 1 Encounters:   07/02/21 190 lb 9.6 oz (86.5 kg)     Mental Status: A/O x4    IV Access:  none    Nursing Mobility/ADLs:  Walking  SB assist  Transfer Ad dina  Bathing SB assist  Dressing SB assist  Toileting Ad dina  Feeding Ad dina  Med Admin W/ assist  Med Delivery  W/ assist    Wound Care Documentation and Therapy:  Puncture 06/26/21 Wrist Anterior;Right (Active)   Wound Assessment Clean;Dry; Intact 06/27/21 0400   Closure Other (Comment) 06/26/21 0500   Drainage Amount Scant 06/26/21 0500   Drainage Description Serosanguinous 06/26/21 0500   Dressing Status Clean;Dry; Intact 06/26/21 0500   Number of days: 6        Elimination:  Continence:   · Bowel: continent  · Bladder: continent  Urinary Catheter: N/A  Colostomy/Ileostomy/Ileal Conduit: N/A       Date of Last BM: 7/2/2021    Intake/Output Summary (Last 24 hours) at 7/2/2021 1131  Last data filed at 7/2/2021 0900  Gross per 24 hour   Intake 480 ml   Output --   Net 480 ml     I/O last 3 completed shifts: In: 450 [P.O.:440; I.V.:10]  Out: 1710 [Urine:1700;  Chest Tube:10]    Safety Concerns:     none    Impairments/Disabilities:      none    Nutrition Therapy:  Current Nutrition Therapy:   Regular diet    Routes of Feeding: PO   Liquids: thins  Daily

## 2021-07-02 NOTE — DISCHARGE SUMMARY
Cardiac, Vascular & Thoracic Surgery  Discharge Summary    Patient:  Flower Kirk 1975 [id number]   Admission Date:  6/26/2021  2:40 AM  Discharge Date:   07/02/21     Principle Diagnosis: Severe multivessel CAD     Secondary Diagnosis:  Active Problems:    Tobacco use    STEMI (ST elevation myocardial infarction) ([facility] [address]    Essential hypertension    Alcohol abuse    Class 1 obesity in adult    Observed sleep apnea    Ischemic cardiomyopathy    Elevated LFTs    Coronary artery disease involving native coronary artery of native heart with unstable angina pectoris ([facility] [address]  Resolved Problems:    * No resolved hospital problems. *      Cardiac Cath: 6/26/21 Dr. Lydia Gamboa     LVEDP  9   GRADIENT ACROSS AORTIC VALVE  none   LV FUNCTION EF 60%   WALL MOTION  normal   MITRAL REGURGITATION  mild      CORONARY ARTERIES     LM Long, large vessel, proximal-mid less than 10% stenosis, distal vessel has tapering with 50 to 60% stenosis.       LAD  Ostial/proximal 80% stenosis. Mid-distal 60% stenosis.     D1 is a small to medium size vessel with proximal-mid 75% stenosis.       LCX Markedly tortuous vessel particularly at ostium and in the proximal segments, there is 50 to 60% stenosis followed by mid-distal 99% stenosis with LAILA II flow.         RI  This vessel could also be described as a high OM1 with 40-50 stent proximal-mid stenosis.       RCA Dominant cough tortuous, calcified, proximal 50% stenosis, mid 50% stenosis, distal 90% stenosis.       TTE: 6/26/21 Dr. Rui Hidalgo   Summary   The left ventricular systolic function is mildly reduced with an ejection   fraction of 45 -50 %. There is hypokinesis of the inferolateral and anterolateral walls. Mild concentric left ventricular hypertrophy. Left ventricular diastolic filling pressure is normal.   Mild mitral regurgitation.     Procedure:  6/28 CABG CORONARY ARTERY BYPASS GRAFT X4, INTERNAL MAMMARY ARTERY, SAPHENOUS VEIN GRAFT AND LEFT ATRIAL APPENDAGE CLIP, ON PUMP WITH BILATERAL 5 LEVEL INTERCOSTAL NERVE BLOCK, PLATELET GEL APPLICATION, AND REMOVAL OF INTRA  AORTIC BALLOON PUMP    History:  The patient is a 41 y.o. male with history of HTN, osteoarthritis, GERD, and alcohol abuse who presented to [facility] with complaints of chest pain. Pain radiated to his jaw. Associated with diaphoresis. EKG showed inferolateral ST elevation. He was noted to be hypertensive with SBP >200. A STEMI alert was activated. He was given Brillinta and started on heparin gtt. The patient was transferred to [facility] for [address] [place] [address] which showed severe multivessel CAD. Successful PTA of the circumflex was performed as well as placement of IABP. We have been consulted for consideration of surgical revascularization. Hospital Course: The patient underwent CABG x 4, left atrial appendage clip and removal of IABP on 6/28 with Dr. Carol Kurtz. His post-operative course was uneventful. His pain was well controlled. He was ambulating without difficulty and participating in PT/OT. He was tolerating PO intake. The patient was discharged on 07/02/21 with [facility].      Discharged Condition: stable    Disposition:  home    Medications:  Aspirin:start  ADP Inhibitor (plavix/brillinta/effient):start  ACE/ARB:start  Betablocker:start  Statin:start    Discharge Medications:   Kranthi Molina   Home Medication Instructions [facility]:[id number]    Printed on:07/02/21 3298   Medication Information                      aspirin 81 MG EC tablet  Take 1 tablet by mouth daily             atorvastatin (LIPITOR) 80 MG tablet  Take 1 tablet by mouth nightly             bisacodyl (DULCOLAX) 5 MG EC tablet  Take 1 tablet by mouth daily for 7 days             clopidogrel (PLAVIX) 75 MG tablet  Take 1 tablet by mouth daily             famotidine (PEPCID) 20 MG tablet  Take 1 tablet by mouth daily             furosemide (LASIX) 20 MG tablet  Take 1 tablet by mouth daily for 5 days             losartan (COZAAR) 25 MG tablet  Take 1 tablet by

## 2021-07-02 NOTE — PLAN OF CARE
improve  Outcome: Completed  Goal: Ability to maintain adequate ventilation will improve  Description: Ability to maintain adequate ventilation will improve  Outcome: Completed     Problem: Pain:  Goal: Pain level will decrease  Description: Pain level will decrease  Outcome: Completed  Goal: Control of acute pain  Description: Control of acute pain  Outcome: Completed  Goal: Control of chronic pain  Description: Control of chronic pain  Outcome: Completed     Problem: Tissue Perfusion - Cardiopulmonary, Altered:  Goal: Absence of angina  Description: Absence of angina  Outcome: Completed  Goal: Hemodynamic stability will improve  Description: Hemodynamic stability will improve  Outcome: Completed  Goal: Will show no evidence of cardiac arrhythmias  Description: Will show no evidence of cardiac arrhythmias  Outcome: Completed     Problem: Tobacco Use:  Goal: Will participate in inpatient tobacco-use cessation counseling  Description: Will participate in inpatient tobacco-use cessation counseling  Outcome: Completed     Problem: Pain:  Goal: Pain level will decrease  Description: Pain level will decrease  Outcome: Completed  Goal: Control of acute pain  Description: Control of acute pain  Outcome: Completed  Goal: Control of chronic pain  Description: Control of chronic pain  Outcome: Completed

## 2021-07-02 NOTE — PROGRESS NOTES
CVTS Cardiothoracic Progress Note:                                CC:  Post op follow up     Surgery: 6/28/21 Urgent coronary bypass grafting surgery x4 with sequential greater saphenous vein graft to the ramus intermediate branch onto the obtuse marginal branch of circumflex, separate single greater saphenous vein graft to the posterior ventricular branch of the right coronary artery, pedicled left internal artery to the LAD. Left atrial appendage obliteration with 35-mm AtriCure left atrial clip. Cardiopulmonary bypass. Endoscopic vein harvest of right greater saphenous vein. Transesophageal echo. Epiaortic ultrasound. Doppler verification of grafts. Bilateral five-level intercostal nerve block with Exparel. Platelet gel application. Removal of preoperatively placed intraaortic balloon pump. Hospital course:   6/29 Up in chair, easily conversational, in NAD. Remains in SR.   6/30 Up in chair in NAD. SR.   7/1 Sitting up in chair, in nad. Hemodynamically stable, in SR.   7/2 Sleeping comfortably, in nad. Remains hemodynamically stable. SR on monitor.        Past Medical History:   Diagnosis Date    Alcohol abuse 6/26/2021    Back pain     Class 1 obesity in adult 6/26/2021    Coronary artery disease involving native coronary artery of native heart with unstable angina pectoris ([address]     Gastroesophageal reflux disease without esophagitis 12/18/2015    Hypertension     Osteoarthritis         Past Surgical History:   Procedure Laterality Date    CORONARY ARTERY BYPASS GRAFT N/A 6/28/2021    CABG CORONARY ARTERY BYPASS GRAFT X4, INTERNAL MAMMARY ARTERY, SAPHENOUS VEIN GRAFT AND LEFT ATRIAL APPENDAGE CLIP, ON PUMP WITH BILATERAL 5 LEVEL INTERCOSTAL NERVE BLOCK, PLATELET GEL APPLICATION, AND REMOVAL OF INTRA  AORTIC BALLOON PUMP performed by Wendy Tatum MD at [facility] as of 06/26/2021 - Fully Reviewed 06/26/2021   Allergen Reaction Noted    Penicillins Other (See Comments) 11/20/2012 Patient Active Problem List   Diagnosis    Thoracic back pain    Tobacco use    Mechanical back pain    Gastroesophageal reflux disease without esophagitis    Snoring    Shortness of breath    Chondromalacia patellae of left knee    Left knee pain    ST [facility] ([facility]    STEMI (ST elevation myocardial infarction) ([facility]    Essential hypertension    Alcohol abuse    Class 1 obesity in adult    Observed sleep apnea    Ischemic cardiomyopathy    Elevated LFTs    Coronary artery disease involving native coronary artery of native heart with unstable angina pectoris (HCC)        Vital Signs: /69   Pulse 105   Temp 98.4 °F (36.9 °C) (Oral)   Resp 16   Ht 5' 9\" (1.753 m)   Wt 196 lb 1.6 oz (89 kg)   SpO2 93%   BMI 28.96 kg/m²  O2 Flow Rate (L/min): 1 L/min     Admission Weight: Weight: 220 lb 10.9 oz (100.1 kg)    Weight on 6/28 (94.2 kg) Pre-op   Weight on 6/29 (98.7 kg)   6/30  91.9 kg  7/1 89 kg   7/2 86.6 kg    Intake/Output:     Intake/Output Summary (Last 24 hours) at 7/2/2021 0857  Last data filed at 7/1/2021 1831  Gross per 24 hour   Intake 440 ml   Output 1700 ml   Net -1260 ml      Extubation Time: 6/28 at 13:13  Transition Time: 6/29 at 08:54     LABORATORY DATA:   CBC:   Recent Labs     06/30/21  0614 07/01/21  0600 07/02/21  0408   WBC 14.6* 14.0* 13.0*   HGB 11.4* 11.5* 11.7*   HCT 31.8* 32.3* 33.4*   MCV 92.2 92.3 92.0   * 152 229     BMP:   Recent Labs     06/30/21  0614 07/01/21  0600 07/02/21  0408   * 134* 135*   K 4.3 4.2 4.5    102 101   CO2 25 22 25   BUN 18 17 23*   CREATININE 1.0 0.9 1.1     MG:    Recent Labs     06/30/21  0614 07/01/21  0600 07/02/21  0408   MG 2.10 2.30 2.40      CXR: 6/28/21      FINDINGS:   Status post median sternotomy.  Heart size stable.  Left chest tube in place.    No pneumothorax.  Bibasilar hypoaeration.           Impression   Status post median sternotomy.  No pneumothorax _________________________________________________________  Subjective:   Dietary Intake: improving   no Nausea   Pain Control: adequate   Complaints: mild post op pain  Bowels: +BM     Objective:   General appearance: resting comfortably, in nad   Lungs: ctab   Heart: S1S2 normal; SR on monitor  Chest: symmetrical expansion with inspiration and expirations; no rocking of sternum noted   Abdomen: soft, non-tender  Bowel sounds: normoactive  Kidneys: UOP 1700 ml in 24 hrs with 2 undocumented occurrences; Cr 1.1  Wound/Incisions: Midsternal incision CDI; RLE incisions with dressing CDI; Pacing wires out 6/30  Extremities: BLE pulses palpable; no lower extremity edema   Neurological: alert, oriented and grossly non-focal   Chest tubes/Drains: Chest tubes out 7/1    Assessment:   Post-op: 4 days. Condition: In stable condition. Plan:   1. Cardiovascular: S/p CABG x 4 with left atrial appendage clip (6/28, Dr. Tamra Mitchell)   BB, ARB, ASA, Statin, Plavix   Stable in SR     2. Pulmonary:   Saturating well on RA   Continue expansion measures- IS, acapella, OOBTC, ambulation    3. Neurology:   Hx of ETOH abuse - reports drinking 12+ beers/day to RN. Can continue to have 1 beer with his lunch and dinner trays. No evidence of withdrawal at this time     4. Nephrology:   Diurese as tolerated- weight trending down, PO Lasix decreased     5. Endocrinology:   Glucose stable  No hx DM     6. Hematology:   Acute blood loss anemia- H&H stable     7. Microbiology:   Leukocytosis- WBC 13 on AM labs, and continues to trend down. Likely reactive. Afebrile. Needs expansion. Monitor     8. Nutrition:   Appetite slowly improving, continue diet as ordered     9. Labs:   Labs and imaging reviewed as above    10. Post-op Drains/Wires: TPWs and KARLEE out 6/30. Chest tubes out 7/1     11. D/C Goals: DCP following. PT/OT recommending home with 24 hour supervision/assist. Plan for discharge home this afternoon.      12. Continue post-op care of patient in the ICU    DVT prophylaxis: arixtra  GI prophylaxis: pepcid     Meds:    The patient is on a beta-blocker   The patient is on an ace-i/ARB   The patient is on a statin   The patient is on oral antiplatelet therapy   __________________________________________________________    LULU Peter - CNP  7/2/2021  8:57 AM  Note reviewed, events of last 24 hours reviewed along with vital signs and I/Os and patient examined. Assessment and plans discussed and are as outlined above.      Chencho Lowery MD, FACS, [facility], FACCP, [place]

## 2021-07-02 NOTE — CONSULTS
Comprehensive Nutrition Assessment    Type and Reason for Visit:  Initial, Consult, Patient Education    Nutrition Recommendations/Plan:   1. Continue CC4 diet and encourage PO intake  2. Monitor nutrition adequacy, pertinent labs, bowel habits, wt changes, and clinical progress    Nutrition Assessment:  LOS assessment and consult for diet education: Pt s/p CABG on 6/28: Currently on CC4 diet with good PO intake and appetite, per chart review and pt. Ate % or breakfast today. Pt reports good PO intake and no weight loss PTA. No reported N/V. Declined ONS and declined education at this time. Provided pt with low sodium and post CABG nutrition education handouts. Pt reported that he would give the handouts to his wife and she would call or email with any questions. Will continue to monitor. Malnutrition Assessment:  Malnutrition Status: At risk for malnutrition (Comment)    Context:  Acute Illness     Findings of the 6 clinical characteristics of malnutrition:  Fluid Accumulation:  1 - Mild      Nutrition Related Findings:  Na low 135. Non pitting + 1 edema + BM today, on glycolax. Some wt loss since admission, pt is -5 L since admission which may contribute to weight loss. Wounds:  Surgical Incision (Surgicial incision on leg and sternum)       Current Nutrition Therapies:    ADULT DIET; Regular; 4 carb choices (60 gm/meal)    Anthropometric Measures:  · Height: 5' 9\" (175.3 cm)  · Current Body Weight: 190 lb (86.2 kg)   · Ideal Body Weight: 160 lbs; % Ideal Body Weight 118.8 %   · BMI: 28    · BMI Categories: Overweight (BMI 25.0-29. 9)       Nutrition Diagnosis:   · Increased nutrient needs related to increase demand for energy/nutrients as evidenced by other (comment) (s/p CABG and prolonged hospital stay)    Nutrition Interventions:   Food and/or Nutrient Delivery:  Continue Current Diet  Nutrition Education/Counseling:  Education declined   Coordination of Nutrition Care:  Continue to monitor while inpatient    Goals:  Consume 50% or greater of 3 meals per day       Nutrition Monitoring and Evaluation:   Behavioral-Environmental Outcomes:  None Identified   Food/Nutrient Intake Outcomes:  Food and Nutrient Intake  Physical Signs/Symptoms Outcomes:  Biochemical Data     Discharge Planning:    Continue current diet     Electronically signed by Joseph Snowden MS, RD, LD on 7/2/21 at 11:55 AM EDT    Contact: Office: [phone]; [address] [place] [address] Road: [phone]

## 2021-07-02 NOTE — CARE COORDINATION
CASE MANAGEMENT DISCHARGE SUMMARY      Discharge to: home with [facility] [place]    New Durable Medical Equipment ordered/agency:     Transportation: private   Family/car:  Family to transport home    Confirmed discharge plan with:   Patient: yes, pt discharged prior to being seen by [facility] aware of needs and will contact pt for service.  Wendi Chester RN

## 2021-07-02 NOTE — PLAN OF CARE
Problem: Falls - Risk of:  Goal: Will remain free from falls  Description: Will remain free from falls  Outcome: Met This Shift  Goal: Absence of physical injury  Description: Absence of physical injury  Outcome: Met This Shift     Problem: Discharge Planning:  Goal: Discharged to appropriate level of care  Description: Discharged to appropriate level of care  Outcome: Ongoing     Problem:  Activity Intolerance:  Goal: Able to perform prescribed physical activity  Description: Able to perform prescribed physical activity  Outcome: Ongoing

## 2021-07-06 ENCOUNTER — TELEPHONE (OUTPATIENT)
Dept: FAMILY MEDICINE CLINIC | Age: 46
End: 2021-07-06

## 2021-07-06 NOTE — TELEPHONE ENCOUNTER
[facility] Transitions Initial Follow Up Call    Outreach made within 2 business days of discharge: Yes    Patient: Keith Cordero Patient : 1975   MRN: [id number]  Reason for Admission: There are no discharge diagnoses documented for the most recent discharge. Discharge Date: 21       Spoke with: patient     Discharge department/facility: [facility]    TCM Interactive Patient Contact:  Was patient able to fill all prescriptions: Yes  Was patient instructed to bring all medications to the follow-up visit: Yes  Is patient taking all medications as directed in the discharge summary?  Yes  Does patient understand their discharge instructions: Yes  Does patient have questions or concerns that need addressed prior to 7-14 day follow up office visit: no    Scheduled appointment with PCP within 7-14 days    Follow Up  Future Appointments   Date Time Provider Herminio Tafoya   2021  9:30 AM Lenor Cabot, MD Nino [facility] [address] [facility]   2021 10:15 AM MD PEDRO PABLO Vargas [facility] [address]

## 2021-07-13 ENCOUNTER — OFFICE VISIT (OUTPATIENT)
Dept: CARDIOTHORACIC SURGERY | Age: 46
End: 2021-07-13

## 2021-07-13 ENCOUNTER — TELEPHONE (OUTPATIENT)
Dept: CARDIOTHORACIC SURGERY | Age: 46
End: 2021-07-13

## 2021-07-13 VITALS
HEIGHT: 69 IN | OXYGEN SATURATION: 97 % | WEIGHT: 192 LBS | HEART RATE: 97 BPM | DIASTOLIC BLOOD PRESSURE: 100 MMHG | BODY MASS INDEX: 28.44 KG/M2 | TEMPERATURE: 98 F | SYSTOLIC BLOOD PRESSURE: 156 MMHG

## 2021-07-13 DIAGNOSIS — Z72.0 TOBACCO USE: ICD-10-CM

## 2021-07-13 DIAGNOSIS — I25.5 ISCHEMIC CARDIOMYOPATHY: ICD-10-CM

## 2021-07-13 DIAGNOSIS — Z48.812 AFTERCARE FOLLOWING SURGERY OF THE CIRCULATORY SYSTEM: Primary | ICD-10-CM

## 2021-07-13 DIAGNOSIS — I25.10 CORONARY ARTERY DISEASE INVOLVING NATIVE CORONARY ARTERY OF NATIVE HEART WITHOUT ANGINA PECTORIS: ICD-10-CM

## 2021-07-13 DIAGNOSIS — I10 ESSENTIAL HYPERTENSION: ICD-10-CM

## 2021-07-13 DIAGNOSIS — G89.18 ACUTE POSTOPERATIVE PAIN: ICD-10-CM

## 2021-07-13 PROCEDURE — 99024 POSTOP FOLLOW-UP VISIT: CPT | Performed by: THORACIC SURGERY (CARDIOTHORACIC VASCULAR SURGERY)

## 2021-07-13 RX ORDER — OXYCODONE HYDROCHLORIDE 5 MG/1
5 TABLET ORAL EVERY 6 HOURS PRN
Qty: 28 TABLET | Refills: 0 | Status: SHIPPED | OUTPATIENT
Start: 2021-07-13 | End: 2021-07-20

## 2021-07-13 NOTE — LETTER
[facility] Cardiothoracic Surgery  [facility]  Phone: [phone]  Fax: [phone]    Lesley Reed MD      July 13, 2021     Patient: Hanna Byrne   YOB: 1975   Date of Visit: 7/13/2021       To Whom It May Concern: It is my medical opinion that Hanna Byrne may return to full duty immediately with no restrictions starting July 14, 2021. If you have any questions or concerns, please call our office.     Sincerely,        Lesley Reed MD

## 2021-07-13 NOTE — PROGRESS NOTES
Progress Note    CC:  Postoperative follow-up    S/P    CABG surgery. Subjective:    He feels like he is doing quite well. His eating and sleeping habits are improving appropriately as is his activity levels. 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 He still having some chest discomfort with strenuous activities. Vital Signs:                                                 BP (!) 156/100 (Site: Right Upper Arm, Position: Sitting, Cuff Size: Medium Adult)   Pulse 97   Temp 98 °F (36.7 °C) (Temporal)   Ht 5' 9\" (1.753 m)   Wt 192 lb (87.1 kg)   SpO2 97%   BMI 28.35 kg/m²        CV:   Regular rate and rhythm with no rubs or murmurs. Pulm: Clear lung fields with no rales or rhonchi. Incisions:    Sternal incision is clean, dry and intact. Sternum is stable. Abd:  Soft  Ext: Leg incision is clean, dry and intact. No peripheral edema. Assessment/Plan:  Overall doing very well post CABG surgery. I discussed secondary risk prevention for cardiovascular disease with the patient. The patient has received a copy of my protocol for the secondary risk prevention of cardiovascular disease. The patient may increase activities as he feels comfortable doing so. Follow-up with his PCP and Dr. Cesar Shaw as prescribed. Follow-up with me in 1 month. I refilled his Roxicodone 1 every 6 #28 no refills.     Paul Garcia MD  7/13/2021  10:00 AM

## 2021-07-29 RX ORDER — METOPROLOL TARTRATE 50 MG/1
50 TABLET, FILM COATED ORAL 2 TIMES DAILY
Qty: 60 TABLET | Refills: 0 | Status: SHIPPED | OUTPATIENT
Start: 2021-07-29 | End: 2021-10-04 | Stop reason: SDUPTHER

## 2021-07-29 RX ORDER — CLOPIDOGREL BISULFATE 75 MG/1
75 TABLET ORAL DAILY
Qty: 30 TABLET | Refills: 0 | Status: SHIPPED | OUTPATIENT
Start: 2021-07-29 | End: 2021-12-07 | Stop reason: SDUPTHER

## 2021-07-29 RX ORDER — LOSARTAN POTASSIUM 25 MG/1
25 TABLET ORAL DAILY
Qty: 30 TABLET | Refills: 0 | Status: SHIPPED | OUTPATIENT
Start: 2021-07-29 | End: 2021-08-04 | Stop reason: SDUPTHER

## 2021-07-29 RX ORDER — ATORVASTATIN CALCIUM 80 MG/1
80 TABLET, FILM COATED ORAL NIGHTLY
Qty: 30 TABLET | Refills: 0 | Status: SHIPPED | OUTPATIENT
Start: 2021-07-29 | End: 2021-11-23 | Stop reason: SDUPTHER

## 2021-07-29 NOTE — TELEPHONE ENCOUNTER
Pt needing refills of Losartan, Metoprolol, Plavix and Lipitor called in as will not be seeing Dr Lauri Alves until after Rx runs out. Will refill current dosing x 1 month after which refills are to be obtained from Dr. Lauri Alves, et al.  Pt will purchase the Pepcid over the counter if needed and will be responsible for the ASA, MVI and Thiamine as well. He continues to have pain in the leg vein harvest site that is described as intermittent, sharp, shooting pain that awakens him at night. He was wondering if refill of Oxycodone was possible. Will address with Dr. Ray Luther. In the interim pt was directed to take 2-3 advil every 8 hrs as needed for no more than 2-3 2 days and report in tomorrow 7/30 with results. He may use a heating pad to sites as well for comfort.

## 2021-07-30 ENCOUNTER — TELEPHONE (OUTPATIENT)
Dept: CARDIOTHORACIC SURGERY | Age: 46
End: 2021-07-30

## 2021-07-30 DIAGNOSIS — G89.18 POST-OP PAIN: Primary | ICD-10-CM

## 2021-07-30 RX ORDER — TRAMADOL HYDROCHLORIDE 50 MG/1
50 TABLET ORAL EVERY 6 HOURS PRN
Qty: 28 TABLET | Refills: 0 | Status: SHIPPED | OUTPATIENT
Start: 2021-07-30 | End: 2021-08-06

## 2021-07-30 NOTE — TELEPHONE ENCOUNTER
Spoke with pt regarding his intermittent pain in his vein harvest site. States continues with sharp stabbing pain that awakens him from sleep. He took Advil 2 tabs x 1 last pm and none since as it did not seem to help. Instructed to take the Advil every 8 hrs and will order Tramadol 50 mg 1 tabs every 6 hrs prn pain #28 no refills. Rx called to pharm on file as verified. Pt aware.   Mary Lemus

## 2021-08-03 PROBLEM — Z95.1 STATUS POST FOUR VESSEL CORONARY ARTERY BYPASS: Status: ACTIVE | Noted: 2021-08-03

## 2021-08-03 NOTE — PROGRESS NOTES
[facility]   CARDIAC EVALUATION NOTE  [phone]      PCP:  LULU Ortiz CNP    Reason for Consultation/Chief Complaint: hospital follow up for CAD/CABG    Subjective   History of Present Illness:  Isabel Emmanuel is a 41 y.o. patient with a history of CAD with STEMI, CABG x4, HTN and ICM who presents for hospital follow up. He was admitted to the hospital 06/26-07/02/21 with complaints of chest pain. The patient had been racing dirt bikes and began to develop chest pain with sweating. He presented to the [facility] emergency room and was noted to have inferolateral ST elevation. He was markedly hypertensive with systolic blood pressure greater than 200. He has a history of hypertension reportedly not been taking his medications. History was not obtainable from patient as he is in distress. A code STEMI alert was initiated patient was transferred to [facility]. He underwent an urgent cardiac cath on 06/26/21 which showed Successful PTA of circumflex. Successful intra-aortic balloon pump placement. Multivessel CAD/ASHD. Refer to CT surgery for consideration of CABG. His echo from 06/26/21 showed his EF was 45-50%. Mild MR. He underwent CABG x4 with DR Lisa Chaudhari on 06/28/21. Today he reports he has RLE edema at times since vein harvesting. He reports occasional RLE pain at times. DR Silva's office recommend taking ibuprofen several times a day. He reports he takes the ibuprofen occasionally. He reports he has pain on both sides of his chest on both sides of the incision site. He denies CP, palpitations, dizziness or syncope. His BP is elevated on exam.       Past Medical History:   has a past medical history of Alcohol abuse, Back pain, Class 1 obesity in adult, Coronary artery disease involving native coronary artery of native heart with unstable angina pectoris ([address] Gastroesophageal reflux disease without esophagitis, Hypertension, and Osteoarthritis.     Surgical History:   has a past surgical history that includes Coronary artery bypass graft (N/A, 6/28/2021). Social History:   reports that he quit smoking about 5 weeks ago. He smoked 1.00 pack per day. His smokeless tobacco use includes snuff. He reports current alcohol use of about 42.0 standard drinks of alcohol per week. He reports that he does not use drugs. Family History:  family history includes Diabetes in his maternal grandmother; Heart Disease in his maternal grandmother. Home Medications:  Were reviewed and are listed in nursing record and/or below  Prior to Admission medications    Medication Sig Start Date End Date Taking? Authorizing Provider   traMADol (ULTRAM) 50 MG tablet Take 1 tablet by mouth every 6 hours as needed for Pain for up to 7 days. Intended supply: 7 days.  Take lowest dose possible to manage pain 7/30/21 8/6/21 Yes Kailee Hathaway MD   atorvastatin (LIPITOR) 80 MG tablet Take 1 tablet by mouth nightly 7/29/21 8/28/21 Yes Kailee Hathaway MD   clopidogrel (PLAVIX) 75 MG tablet Take 1 tablet by mouth daily 7/29/21 8/28/21 Yes Kailee Hathaway MD   losartan (COZAAR) 25 MG tablet Take 1 tablet by mouth daily Hold for SBP <100. 7/29/21 8/28/21 Yes Kailee Hathaway MD   metoprolol tartrate (LOPRESSOR) 50 MG tablet Take 1 tablet by mouth 2 times daily Hold for SBP <100 or HR <65 7/29/21 8/28/21 Yes Kailee Hathaway MD   aspirin 81 MG EC tablet Take 1 tablet by mouth daily 7/3/21 8/2/21  LULU Ricks CNP   famotidine (PEPCID) 20 MG tablet Take 1 tablet by mouth daily 7/2/21 8/1/21  LULU Ricks CNP   Multiple Vitamins-Minerals (THERAPEUTIC MULTIVITAMIN-MINERALS) tablet Take 1 tablet by mouth daily 7/3/21 8/2/21  LULU Ricks CNP   thiamine 100 MG tablet Take 1 tablet by mouth daily 7/3/21 8/2/21  LULU Ricks CNP          Allergies:  Penicillins     Review of Systems:   A 14 point review of symptoms completed. Pertinent positives identified in the HPI, all other review of symptoms negative as below.       Objective   PHYSICAL EXAM:    Vitals:    08/04/21 1009   BP: (!) 138/96   Pulse: 71   SpO2: 98%    Weight: 198 lb (89.8 kg)         General Appearance:  Alert, cooperative, no distress, appears stated age   Head:  Normocephalic, without obvious abnormality, atraumatic   Eyes:  PERRL, conjunctiva/corneas clear   Nose: Nares normal, no drainage or sinus tenderness   Throat: Lips, mucosa, and tongue normal   Neck: Supple, symmetrical, trachea midline, no adenopathy, thyroid: not enlarged, symmetric, no tenderness/mass/nodules, no carotid bruit or JVD   Lungs:   Clear to auscultation bilaterally, respirations unlabored   Chest Wall:  No deformity or tenderness   Heart:  Regular rate and rhythm, S1, S2 normal, no murmur, rub or gallop   Abdomen:   Soft, non-tender, bowel sounds active all four quadrants,  no masses, no organomegaly   Extremities: Extremities normal, atraumatic, no cyanosis or edema   Pulses: 2+ and symmetric   Skin: Skin color, texture, turgor normal, no rashes or lesions, rt leg wounds and sternal wounds healing well   Pysch: Normal mood and affect   Neurologic: Normal gross motor and sensory exam.         Labs   CBC:   Lab Results   Component Value Date    WBC 13.0 07/02/2021    RBC 3.63 07/02/2021    HGB 11.7 07/02/2021    HCT 33.4 07/02/2021    MCV 92.0 07/02/2021    RDW 12.7 07/02/2021     07/02/2021     CMP:  Lab Results   Component Value Date     07/02/2021    K 4.5 07/02/2021     07/02/2021    CO2 25 07/02/2021    BUN 23 07/02/2021    CREATININE 1.1 07/02/2021    GFRAA >60 07/02/2021    AGRATIO 1.3 06/28/2021    LABGLOM >60 07/02/2021    GLUCOSE 125 07/02/2021    PROT 6.9 06/28/2021    CALCIUM 9.1 07/02/2021    BILITOT 0.7 06/28/2021    ALKPHOS 79 06/28/2021    AST 64 06/28/2021    ALT 54 06/28/2021     PT/INR:  No results found for: PTINR  HgBA1c:No results found for: LABA1C  Lab Results   Component Value Date    TROPONINI 0.06 (H) 2021         Cardiac Data     Last EK21  SR HR 99    Echo: 21   The left ventricular systolic function is mildly reduced with an ejection   fraction of 45 -50 %. There is hypokinesis of the inferolateral and anterolateral walls. Mild concentric left ventricular hypertrophy. Left ventricular diastolic filling pressure is normal.   Mild mitral regurgitation. Stress Test:    Cath: 21  FINDINGS         LVGRAM     LVEDP  9   GRADIENT ACROSS AORTIC VALVE  none   LV FUNCTION EF 60%   WALL MOTION  normal   MITRAL REGURGITATION  mild            CORONARY ARTERIES     LM Long, large vessel, proximalmid less than 10% stenosis, distal vessel has tapering with 50 to 60% stenosis. LAD  Ostial/proximal 80% stenosis. Middistal 60% stenosis. D1 is a small to medium size vessel with proximalmid 75% stenosis. LCX Markedly tortuous vessel particularly at ostium and in the proximal segments, there is 50 to 60% stenosis followed by middistal 99% stenosis with LAILA II flow. RI  This vessel could also be described as a high OM1 with 40-50 stent proximalmid stenosis. RCA Dominant cough tortuous, calcified, proximal 50% stenosis, mid 50% stenosis, distal 90% stenosis. PERCUTANEOUS INTERVENTION DESCRIPTION      Heparin was used for anticoagulation, patient had already been preloaded with Brilinta. Integrilin was given during the procedure and was ordered for continuous infusion as patient may ultimately require CABG. Initially a 6 Italian mL 3.5 guiding catheter was taken and this was used to intubate the left main. A choice floppy wire was initially taken however due to severe circumflex tortuosity, there was difficulty with wiring the circumflex lesion which was felt to be the culprit for patient's current presentation.   As such a Kera dual lumen microcatheter was taken and the choice floppy wire as well as a Medtronic cougar wire were taken through this. The choice floppy wire was advanced with the rapid exchange port and the Medtronic cougar was advanced through the proximal port. With these wires in place including with the choice wire in the high OM/ramus artery, the Medtronic cougar wire was able to be manipulated into the circumflex and across the culprit lesion. Then the choice wire was removed and using a Trapper balloon, the microcatheter was also able to be removed. With a Medtronic cougar wire in place, a 2.5 mm balloon was able to be utilized to perform angioplasty of the circumflex. With that there was improvement in flow from LAILA II to LAILA-3. There is 20% residual stenosis. Patient's EKG improved. There is no further chest pain. Residual CAD/ASHD was felt to be best treated with CABG and CT surgery consultation has been placed. Findings/plans were discussed with patient and wife, they understand the gravity situation that there is a high risk of deterioration and complications which include with extensive CAD. They understand and wish to proceed with plans as outlined. Difficulty in wiring coronary led to delay in PTA/PCI. CONCLUSIONS:      Successful PTA of circumflex  Successful intra-aortic balloon pump placement     Multivessel CAD/ASHD  Refer to CT surgery for consideration of CABG  If not felt to be a candidate for CABG, can consider high risk multivessel PCI. CAB21 with Dr Calixto Todd:  1. Urgent coronary bypass grafting surgery x4 with sequential greater  saphenous vein graft to the ramus intermediate branch onto the obtuse  marginal branch of circumflex, separate single greater saphenous vein  graft to the posterior ventricular branch of the right coronary artery,  pedicled left internal artery to the LAD. 2.  Left atrial appendage obliteration with 35-mm AtriCure left atrial  clip.   3. Cardiopulmonary bypass. 4.  Endoscopic vein harvest of right greater saphenous vein. 5.  Transesophageal echo. 6.  Epiaortic ultrasound. 7.  Doppler verification of grafts. 8.  Bilateral five-level intercostal nerve block with Exparel. 9.  Platelet gel application. 10.  Removal of preoperatively placed intraaortic balloon pump. Studies:       I have reviewed labs and imaging/xray/diagnostic testing in this note. Assessment      1. Coronary artery disease involving native coronary artery of native heart with unstable angina pectoris ([address]    2. Status post four vessel coronary artery bypass    3. Essential hypertension    4. Ischemic cardiomyopathy                 Plan   1. Increase losartan to 50 mg daily  2. Liver, lipids, and BMP in 2 weeks  3. Echo for heart function  4. Follow up 2 months      Scribe's attestation: This note was scribed in the presence of Dr. Cyn Lopez by Jeanine Zapata RN      Thank you for allowing us to participate in the care of [facility]. Please call me with any questions [phone]. Cyn Lopez MD, [facility] [place]   Interventional Cardiologist  [place] [address]  [phone] [facility]  [phone] [address]  8/4/2021 10:17 AM    I will address the patient's cardiac risk factors and adjusted pharmacologic treatment as needed. In addition, I have reinforced the need for patient directed risk factor modification. Tobacco use was discussed with the patient and educated on the negative effects and was asked not to use. All questions and concerns were addressed to the patient/family. Alternatives to my treatment were discussed. I, Dr Cyn Lopez, personally performed the services described in this documentation, as scribed by the above signed scribe in my presence. It is both accurate and complete to my knowledge.   I agree with the details independently gathered by the clinical support staff and the scribed note accurately describes my personal service to the patient.

## 2021-08-04 ENCOUNTER — OFFICE VISIT (OUTPATIENT)
Dept: CARDIOLOGY CLINIC | Age: 46
End: 2021-08-04

## 2021-08-04 VITALS
BODY MASS INDEX: 29.33 KG/M2 | HEIGHT: 69 IN | HEART RATE: 71 BPM | SYSTOLIC BLOOD PRESSURE: 138 MMHG | OXYGEN SATURATION: 98 % | DIASTOLIC BLOOD PRESSURE: 96 MMHG | WEIGHT: 198 LBS

## 2021-08-04 DIAGNOSIS — E78.2 MIXED HYPERLIPIDEMIA: ICD-10-CM

## 2021-08-04 DIAGNOSIS — Z95.1 STATUS POST FOUR VESSEL CORONARY ARTERY BYPASS: ICD-10-CM

## 2021-08-04 DIAGNOSIS — I25.110 CORONARY ARTERY DISEASE INVOLVING NATIVE CORONARY ARTERY OF NATIVE HEART WITH UNSTABLE ANGINA PECTORIS (HCC): Primary | ICD-10-CM

## 2021-08-04 DIAGNOSIS — I25.5 ISCHEMIC CARDIOMYOPATHY: ICD-10-CM

## 2021-08-04 DIAGNOSIS — I10 ESSENTIAL HYPERTENSION: ICD-10-CM

## 2021-08-04 PROCEDURE — 99214 OFFICE O/P EST MOD 30 MIN: CPT | Performed by: INTERNAL MEDICINE

## 2021-08-04 RX ORDER — LOSARTAN POTASSIUM 50 MG/1
50 TABLET ORAL DAILY
Qty: 90 TABLET | Refills: 3 | Status: SHIPPED | OUTPATIENT
Start: 2021-08-04 | End: 2021-12-07 | Stop reason: SDUPTHER

## 2021-08-04 NOTE — LETTER
[facility] [place] [address]  Phone: [phone]  Fax: [phone]    Elgin Rogers MD    August 4, 2021     Sourav Blount, APRN - [facility]  [address]    Patient: Fiona Jimenes   MR Number: [id number]   YOB: 1975   Date of Visit: 8/4/2021       Dear Sourav Blount:    Thank you for referring Fiona Jimenes to me for evaluation/treatment. Below are the relevant portions of my assessment and plan of care. If you have questions, please do not hesitate to call me. I look forward to following Sara Quintana along with you.     Sincerely,      Elgin Rogers MD

## 2021-08-04 NOTE — PATIENT INSTRUCTIONS
Plan   1. Increase losartan to 50 mg daily  2. Liver, lipids, and BMP in 2 weeks  3. Echo for heart function  4. Follow up 2 months    Call [phone] for refills    Your provider has ordered testing for further evaluation. An order/prescription has been included in your paper work.  To schedule outpatient testing, contact Central Scheduling by calling [address] [place] [address] ([phone]).

## 2021-08-10 ENCOUNTER — OFFICE VISIT (OUTPATIENT)
Dept: CARDIOTHORACIC SURGERY | Age: 46
End: 2021-08-10

## 2021-08-10 VITALS
BODY MASS INDEX: 28.44 KG/M2 | SYSTOLIC BLOOD PRESSURE: 138 MMHG | HEART RATE: 74 BPM | HEIGHT: 69 IN | WEIGHT: 192 LBS | DIASTOLIC BLOOD PRESSURE: 104 MMHG | OXYGEN SATURATION: 96 % | TEMPERATURE: 97.2 F

## 2021-08-10 DIAGNOSIS — I25.10 CORONARY ARTERY DISEASE INVOLVING NATIVE CORONARY ARTERY OF NATIVE HEART WITHOUT ANGINA PECTORIS: ICD-10-CM

## 2021-08-10 DIAGNOSIS — Z48.812 AFTERCARE FOLLOWING SURGERY OF THE CIRCULATORY SYSTEM: Primary | ICD-10-CM

## 2021-08-10 DIAGNOSIS — I10 ESSENTIAL HYPERTENSION: ICD-10-CM

## 2021-08-10 DIAGNOSIS — Z72.0 TOBACCO USE: ICD-10-CM

## 2021-08-10 DIAGNOSIS — I25.5 ISCHEMIC CARDIOMYOPATHY: ICD-10-CM

## 2021-08-10 PROCEDURE — 99024 POSTOP FOLLOW-UP VISIT: CPT | Performed by: THORACIC SURGERY (CARDIOTHORACIC VASCULAR SURGERY)

## 2021-08-10 RX ORDER — CLOPIDOGREL BISULFATE 75 MG/1
75 TABLET ORAL DAILY
Qty: 30 TABLET | Refills: 1 | Status: SHIPPED | OUTPATIENT
Start: 2021-08-10 | End: 2021-08-31 | Stop reason: SDUPTHER

## 2021-08-10 NOTE — PROGRESS NOTES
Progress Note    CC:  Postoperative follow-up    S/P    CABG x 3 with MERRILL clip    Subjective:    Patient is doing very well. He is back to working as a , without pain. Sleeping and eating well. Denies CP, SOB, fatigue. Vital Signs:                                                 BP (!) 138/104 (Site: Left Upper Arm, Position: Sitting, Cuff Size: Medium Adult)   Pulse 74   Temp 97.2 °F (36.2 °C) (Temporal)   Ht 5' 9\" (1.753 m)   Wt 192 lb (87.1 kg)   SpO2 96%   BMI 28.35 kg/m²        CV:   Regular rate and rhythm with no rubs or murmurs. Pulm: Clear lung fields with no rales or rhonchi. Incisions:   Midsternal incision is clean, dry and intact. Sternum is stable. Abd:  Soft  Ext: Leg incision is clean, dry and intact. No peripheral edema. Assessment/Plan:  Overall doing very well post CABG x 3 with MERRILL clip. DBP in the 90's-100's taking Losartan 50 mg QD, and metoprolol 50 mg BID per Dr. Jerrell Álvarez. Will have patient f/u with PCP for management. I discussed secondary risk prevention for cardiovascular disease with the patient. The patient has received a copy of my protocol for the secondary risk prevention of cardiovascular disease. The patient may increase activities as he feels comfortable doing so. Follow-up with PCP, and Dr. Jerrell Álvarez as prescribed. Follow-up with Dr. Gwyn Esquivel and myself as needed. Patient was seen and examined by Dr. Gwyn Esquivel. Erika Abreu PA-C  8/10/2021  9:25 AM   Note reviewed along with vital signs and I/Os and patient examined. Assessment and plans discussed and are as outlined above.      Juan Luis Fang MD, FACS, [facility] [place], FACARAM, [place]

## 2021-08-20 ENCOUNTER — HOSPITAL ENCOUNTER (OUTPATIENT)
Dept: NON INVASIVE DIAGNOSTICS | Age: 46
Discharge: HOME | End: 2021-08-20

## 2021-08-20 DIAGNOSIS — I25.5 ISCHEMIC CARDIOMYOPATHY: ICD-10-CM

## 2021-08-20 LAB
LV EF: 55 %
LVEF MODALITY: NORMAL

## 2021-08-20 PROCEDURE — 93306 TTE W/DOPPLER COMPLETE: CPT

## 2021-08-23 ENCOUNTER — TELEPHONE (OUTPATIENT)
Dept: CARDIOLOGY CLINIC | Age: 46
End: 2021-08-23

## 2021-08-23 NOTE — TELEPHONE ENCOUNTER
----- Message from Samina Simpson MD sent at 8/23/2021  9:28 AM EDT -----  Let patient know echo test shows normal heart function, no new orders or changes at this time. Thanks.

## 2021-08-31 RX ORDER — CLOPIDOGREL BISULFATE 75 MG/1
75 TABLET ORAL DAILY
Qty: 30 TABLET | Refills: 1 | Status: SHIPPED | OUTPATIENT
Start: 2021-08-31 | End: 2021-10-04 | Stop reason: SDUPTHER

## 2021-08-31 NOTE — TELEPHONE ENCOUNTER
This was routed to [facility] ([facility]) not sure why, looks like it should go to Cardiology not [facility]

## 2021-08-31 NOTE — TELEPHONE ENCOUNTER
Last office visit Visit date not found     Last written 8/10/2021    Next office visit scheduled Visit date not found    Requested Prescriptions     Pending Prescriptions Disp Refills    clopidogrel (PLAVIX) 75 MG tablet 30 tablet 1     Sig: Take 1 tablet by mouth daily

## 2021-10-01 NOTE — TELEPHONE ENCOUNTER
Pt is requesting refills on Metoprolol Tartrate 50 mg BID, and Plavix 75 mg once daily. Pt prefers a 30 day supply. Preferred pharmacy is [facility] in [place] @ [phone]. Last OV 8/4/2021 with ELIZA.
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
## 2021-10-04 RX ORDER — CLOPIDOGREL BISULFATE 75 MG/1
75 TABLET ORAL DAILY
Qty: 30 TABLET | Refills: 11 | Status: SHIPPED | OUTPATIENT
Start: 2021-10-04 | End: 2022-04-12 | Stop reason: SDUPTHER

## 2021-10-04 RX ORDER — METOPROLOL TARTRATE 50 MG/1
50 TABLET, FILM COATED ORAL 2 TIMES DAILY
Qty: 60 TABLET | Refills: 11 | Status: SHIPPED | OUTPATIENT
Start: 2021-10-04 | End: 2021-12-07 | Stop reason: SDUPTHER

## 2021-10-06 ENCOUNTER — OFFICE VISIT (OUTPATIENT)
Dept: CARDIOLOGY CLINIC | Age: 46
End: 2021-10-06

## 2021-10-06 VITALS
WEIGHT: 199 LBS | SYSTOLIC BLOOD PRESSURE: 118 MMHG | OXYGEN SATURATION: 96 % | HEIGHT: 69 IN | DIASTOLIC BLOOD PRESSURE: 86 MMHG | HEART RATE: 73 BPM | BODY MASS INDEX: 29.47 KG/M2

## 2021-10-06 DIAGNOSIS — I25.110 CORONARY ARTERY DISEASE INVOLVING NATIVE CORONARY ARTERY OF NATIVE HEART WITH UNSTABLE ANGINA PECTORIS (HCC): Primary | ICD-10-CM

## 2021-10-06 DIAGNOSIS — I25.5 ISCHEMIC CARDIOMYOPATHY: ICD-10-CM

## 2021-10-06 DIAGNOSIS — I10 ESSENTIAL HYPERTENSION: ICD-10-CM

## 2021-10-06 PROCEDURE — 99214 OFFICE O/P EST MOD 30 MIN: CPT | Performed by: INTERNAL MEDICINE

## 2021-10-06 NOTE — LETTER
[facility] [address]  Phone: [phone]  Fax: [phone]    Tomasa Dunbar MD    October 6, 2021     Armand Garcia, APRN - [facility]  [address] [facility],[address]    Patient: Jessica Hopson   MR Number: [id number]   YOB: 1975   Date of Visit: 10/6/2021       Dear Armand Garcia:    Thank you for referring Jessica Hopson to me for evaluation/treatment. Below are the relevant portions of my assessment and plan of care. If you have questions, please do not hesitate to call me. I look forward to following Gordon Awkward along with you.     Sincerely,      Tomasa Dunbar MD
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
## 2021-11-19 RX ORDER — ATORVASTATIN CALCIUM 80 MG/1
TABLET, FILM COATED ORAL
Qty: 30 TABLET | Refills: 0 | OUTPATIENT
Start: 2021-11-19

## 2021-11-23 RX ORDER — ATORVASTATIN CALCIUM 80 MG/1
80 TABLET, FILM COATED ORAL NIGHTLY
Qty: 90 TABLET | Refills: 3 | Status: SHIPPED | OUTPATIENT
Start: 2021-11-23 | End: 2021-11-28 | Stop reason: SDUPTHER

## 2021-11-26 NOTE — TELEPHONE ENCOUNTER
Pt is needing his atorvastatin and losartan medications be sent to a [facility] pharmacy. The new pharmacy is placed in pt's chart. Please resend medication to that pharmacy.

## 2021-11-28 RX ORDER — ATORVASTATIN CALCIUM 80 MG/1
80 TABLET, FILM COATED ORAL NIGHTLY
Qty: 90 TABLET | Refills: 3 | Status: SHIPPED | OUTPATIENT
Start: 2021-11-28 | End: 2022-06-28 | Stop reason: SDUPTHER

## 2021-12-07 DIAGNOSIS — R06.83 SNORING: Primary | ICD-10-CM

## 2021-12-07 DIAGNOSIS — I10 ESSENTIAL HYPERTENSION: ICD-10-CM

## 2021-12-07 RX ORDER — METOPROLOL TARTRATE 50 MG/1
50 TABLET, FILM COATED ORAL 2 TIMES DAILY
Qty: 180 TABLET | Refills: 1 | Status: SHIPPED | OUTPATIENT
Start: 2021-12-07 | End: 2022-04-12 | Stop reason: SDUPTHER

## 2021-12-07 RX ORDER — CLOPIDOGREL BISULFATE 75 MG/1
75 TABLET ORAL DAILY
Qty: 90 TABLET | Refills: 0 | Status: SHIPPED | OUTPATIENT
Start: 2021-12-07 | End: 2022-06-28 | Stop reason: ALTCHOICE

## 2021-12-07 RX ORDER — LOSARTAN POTASSIUM 50 MG/1
50 TABLET ORAL DAILY
Qty: 90 TABLET | Refills: 3 | Status: SHIPPED | OUTPATIENT
Start: 2021-12-07 | End: 2022-04-12 | Stop reason: SDUPTHER

## 2022-03-01 ENCOUNTER — OFFICE VISIT (OUTPATIENT)
Dept: PULMONOLOGY | Age: 47
End: 2022-03-01

## 2022-03-01 VITALS
RESPIRATION RATE: 18 BRPM | HEIGHT: 69 IN | HEART RATE: 106 BPM | WEIGHT: 212 LBS | OXYGEN SATURATION: 98 % | SYSTOLIC BLOOD PRESSURE: 157 MMHG | TEMPERATURE: 99.2 F | BODY MASS INDEX: 31.4 KG/M2 | DIASTOLIC BLOOD PRESSURE: 114 MMHG

## 2022-03-01 DIAGNOSIS — R06.02 SHORTNESS OF BREATH: ICD-10-CM

## 2022-03-01 DIAGNOSIS — Z72.0 TOBACCO USE: ICD-10-CM

## 2022-03-01 DIAGNOSIS — G47.30 OBSERVED SLEEP APNEA: Primary | ICD-10-CM

## 2022-03-01 DIAGNOSIS — F10.10 ALCOHOL ABUSE: ICD-10-CM

## 2022-03-01 DIAGNOSIS — I10 UNCONTROLLED HYPERTENSION: ICD-10-CM

## 2022-03-01 DIAGNOSIS — R09.81 NASAL CONGESTION: ICD-10-CM

## 2022-03-01 DIAGNOSIS — E66.09 CLASS 1 OBESITY DUE TO EXCESS CALORIES WITH SERIOUS COMORBIDITY AND BODY MASS INDEX (BMI) OF 31.0 TO 31.9 IN ADULT: ICD-10-CM

## 2022-03-01 DIAGNOSIS — Z95.1 STATUS POST FOUR VESSEL CORONARY ARTERY BYPASS: ICD-10-CM

## 2022-03-01 PROCEDURE — 99214 OFFICE O/P EST MOD 30 MIN: CPT | Performed by: INTERNAL MEDICINE

## 2022-03-01 ASSESSMENT — SLEEP AND FATIGUE QUESTIONNAIRES
HOW LIKELY ARE YOU TO NOD OFF OR FALL ASLEEP WHILE LYING DOWN TO REST IN THE AFTERNOON WHEN CIRCUMSTANCES PERMIT: 3
HOW LIKELY ARE YOU TO NOD OFF OR FALL ASLEEP WHEN YOU ARE A PASSENGER IN A CAR FOR AN HOUR WITHOUT A BREAK: 1
NECK CIRCUMFERENCE (INCHES): 18
HOW LIKELY ARE YOU TO NOD OFF OR FALL ASLEEP WHILE SITTING INACTIVE IN A PUBLIC PLACE: 0
HOW LIKELY ARE YOU TO NOD OFF OR FALL ASLEEP WHILE WATCHING TV: 1
HOW LIKELY ARE YOU TO NOD OFF OR FALL ASLEEP WHILE SITTING QUIETLY AFTER LUNCH WITHOUT ALCOHOL: 2
HOW LIKELY ARE YOU TO NOD OFF OR FALL ASLEEP WHILE SITTING AND READING: 3
HOW LIKELY ARE YOU TO NOD OFF OR FALL ASLEEP IN A CAR, WHILE STOPPED FOR A FEW MINUTES IN TRAFFIC: 0
ESS TOTAL SCORE: 11
HOW LIKELY ARE YOU TO NOD OFF OR FALL ASLEEP WHILE SITTING AND TALKING TO SOMEONE: 1

## 2022-03-01 NOTE — PROGRESS NOTES
C/O Pulmonary evaluation and to discuss about sleep apnea as referred by Dr Kaitlynn Monahan      HPI: Patient is a 71-year-old male who has been referred back to the office for pulmonary evaluation by Dr. Pari Kee, patient was seen in the hospital last year where he was hospitalized because of ST elevation MI and patient underwent CABG, patient states that he was told to get evaluated for sleep apnea, patient continues to have increased snoring as being told by his spouse, patient also gasps for air once or twice a night, patient also has been having increased tiredness and sleepiness in the daytime, patient states that he used to attribute that to working third shift but patient states for the last 5 years time he is working dayshift and patient's clinical status has not changed, patient states that he his nose is clogged, patient also has dry mouth and soreness of the throat in the morning time, patient also has been having shortness of breath with exertion, patient's exercise tolerance has gone down, patient does not have any chest pain on exertion at this time, patient used to have reflux symptoms prior to the open heart surgery but it has improved, patient does not have any fever no chills, patient does not have any significant epistaxis or hemoptysis, patient does not have any significant altered bowel habits, patient states that he does have increased leg swelling, patient does not take any water pill, patient also states that his salty intake is high, patient does not know any recent cholesterol readings as such, patient states that since June of last year he has gained about 20 to 25 pounds, patient also continues to smoke half a pack a day, patient continues to drink alcohol on every day basis, patient also does not eat right, patient has dogs and cats as pets, patient has electric base heating without any humidification, patient when evaluated was not complaining of any significant headaches or blurring of vision or any sensation as if there is a curtain in front of the eyes, patient did not have any dizziness or diaphoresis, patient was alert and communicative, no other pertinent review of system of concern              Review of Systems same as above     Physical Exam:  Blood pressure (!) 157/114, pulse 106, temperature 99.2 °F (37.3 °C), temperature source Temporal, resp. rate 18, height 5' 9\" (1.753 m), weight 212 lb (96.2 kg), SpO2 98 %.'  Constitutional:  No acute distress. HENT:  Oropharynx is clear and moist. Nothyromegaly. Eyes:  Conjunctivae are normal. Pupils equal, round, and reactive to light. No scleral icterus. Neck: . No tracheal deviation present. No obviousthyroid mass. Short large neck  Cardiovascular: Normal rate, regular rhythm, normal heart sounds. No right ventricular heave. No lower extremity edema. Pulmonary/Chest: No wheezes. No rales. Chest wall is not dull to percussion. No accessory muscle usage or stridor. Decreased breath sound density  Abdominal: Soft. Bowel sounds present. No distension or hernia. No tenderness. Obese   musculoskeletal: No cyanosis. No clubbing. No obvious joint deformity. Lymphadenopathy: No cervical or supraclavicularadenopathy. Skin: Skin is warm and dry. No rash or nodules on the exposed extremities. Psychiatric: Normal mood and affect. Behavior is normal.  No anxiety. Neurologic: Alert, awake and oriented. PERRL. Speechfluent      Sleep Medicine Data:  Sitting and reading: High chance of dozing  Watching TV: Slight chance of dozing  Sitting, inactive in a public place (e.g. a theatre or a meeting): Would never doze  As a passenger in a car for an hour without a break: Slight chance of dozing  Lying down to rest in the afternoon when circumstances permit: High chance of dozing  Sitting and talking to someone: Slight chance of dozing  Sitting quietly after a lunch without alcohol:  Moderate chance of dozing  In a car, while stopped for a few minutes in traffic: Would never doze  Total score: 11  Neck circumference (Inches): 18        Data:     Imaging:  I have reviewed radiology images personally. No orders to display     ECHO-Summary   Left ventricular systolic function is normal with ejection fraction   estimated at 55%. No regional wall motion abnormalities. There is mild concentric left ventricular hypertrophy. Normal left ventricular diastolic filling pressure. Mild mitral regurgitation. Mild tricuspid regurgitation. Right atrial enlargement. Systolic pulmonary artery pressure (SPAP) is normal estimated at 30 mmHg   (Right atrial pressure of 3 mmHg). Compared to exam done 6/26/2021, left ventricular systolic function has   improved. ONE XRAY VIEW OF THE CHEST       6/28/2021 2:02 pm       COMPARISON:   06/26/2021       HISTORY:   ORDERING SYSTEM PROVIDED HISTORY: Post op open heart surgery   TECHNOLOGIST PROVIDED HISTORY:   After patient is in ICU post open heart surgery   Reason for exam:->Post op open heart surgery   Reason for Exam: post op open heart surgery   Acuity: Unknown   Type of Exam: Subsequent/Follow-up       FINDINGS:   Status post median sternotomy.  Heart size stable.  Left chest tube in place. No pneumothorax.  Bibasilar hypoaeration.           Impression   Status post median sternotomy.  No pneumothorax       Assessment:    1. Observed sleep apnea    - Home Sleep Study; Future    2. Shortness of breath    - Full PFT Study With Bronchodilator; Future    3. Class 1 obesity due to excess calories with serious comorbidity and body mass index (BMI) of 31.0 to 31.9 in adult    - Home Sleep Study; Future    4. Tobacco use    - Full PFT Study With Bronchodilator; Future    5. Status post four vessel coronary artery bypass      6. Nasal congestion      7. Alcohol abuse      8.  Uncontrolled hypertension          Plan:   · Patient's review of system were discussed  · Patient was told about the clinical finding including auscultation and implications  · Patient was told about the pathophysiology of the disease process and its modifying factors and patient was told that it is imperative for him to stop smoking otherwise he will have increasing morbidity mortality and can be respiratory cripple  · Patient was also told about the pathophysiology and sequelae of SOLITARIO  · Pretest probability of significant sleep apnea is high  · Clinically patient also has COPD along with that patient has obesity along with that patient has SOLITARIO along with that patient also continues to be a smoker and patient also drinks alcohol on a regular basis and patient had ST elevation MI with status post CABG last year and patient was told about the implications of these diagnoses in detail  · Patient's echocardiogram from last year was reviewed  · Patient's x-ray chest was reviewed  · Patient was told that he needs to have a comprehensive plan to take care of himself otherwise he will have increasing morbidity mortality  · Patient has to pay attention to his diet lifestyle modifications including smoking and alcohol cessation along with that patient has to lose weight and have a regular regimented exercise along with medications otherwise he will have issues which may be detrimental to his long-term health  · Patient also has uncontrolled hypertension on today's visit and patient states that he has taken his medications as prescribed  · Patient was told to check his blood pressure for couple of times in the next few days time and if still on the higher side and needs to discuss with PCP/cardiology about options  · Patient was told about salt and fluid restriction in the diet  · Patient needs to lose weight  · A regular regimented exercise will help  · Patient needs to have a PFT to assess for any obstructive and restrictive lung disease which is being ordered  · Patient also needs a home sleep study which is being ordered  · Smoking cessation reinforced  · As stated above patient has to have a comprehensive plan to take it himself otherwise he will have a downhill course in terms of his health

## 2022-03-01 NOTE — PATIENT INSTRUCTIONS
Remember to bring a list of pulmonary medications and any CPAP or BiPAP machines to your next appointment with the office. Please keep all of your future appointments scheduled by [address], [facility] office. Out of respect for other patients and providers, you may be asked to reschedule your appointment if you arrive later than your scheduled appointment time. Appointments cancelled less than 24hrs in advance will be considered a no show. Patients with three missed appointments within 1 year or four missed appointments within 2 years can be dismissed from the practice. Please be aware that our physicians are required to work in the Intensive Care Unit at [facility].  Your appointment may need to be rescheduled if they are designated to work during your appointment time. You may receive a survey regarding the care you received during your visit. Your input is valuable to us. We encourage you to complete and return your survey. We hope you will choose us in the future for your healthcare needs. Pt instructed of all future appointment dates & times, including radiology, labs, procedures & referrals. If procedures were scheduled preparation instructions provided. Instructions on future appointments with [facility] were given.

## 2022-03-01 NOTE — PROGRESS NOTES
MA Communication:   The following orders are received by verbal communication from Rui Martinez MD    Orders include:    PFT  HST  Follow up

## 2022-03-09 ENCOUNTER — HOSPITAL ENCOUNTER (OUTPATIENT)
Dept: SLEEP CENTER | Age: 47
Discharge: HOME | End: 2022-03-11

## 2022-03-09 DIAGNOSIS — E66.09 CLASS 1 OBESITY DUE TO EXCESS CALORIES WITH SERIOUS COMORBIDITY AND BODY MASS INDEX (BMI) OF 31.0 TO 31.9 IN ADULT: ICD-10-CM

## 2022-03-09 DIAGNOSIS — G47.30 OBSERVED SLEEP APNEA: ICD-10-CM

## 2022-03-09 PROCEDURE — 95806 SLEEP STUDY UNATT&RESP EFFT: CPT

## 2022-03-10 PROCEDURE — 95806 SLEEP STUDY UNATT&RESP EFFT: CPT | Performed by: INTERNAL MEDICINE

## 2022-03-18 ENCOUNTER — TELEPHONE (OUTPATIENT)
Dept: PULMONOLOGY | Age: 47
End: 2022-03-18

## 2022-03-22 ENCOUNTER — TELEPHONE (OUTPATIENT)
Dept: PULMONOLOGY | Age: 47
End: 2022-03-22

## 2022-03-22 NOTE — TELEPHONE ENCOUNTER
Within this Telehealth Consent, the terms you and yours refer to the person using the Telehealth Service (Service), or in the case of a use of the Service by or on behalf of a minor, you and yours refer to and include (i) the parent or legal guardian who provides consent to the use of the Service by such minor or uses the Service on behalf of such minor, and (ii) the minor for whom consent is being provided or on whose behalf the Service is being utilized. When using Service, you will be consulting with your health care providers via the use of Telehealth.   Telehealth involves the delivery of healthcare services using electronic communications, information technology or other means between a healthcare provider and a patient who are not in the same physical location. Telehealth may be used for diagnosis, treatment, follow-up and/or patient education, and may include, but is not limited to, one or more of the following:    Electronic transmission of medical records, photo images, personal health information or other data between a patient and a healthcare provider    Interactions between a patient and healthcare provider via audio, video and/or data communications    Use of output data from medical devices, sound and video files    Anticipated Benefits   The use of Telehealth by your Provider(s) through the Service may have the following possible benefits:    Making it easier and more efficient for you to access medical care and treatment for the conditions treated by such Provider(s) utilizing the Service    Allowing you to obtain medical care and treatment by Provider(s) at times that are convenient for you    Enabling you to interact with Provider(s) without the necessity of an in-office appointment     Possible Risks   While the use of Telehealth can provide potential benefits for you, there are also potential risks associated with the use of Telehealth.  These risks include, but may not be limited to the following:    Your Provider(s) may not able to provide medical treatment for your particular condition and you may be required to seek alternative healthcare or emergency care services.  The electronic systems or other security protocols or safeguards used in the Service could fail, causing a breach of privacy of your medical or other information.  Given regulatory requirements in certain jurisdictions, your Provider(s) diagnosis and/or treatment options, especially pertaining to certain prescriptions, may be limited. Acceptance   1. You understand that Services will be provided via Telehealth. This process involves the use of HIPAA compliant and secure, real-time audio-visual interfacing with a qualified and appropriately trained provider located at [facility]. 2. You understand that, under no circumstances, will this session be recorded. 3. You understand that the Provider(s) at [facility] and other clinical participants will be party to the information obtained during the Telehealth session in accordance with best medical practices. 4. You understand that the information obtained during the Telehealth session will be used to help determine the most appropriate treatment options. 5. You understand that You have the right to revoke this consent at any point in time. 6. You understand that Telehealth is voluntary, and that continued treatment is not dependent upon consent. 7. You understand that, in the event of non-consent to Telehealth services and/or technical difficulties, you will obtain services as typically provided in the absence of Telehealth technology. 8. You understand that this consent will be kept in Your medical record. 9. No potential benefits from the use of Telehealth or specific results can be guaranteed. Your condition may not be cured or improved and, in some cases, may get worse.    10. There are limitations in the provision of medical care and treatment via Telehealth and the Service and you may not be able to receive diagnosis and/or treatment through the Service for every condition for which you seek diagnosis and/or treatment. 11. There are potential risks to the use of Telehealth, including but not limited to the risks described in this Telehealth Consent. 12. Your Provider(s) have discussed the use of Telehealth and the Service with you, including the benefits and risks of such and you have provided oral consent to your Provider(s) for the use of Telehealth and the Service. 15. You understand that it is your duty to provide your Provider(s) truthful, accurate and complete information, including all relevant information regarding care that you may have received or may be receiving from other healthcare providers outside of the Service. 14. You understand that each of your Provider(s) may determine in his or sole discretion that your condition is not suitable for diagnosis and/or treatment using the Service, and that you may need to seek medical care and treatment a specialist or other healthcare provider, outside of the Service. 15. You understand that you are fully responsible for payment for all services provided by Provider(s) or through use of the Service and that you may not be able to use third-party insurance. 16. You represent that (a) you have read this Telehealth Consent carefully, (b) you understand the risks and benefits of the Service and the use of Telehealth in the medical care and treatment provided to you by Provider(s) using the Service, and (c) you have the legal capacity and authority to provide this consent for yourself and/or the minor for which you are consenting under applicable federal and state laws, including laws relating to the age of [de-identified] and/or parental/guardian consent.    17. You give your informed consent to the use of Telehealth by Provider(s) using the Service under the terms described in the Terms of Service and this Telehealth Consent. The patient was read the following statement and has consented to the visit as of 3/22/22. The patient has been scheduled for their first telehealth visit on 3/23/22ith Leonard Kumar.    Ottoniel

## 2022-03-23 ENCOUNTER — TELEMEDICINE (OUTPATIENT)
Dept: PULMONOLOGY | Age: 47
End: 2022-03-23

## 2022-03-23 DIAGNOSIS — G47.33 OSA (OBSTRUCTIVE SLEEP APNEA): Primary | ICD-10-CM

## 2022-03-23 DIAGNOSIS — E66.09 CLASS 1 OBESITY DUE TO EXCESS CALORIES WITH SERIOUS COMORBIDITY AND BODY MASS INDEX (BMI) OF 31.0 TO 31.9 IN ADULT: ICD-10-CM

## 2022-03-23 DIAGNOSIS — I25.5 ISCHEMIC CARDIOMYOPATHY: ICD-10-CM

## 2022-03-23 DIAGNOSIS — Z72.0 TOBACCO USE: ICD-10-CM

## 2022-03-23 PROCEDURE — 99213 OFFICE O/P EST LOW 20 MIN: CPT | Performed by: INTERNAL MEDICINE

## 2022-03-23 ASSESSMENT — SLEEP AND FATIGUE QUESTIONNAIRES
HOW LIKELY ARE YOU TO NOD OFF OR FALL ASLEEP WHILE WATCHING TV: 2
HOW LIKELY ARE YOU TO NOD OFF OR FALL ASLEEP WHEN YOU ARE A PASSENGER IN A CAR FOR AN HOUR WITHOUT A BREAK: 1
HOW LIKELY ARE YOU TO NOD OFF OR FALL ASLEEP WHILE SITTING AND READING: 3
HOW LIKELY ARE YOU TO NOD OFF OR FALL ASLEEP IN A CAR, WHILE STOPPED FOR A FEW MINUTES IN TRAFFIC: 0
HOW LIKELY ARE YOU TO NOD OFF OR FALL ASLEEP WHILE SITTING AND TALKING TO SOMEONE: 0
HOW LIKELY ARE YOU TO NOD OFF OR FALL ASLEEP WHILE SITTING QUIETLY AFTER LUNCH WITHOUT ALCOHOL: 1
ESS TOTAL SCORE: 8
HOW LIKELY ARE YOU TO NOD OFF OR FALL ASLEEP WHILE LYING DOWN TO REST IN THE AFTERNOON WHEN CIRCUMSTANCES PERMIT: 1
HOW LIKELY ARE YOU TO NOD OFF OR FALL ASLEEP WHILE SITTING INACTIVE IN A PUBLIC PLACE: 0

## 2022-03-23 NOTE — PROGRESS NOTES
C/O Pulmonary evaluation and to discuss test results    A virtual visit being done with the patient to discuss the clinical status and test results along with options, patient states that he has shortness of breath which is not more than usual, patient does not have any increasing epistaxis hemoptysis or any worsening nasal congestion, no significant sore throat or difficulty in swallowing, no fever no chills no abdominal discomfort nausea vomiting, no dizziness, no increasing abdominal symptoms of concern, no increasing leg edema, no other pertinent review of system of concern     Previous HPI: Patient is a 32-year-old male who has been referred back to the office for pulmonary evaluation by Dr. Dewane Habermann, patient was seen in the hospital last year where he was hospitalized because of ST elevation MI and patient underwent CABG, patient states that he was told to get evaluated for sleep apnea, patient continues to have increased snoring as being told by his spouse, patient also gasps for air once or twice a night, patient also has been having increased tiredness and sleepiness in the daytime, patient states that he used to attribute that to working third shift but patient states for the last 5 years time he is working dayshift and patient's clinical status has not changed, patient states that he his nose is clogged, patient also has dry mouth and soreness of the throat in the morning time, patient also has been having shortness of breath with exertion, patient's exercise tolerance has gone down, patient does not have any chest pain on exertion at this time, patient used to have reflux symptoms prior to the open heart surgery but it has improved, patient does not have any fever no chills, patient does not have any significant epistaxis or hemoptysis, patient does not have any significant altered bowel habits, patient states that he does have increased leg swelling, patient does not take any water pill, patient also states that his salty intake is high, patient does not know any recent cholesterol readings as such, patient states that since June of last year he has gained about 20 to 25 pounds, patient also continues to smoke half a pack a day, patient continues to drink alcohol on every day basis, patient also does not eat right, patient has dogs and cats as pets, patient has electric base heating without any humidification, patient when evaluated was not complaining of any significant headaches or blurring of vision or any sensation as if there is a curtain in front of the eyes, patient did not have any dizziness or diaphoresis, patient was alert and communicative, no other pertinent review of system of concern              Review of Systems same as above     Physical Exam:  There were no vitals taken for this visit.'  Constitutional:  No acute distress. HENT:  Oropharynx is clear and moist. Nothyromegaly. Eyes:  Conjunctivae are normal. Pupils equal, round, and reactive to light. No scleral icterus. Neck: . No tracheal deviation present. No obviousthyroid mass. Short large neck  Cardiovascular: Normal rate, regular rhythm, normal heart sounds. No right ventricular heave. No lower extremity edema. Pulmonary/Chest: No wheezes. No rales. Chest wall is not dull to percussion. No accessory muscle usage or stridor. Decreased breath sound density  Abdominal: Soft. Bowel sounds present. No distension or hernia. No tenderness. Obese   musculoskeletal: No cyanosis. No clubbing. No obvious joint deformity. Lymphadenopathy: No cervical or supraclavicularadenopathy. Skin: Skin is warm and dry. No rash or nodules on the exposed extremities. Psychiatric: Normal mood and affect. Behavior is normal.  No anxiety. Neurologic: Alert, awake and oriented. PERRL.   [employer]      Sleep Medicine Data:  Sitting and reading: High chance of dozing  Watching TV: Moderate chance of dozing  Sitting, inactive in a public place (e.g. a theatre or a meeting): Would never doze  As a passenger in a car for an hour without a break: Slight chance of dozing  Lying down to rest in the afternoon when circumstances permit: Slight chance of dozing  Sitting and talking to someone: Would never doze  Sitting quietly after a lunch without alcohol: Slight chance of dozing  In a car, while stopped for a few minutes in traffic: Would never doze  Total score: 8           Data:     Imaging:  I have reviewed radiology images personally. No orders to display     ECHO-Summary   Left ventricular systolic function is normal with ejection fraction   estimated at 55%. No regional wall motion abnormalities. There is mild concentric left ventricular hypertrophy. Normal left ventricular diastolic filling pressure. Mild mitral regurgitation. Mild tricuspid regurgitation. Right atrial enlargement. Systolic pulmonary artery pressure (SPAP) is normal estimated at 30 mmHg   (Right atrial pressure of 3 mmHg). Compared to exam done 6/26/2021, left ventricular systolic function has   improved. ONE XRAY VIEW OF THE CHEST       6/28/2021 2:02 pm       COMPARISON:   06/26/2021       HISTORY:   ORDERING SYSTEM PROVIDED HISTORY: Post op open heart surgery   TECHNOLOGIST PROVIDED HISTORY:   After patient is in ICU post open heart surgery   Reason for exam:->Post op open heart surgery   Reason for Exam: post op open heart surgery   Acuity: Unknown   Type of Exam: Subsequent/Follow-up       FINDINGS:   Status post median sternotomy.  Heart size stable.  Left chest tube in place. No pneumothorax.  Bibasilar hypoaeration.           Impression   Status post median sternotomy.  No pneumothorax     Patient's sleep read shows patient to have moderate sleep apnea with AHI of 21.7/h along with that patient also has some nocturnal hypoxemia with saturation dropping to as low as 78%    Assessment:    1. Obstructive  sleep apnea        2.  Shortness of breath        3. Class 1 obesity due to excess calories with serious comorbidity and body mass index (BMI) of 31.0 to 31.9 in adult        4. Tobacco use        5.  Status post four vessel coronary artery bypass                Plan:   · Patient's review of system were discussed  · Patient was told about the sleep really results which shows patient to have moderate SOLITARIO with nocturnal hypoxemia and the implications of that discussed  · Patient was told about the various options available with the most noninvasive being a CPAP machine  · Patient does not have any insurance at this time and patient states that he does not have any money to buy it outright and patient states that when he has money he will call and at that time patient was told that he can discuss with a local [employer] company to see if they have a payment plan and patient states that he will do so and call back  · Patient was told about the pathophysiology of the disease process and its modifying factors and patient was told that it is imperative for him to stop smoking otherwise he will have increasing morbidity mortality and can be respiratory cripple  · Patient was also told about the pathophysiology and sequelae of SOLITARIO  · Pretest probability of significant sleep apnea is high  · Clinically patient also has COPD along with that patient has obesity along with that patient has SOLITARIO along with that patient also continues to be a smoker and patient also drinks alcohol on a regular basis and patient had ST elevation MI with status post CABG last year and patient was told about the implications of these diagnoses in detail  · Patient's echocardiogram from last year was reviewed  · Patient's x-ray chest was reviewed  · Patient was told that he needs to have a comprehensive plan to take care of himself otherwise he will have increasing morbidity mortality  · Patient has to pay attention to his diet lifestyle modifications including smoking and alcohol cessation along with that patient has to lose weight and have a regular regimented exercise along with medications otherwise he will have issues which may be detrimental to his long-term health  · Patient also has uncontrolled hypertension on today's visit and patient states that he has taken his medications as prescribed  · Patient was told to check his blood pressure for couple of times in the next few days time and if still on the higher side and needs to discuss with PCP/cardiology about options  · Patient was told about salt and fluid restriction in the diet  · Patient needs to lose weight  · A regular regimented exercise will help  · Patient needs to have a PFT to assess for any obstructive and restrictive lung disease which is being ordered  · Patient also needs a home sleep study which is being ordered  · Smoking cessation reinforced  · As stated above patient has to have a comprehensive plan to take it himself otherwise he will have a downhill course in terms of his health  · Further management depending on patient's clinical status and patient decision about CPAP machine as and when agreeable    Louisedolly Bauman, was evaluated through a synchronous (real-time) audio-video encounter. The patient (or guardian if applicable) is aware that this is a billable service, which includes applicable co-pays. This Virtual Visit was conducted with patient's (and/or legal guardian's) consent. The visit was conducted pursuant to the emergency declaration under the [address] [place] [address], [address] [place] [address] authority and the [employer] General Act. Patient identification was verified, and a caregiver was present when appropriate. The patient was located at home in a state where the provider was licensed to provide care. Total time spent for this encounter: Not billed by time    --Noreen Garcia MD on 3/23/2022 at 1:43 PM    An electronic signature was used to authenticate this note.

## 2022-03-23 NOTE — PROGRESS NOTES
MA Communication: The following orders are received by verbal communication from Metta Dance, MD    Orders include:    Patient to call the office back.

## 2022-03-23 NOTE — PATIENT INSTRUCTIONS
Remember to bring a list of pulmonary medications and any CPAP or BiPAP machines to your next appointment with the office. Please keep all of your future appointments scheduled by [address] office. Out of respect for other patients and providers, you may be asked to reschedule your appointment if you arrive later than your scheduled appointment time. Appointments cancelled less than 24hrs in advance will be considered a no show. Patients with three missed appointments within 1 year or four missed appointments within 2 years can be dismissed from the practice. Please be aware that our physicians are required to work in the Intensive Care Unit at [facility].  Your appointment may need to be rescheduled if they are designated to work during your appointment time. You may receive a survey regarding the care you received during your visit. Your input is valuable to us. We encourage you to complete and return your survey. We hope you will choose us in the future for your healthcare needs. Pt instructed of all future appointment dates & times, including radiology, labs, procedures & referrals. If procedures were scheduled preparation instructions provided. Instructions on future appointments with [facility] were given.

## 2022-04-12 DIAGNOSIS — I10 ESSENTIAL HYPERTENSION: ICD-10-CM

## 2022-04-12 RX ORDER — LOSARTAN POTASSIUM 50 MG/1
50 TABLET ORAL DAILY
Qty: 90 TABLET | Refills: 3 | Status: SHIPPED | OUTPATIENT
Start: 2022-04-12 | End: 2022-06-29

## 2022-04-12 RX ORDER — CLOPIDOGREL BISULFATE 75 MG/1
75 TABLET ORAL DAILY
Qty: 30 TABLET | Refills: 11 | Status: SHIPPED | OUTPATIENT
Start: 2022-04-12 | End: 2022-06-28 | Stop reason: ALTCHOICE

## 2022-04-12 RX ORDER — METOPROLOL TARTRATE 50 MG/1
50 TABLET, FILM COATED ORAL 2 TIMES DAILY
Qty: 180 TABLET | Refills: 1 | Status: SHIPPED | OUTPATIENT
Start: 2022-04-12 | End: 2022-06-28 | Stop reason: SDUPTHER

## 2022-05-20 ENCOUNTER — TELEPHONE (OUTPATIENT)
Dept: PULMONOLOGY | Age: 47
End: 2022-05-20

## 2022-05-20 NOTE — TELEPHONE ENCOUNTER
Patient did not show for ov  with Dr. Sanders Smoker on 5/20/22. Reason:  na    This is patient's first no show. Patient was ano show on: na.      Patient did not reschedule.   Reschedule date:  na

## 2022-05-27 NOTE — TELEPHONE ENCOUNTER
ANEL pt.. He rescheduled his appt with Dr. Cali Preston on 8/18/22. Pt will call and reschedule his PFT.

## 2022-06-03 ENCOUNTER — TELEPHONE (OUTPATIENT)
Dept: CARDIOLOGY CLINIC | Age: 47
End: 2022-06-03

## 2022-06-03 RX ORDER — LOSARTAN POTASSIUM 100 MG/1
100 TABLET ORAL DAILY
Qty: 90 TABLET | Refills: 1 | Status: SHIPPED | OUTPATIENT
Start: 2022-06-03 | End: 2022-06-28 | Stop reason: SDUPTHER

## 2022-06-03 NOTE — TELEPHONE ENCOUNTER
Pt called in to ask if his blood pressure was within normal range. Blood pressure has been consistent at 142/97. Pt has continuedwith his medication regimen. No other symptoms with this range of blood pressure except pt stated he stays fatigued consistently. Please advise. ..

## 2022-06-27 NOTE — PROGRESS NOTES
[facility]   CARDIAC EVALUATION NOTE  [phone]      PCP:  Corrinne Plumber, APRN - CNP    Reason for Consultation/Chief Complaint: 1 year follow up for CAD    Subjective   History of Present Illness:  Leobardo Robles is a 40 y.o. patient with a history of CAD with STEMI, CABG x4, HTN and ICM who presents for hospital follow up. He was admitted to the hospital 06/26-07/02/21 with complaints of chest pain. The patient had been racing dirt bikes and began to develop chest pain with sweating. He presented to the [facility] emergency room and was noted to have inferolateral ST elevation. He was markedly hypertensive with systolic blood pressure greater than 200. He has a history of hypertension reportedly not been taking his medications. History was not obtainable from patient as he is in distress. A code STEMI alert was initiated patient was transferred to [facility]. He underwent an urgent cardiac cath on 06/26/21 which showed Successful PTA of circumflex. Successful intra-aortic balloon pump placement. Multivessel CAD/ASHD. Refer to CT surgery for consideration of CABG. His echo from 06/26/21 showed his EF was 45-50%. Mild MR. He underwent CABG x4 with DR Johana Jackson on 06/28/21. His echo from 08/20/21 showed his EF was 55%. Mild MR. Mild TR. Right atrial enlargement. OV 10/6/21he reports he feels well overall. He denies CP, SOB, dizziness or syncope. Today he reports that he is doing well overall. He denies chest pain,sob, dizziness, syncope and edema. Past Medical History:   has a past medical history of Alcohol abuse, Back pain, Class 1 obesity in adult, Coronary artery disease involving native coronary artery of native heart with unstable angina pectoris ([facility] [address] Gastroesophageal reflux disease without esophagitis, Hypertension, and Osteoarthritis. Surgical History:   has a past surgical history that includes Coronary artery bypass graft (N/A, 6/28/2021). Social History:   reports that he has been smoking cigarettes. He has been smoking about 0.50 packs per day. His smokeless tobacco use includes snuff. He reports current alcohol use of about 42.0 standard drinks of alcohol per week. He reports that he does not use drugs. Family History:  family history includes Diabetes in his maternal grandmother; Heart Disease in his maternal grandmother. Home Medications:  Were reviewed and are listed in nursing record and/or below  Prior to Admission medications    Medication Sig Start Date End Date Taking? Authorizing Provider   losartan (COZAAR) 100 MG tablet Take 1 tablet by mouth daily 6/3/22  Yes Paul Hewitt MD   clopidogrel (PLAVIX) 75 MG tablet Take 1 tablet by mouth daily 4/12/22  Yes Gaston Baca MD   metoprolol tartrate (LOPRESSOR) 50 MG tablet Take 1 tablet by mouth 2 times daily Hold for SBP <100 or HR <65 4/12/22 10/9/22 Yes Gaston Baca MD   Multiple Vitamins-Minerals (THERAPEUTIC MULTIVITAMIN-MINERALS) tablet Take 1 tablet by mouth daily 7/3/21  Yes [address] [place] [address]   losartan (COZAAR) 50 MG tablet Take 1 tablet by mouth daily Hold for SBP <100. 4/12/22 5/12/22  Gaston Baca MD   clopidogrel (PLAVIX) 75 MG tablet Take 1 tablet by mouth daily 12/7/21 1/6/22  Gaston Baca MD   atorvastatin (LIPITOR) 80 MG tablet Take 1 tablet by mouth nightly 11/28/21 3/23/22  Paul Hewitt MD   aspirin 81 MG EC tablet Take 1 tablet by mouth daily  Patient not taking: Reported on 8/10/2021 7/3/21 8/10/21  [address] [place] [address]          Allergies:  Penicillins     Review of Systems:   A 14 point review of symptoms completed. Pertinent positives identified in the HPI, all other review of symptoms negative as below.       Objective   PHYSICAL EXAM:    Vitals:    06/28/22 1518   BP: 130/86   Pulse: 76   SpO2: 95%    Weight: 206 lb (93.4 kg)         General Appearance:  Alert, cooperative, no distress, appears stated age Head:  Normocephalic, without obvious abnormality, atraumatic   Eyes:  PERRL, conjunctiva/corneas clear   Nose: Nares normal, no drainage or sinus tenderness   Throat: Lips, mucosa, and tongue normal   Neck: Supple, symmetrical, trachea midline, no adenopathy, thyroid: not enlarged, symmetric, no tenderness/mass/nodules, no carotid bruit or JVD   Lungs:   Clear to auscultation bilaterally, respirations unlabored   Chest Wall:  No deformity or tenderness   Heart:  Regular rate and rhythm, S1, S2 normal, no murmur, rub or gallop   Abdomen:   Soft, non-tender, bowel sounds active all four quadrants,  no masses, no organomegaly   Extremities: Extremities normal, atraumatic, no cyanosis or edema   Pulses: 2+ and symmetric   Skin: Skin color, texture, turgor normal, no rashes or lesions, rt leg wounds and sternal wounds healing well   Pysch: Normal mood and affect   Neurologic: Normal gross motor and sensory exam.         Labs   CBC:   Lab Results   Component Value Date    WBC 13.0 2021    RBC 3.63 2021    HGB 11.7 2021    HCT 33.4 2021    MCV 92.0 2021    RDW 12.7 2021     2021     CMP:  Lab Results   Component Value Date     2021    K 4.5 2021     2021    CO2 25 2021    BUN 23 2021    CREATININE 1.1 2021    GFRAA >60 2021    AGRATIO 1.3 2021    LABGLOM >60 2021    GLUCOSE 125 2021    PROT 6.9 2021    CALCIUM 9.1 2021    BILITOT 0.7 2021    ALKPHOS 79 2021    AST 64 2021    ALT 54 2021     PT/INR:  No results found for: PTINR  HgBA1c:No results found for: LABA1C  Lab Results   Component Value Date    TROPONINI 0.06 (H) 2021         Cardiac Data     Last EK21  SR HR 99    Echo: 21   The left ventricular systolic function is mildly reduced with an ejection   fraction of 45 -50 %.    There is hypokinesis of the inferolateral and anterolateral walls.   Mild concentric left ventricular hypertrophy. Left ventricular diastolic filling pressure is normal.   Mild mitral regurgitation. Echo: 08/20/21  Left ventricular systolic function is normal with ejection fraction   estimated at 55%. No regional wall motion abnormalities. There is mild concentric left ventricular hypertrophy. Normal left ventricular diastolic filling pressure. Mild mitral regurgitation. Mild tricuspid regurgitation. Right atrial enlargement. Systolic pulmonary artery pressure (SPAP) is normal estimated at 30 mmHg   (Right atrial pressure of 3 mmHg). Compared to exam done 6/26/2021, left ventricular systolic function has   improved. Stress Test:    Cath: 06/26/21  FINDINGS         LVGRAM     LVEDP  9   GRADIENT ACROSS AORTIC VALVE  none   LV FUNCTION EF 60%   WALL MOTION  normal   MITRAL REGURGITATION  mild            CORONARY ARTERIES     LM Long, large vessel, proximal-mid less than 10% stenosis, distal vessel has tapering with 50 to 60% stenosis. LAD  Ostial/proximal 80% stenosis. Mid-distal 60% stenosis. D1 is a small to medium size vessel with proximal-mid 75% stenosis. LCX Markedly tortuous vessel particularly at ostium and in the proximal segments, there is 50 to 60% stenosis followed by mid-distal 99% stenosis with LAILA II flow. RI  This vessel could also be described as a high OM1 with 40-50 stent proximal-mid stenosis. RCA Dominant cough tortuous, calcified, proximal 50% stenosis, mid 50% stenosis, distal 90% stenosis. PERCUTANEOUS INTERVENTION DESCRIPTION      Heparin was used for anticoagulation, patient had already been preloaded with Brilinta. Integrilin was given during the procedure and was ordered for continuous infusion as patient may ultimately require CABG. Initially a 6 Spanish mL 3.5 guiding catheter was taken and this was used to intubate the left main.   A choice floppy wire was initially taken however due to severe circumflex tortuosity, there was difficulty with wiring the circumflex lesion which was felt to be the culprit for patient's current presentation. As such a [employer] dual lumen microcatheter was taken and the choice floppy wire as well as a Medtronic cougar wire were taken through this. The choice floppy wire was advanced with the rapid exchange port and the Medtronic cougar was advanced through the proximal port. With these wires in place including with the choice wire in the high OM/ramus artery, the Medtronic cougar wire was able to be manipulated into the circumflex and across the culprit lesion. Then the choice wire was removed and using a Trapper balloon, the microcatheter was also able to be removed. With a Medtronic cougar wire in place, a 2.5 mm balloon was able to be utilized to perform angioplasty of the circumflex. With that there was improvement in flow from LAILA II to LAILA-3. There is 20% residual stenosis. Patient's EKG improved. There is no further chest pain. Residual CAD/ASHD was felt to be best treated with CABG and CT surgery consultation has been placed. Findings/plans were discussed with patient and wife, they understand the gravity situation that there is a high risk of deterioration and complications which include with extensive CAD. They understand and wish to proceed with plans as outlined. Difficulty in wiring coronary led to delay in PTA/PCI. CONCLUSIONS:      Successful PTA of circumflex  Successful intra-aortic balloon pump placement     Multivessel CAD/ASHD  Refer to CT surgery for consideration of CABG  If not felt to be a candidate for CABG, can consider high risk multivessel PCI. CAB21 with Dr Alexandru Burkett:  1.   Urgent coronary bypass grafting surgery x4 with sequential greater  saphenous vein graft to the ramus intermediate branch onto the obtuse  marginal branch of circumflex, separate single greater saphenous vein  graft to the posterior ventricular branch of the right coronary artery,  pedicled left internal artery to the LAD. 2.  Left atrial appendage obliteration with 35-mm AtriCure left atrial  clip. 3.  Cardiopulmonary bypass. 4.  Endoscopic vein harvest of right greater saphenous vein. 5.  Transesophageal echo. 6.  Epiaortic ultrasound. 7.  Doppler verification of grafts. 8.  Bilateral five-level intercostal nerve block with Exparel. 9.  Platelet gel application. 10.  Removal of preoperatively placed intraaortic balloon pump. Studies:       I have reviewed labs and imaging/xray/diagnostic testing in this note. Assessment        1. Coronary artery disease involving native coronary artery of native heart with unstable angina pectoris ([facility] [address]    2. Ischemic cardiomyopathy    3. ST elevation myocardial infarction (STEMI), unspecified artery ([facility] [address]    4. Essential hypertension             Plan   1. STOP taking Plavix-therapy completed  2. Continue taking all other medications the same for chronic conditions above. Refills warranted  3. No cardiac testing warranted  4. Lipids, LFT TSH  5. Follow up 1 year            Thank you for allowing us to participate in the care of C/ Sinan Peterson 82. Please call me with any questions [phone]. Ad Garza MD, [facility]   Interventional Cardiologist  [facility]  [phone] [address] [place] [address]  [phone] [address] [place] [address]  6/28/2022 3:52 PM    I will address the patient's cardiac risk factors and adjusted pharmacologic treatment as needed. In addition, I have reinforced the need for patient directed risk factor modification. Tobacco use was discussed with the patient and educated on the negative effects and was asked not to use. All questions and concerns were addressed to the patient/family. Alternatives to my treatment were discussed.      I, Dr Ad Garza, personally performed the services described in this documentation, as scribed by the above signed scribe in my presence. It is both accurate and complete to my knowledge. I agree with the details independently gathered by the clinical support staff and the scribed note accurately describes my personal service to the patient.

## 2022-06-28 ENCOUNTER — OFFICE VISIT (OUTPATIENT)
Dept: CARDIOLOGY CLINIC | Age: 47
End: 2022-06-28

## 2022-06-28 VITALS
HEIGHT: 69 IN | DIASTOLIC BLOOD PRESSURE: 86 MMHG | WEIGHT: 206 LBS | BODY MASS INDEX: 30.51 KG/M2 | SYSTOLIC BLOOD PRESSURE: 130 MMHG | OXYGEN SATURATION: 95 % | HEART RATE: 76 BPM

## 2022-06-28 DIAGNOSIS — I21.3 ST ELEVATION MYOCARDIAL INFARCTION (STEMI), UNSPECIFIED ARTERY (HCC): ICD-10-CM

## 2022-06-28 DIAGNOSIS — I25.5 ISCHEMIC CARDIOMYOPATHY: ICD-10-CM

## 2022-06-28 DIAGNOSIS — Z79.899 MEDICATION MANAGEMENT: ICD-10-CM

## 2022-06-28 DIAGNOSIS — I10 ESSENTIAL HYPERTENSION: ICD-10-CM

## 2022-06-28 DIAGNOSIS — I25.110 CORONARY ARTERY DISEASE INVOLVING NATIVE CORONARY ARTERY OF NATIVE HEART WITH UNSTABLE ANGINA PECTORIS (HCC): Primary | ICD-10-CM

## 2022-06-28 PROCEDURE — 99214 OFFICE O/P EST MOD 30 MIN: CPT | Performed by: INTERNAL MEDICINE

## 2022-06-28 PROCEDURE — 93000 ELECTROCARDIOGRAM COMPLETE: CPT | Performed by: INTERNAL MEDICINE

## 2022-06-28 NOTE — PATIENT INSTRUCTIONS
Plan   1. STOP taking Plavix-therapy completed  2. Continue taking all other medications the same. Refills warranted  3. No cardiac testing warranted  4. Lipids, LFT TSH  5.  Follow up 1 year

## 2022-06-29 RX ORDER — LOSARTAN POTASSIUM 100 MG/1
100 TABLET ORAL DAILY
Qty: 90 TABLET | Refills: 3 | Status: SHIPPED | OUTPATIENT
Start: 2022-06-29 | End: 2022-07-27 | Stop reason: SDUPTHER

## 2022-06-29 RX ORDER — ASPIRIN 81 MG/1
81 TABLET ORAL DAILY
Qty: 30 TABLET | Refills: 3 | Status: SHIPPED | OUTPATIENT
Start: 2022-06-29 | End: 2022-10-27

## 2022-06-29 RX ORDER — ATORVASTATIN CALCIUM 80 MG/1
80 TABLET, FILM COATED ORAL NIGHTLY
Qty: 90 TABLET | Refills: 3 | Status: SHIPPED | OUTPATIENT
Start: 2022-06-29 | End: 2022-07-27 | Stop reason: SDUPTHER

## 2022-06-29 RX ORDER — METOPROLOL TARTRATE 50 MG/1
50 TABLET, FILM COATED ORAL 2 TIMES DAILY
Qty: 180 TABLET | Refills: 3 | Status: SHIPPED | OUTPATIENT
Start: 2022-06-29 | End: 2022-07-27 | Stop reason: SDUPTHER

## 2022-07-26 DIAGNOSIS — I25.110 CORONARY ARTERY DISEASE INVOLVING NATIVE CORONARY ARTERY OF NATIVE HEART WITH UNSTABLE ANGINA PECTORIS (HCC): ICD-10-CM

## 2022-07-26 DIAGNOSIS — I10 ESSENTIAL HYPERTENSION: ICD-10-CM

## 2022-07-26 NOTE — TELEPHONE ENCOUNTER
Medication Refill    Medication needing refilled:losartan (COZAAR) 100 MG tablet       Dosage of the medication: 100 mg     How are you taking this medication (QD, BID, TID, QID, PRN): : Take 1 tablet by mouth daily     30 or 90 day supply called in: 90     When will you run out of your medication: out now    Which Pharmacy are we sending the medication to?:  [facility]  [id number] [facility] [phone] [facility] [phone]   [address] [address] [place] [address]   Phone:  [phone]  Fax:  [phone]      Medication Refill    Medication needing refilled:metoprolol tartrate (LOPRESSOR) 50 MG tablet     Dosage of the medication: 50 mg     How are you taking this medication (QD, BID, TID, QID, PRN): Take 1 tablet by mouth 2 times daily Hold for SBP <100 or HR <65    30 or 90 day supply called in: 180     When will you run out of your medication: out now     Which Pharmacy are we sending the medication to?:  [facility]  [id number] [facility] [phone] [facility] [phone]   [address] [address] [place] [address]   Phone:  [phone]  Fax:  [phone]      Medication Refill    Medication needing refilled:atorvastatin (LIPITOR) 80 MG tablet     Dosage of the medication: 80 mg     How are you taking this medication (QD, BID, TID, QID, PRN): Take 1 tablet by mouth nightly    30 or 90 day supply called in: 90     When will you run out of your medication: out now     Which Pharmacy are we sending the medication to?:    [facility] [address] [id number] - [address] [place], [place] [address] [phone]   [address] [address] [place] [address] [address]   Phone:  [phone]  Fax:  [phone]

## 2022-07-27 RX ORDER — METOPROLOL TARTRATE 50 MG/1
50 TABLET, FILM COATED ORAL 2 TIMES DAILY
Qty: 180 TABLET | Refills: 3 | Status: SHIPPED | OUTPATIENT
Start: 2022-07-27 | End: 2022-08-30 | Stop reason: SDUPTHER

## 2022-07-27 RX ORDER — ATORVASTATIN CALCIUM 80 MG/1
80 TABLET, FILM COATED ORAL NIGHTLY
Qty: 90 TABLET | Refills: 3 | Status: SHIPPED | OUTPATIENT
Start: 2022-07-27 | End: 2022-08-30 | Stop reason: SDUPTHER

## 2022-07-27 RX ORDER — LOSARTAN POTASSIUM 100 MG/1
100 TABLET ORAL DAILY
Qty: 90 TABLET | Refills: 3 | Status: SHIPPED | OUTPATIENT
Start: 2022-07-27 | End: 2022-08-30 | Stop reason: SDUPTHER

## 2022-08-29 DIAGNOSIS — I10 ESSENTIAL HYPERTENSION: ICD-10-CM

## 2022-08-29 DIAGNOSIS — I25.110 CORONARY ARTERY DISEASE INVOLVING NATIVE CORONARY ARTERY OF NATIVE HEART WITH UNSTABLE ANGINA PECTORIS (HCC): ICD-10-CM

## 2022-08-29 NOTE — TELEPHONE ENCOUNTER
Medication Refill    Medication needing refilled:atorvastatin (LIPITOR) 80 MG tablet       Dosage of the medication: 80 mg    How are you taking this medication (QD, BID, TID, QID, PRN): Take 1 tablet by mouth nightly    30 or 90 day supply called in: 90     When will you run out of your medication: OUT NOW     Which Pharmacy are we sending the medication to?:  [facility], [facility] [address]   [phone]      Medication Refill    Medication needing refilled:losartan (COZAAR) 100 MG tablet       Dosage of the medication: 100 mg     How are you taking this medication (QD, BID, TID, QID, PRN): Take 1 tablet by mouth in the morning    30 or 90 day supply called in: 90     When will you run out of your medication: [facility] are we sending the medication to?:  [address] [facility], [facility]  [phone]      Medication Refill    Medication needing refilled:metoprolol tartrate (LOPRESSOR) 50 MG tablet       Dosage of the medication: 50 mg     How are you taking this medication (QD, BID, TID, QID, PRN): Take 1 tablet by mouth in the morning and 1 tablet before bedtime.  Hold for SBP <100 or HR <65    30 or 90 day supply called in: 180     When will you run out of your medication: OUT NOW     Which Pharmacy are we sending the medication to?:    [facility] [facility], [facility]   [phone]

## 2022-08-30 RX ORDER — METOPROLOL TARTRATE 50 MG/1
50 TABLET, FILM COATED ORAL 2 TIMES DAILY
Qty: 180 TABLET | Refills: 3 | Status: SHIPPED | OUTPATIENT
Start: 2022-08-30 | End: 2022-09-09 | Stop reason: SDUPTHER

## 2022-08-30 RX ORDER — LOSARTAN POTASSIUM 100 MG/1
100 TABLET ORAL DAILY
Qty: 90 TABLET | Refills: 3 | Status: SHIPPED | OUTPATIENT
Start: 2022-08-30 | End: 2022-09-09 | Stop reason: SDUPTHER

## 2022-08-30 RX ORDER — ATORVASTATIN CALCIUM 80 MG/1
80 TABLET, FILM COATED ORAL NIGHTLY
Qty: 90 TABLET | Refills: 3 | Status: SHIPPED | OUTPATIENT
Start: 2022-08-30 | End: 2022-09-09 | Stop reason: SDUPTHER

## 2022-09-07 NOTE — TELEPHONE ENCOUNTER
Pts pharmacy stated that we will need to resend the prescriptions for Losartan 100mg, Atorvastatin 80mg, and Metoprolol Tartrate 50mg. Pts pharmacy names switched from [facility] to [facility] [phone].

## 2022-09-08 NOTE — TELEPHONE ENCOUNTER
Attempted to reach patient. Need to know if pharmacy itself was changed or he now goes to a different pharmacy called [facility]. If it is a different pharmacy would need to contact [facility] to have them call [facility] to transfer scripts.

## 2022-09-09 RX ORDER — METOPROLOL TARTRATE 50 MG/1
50 TABLET, FILM COATED ORAL 2 TIMES DAILY
Qty: 180 TABLET | Refills: 3 | Status: SHIPPED | OUTPATIENT
Start: 2022-09-09 | End: 2023-09-04

## 2022-09-09 RX ORDER — ATORVASTATIN CALCIUM 80 MG/1
80 TABLET, FILM COATED ORAL NIGHTLY
Qty: 90 TABLET | Refills: 3 | Status: SHIPPED | OUTPATIENT
Start: 2022-09-09 | End: 2023-09-04

## 2022-09-09 RX ORDER — LOSARTAN POTASSIUM 100 MG/1
100 TABLET ORAL DAILY
Qty: 90 TABLET | Refills: 3 | Status: SHIPPED | OUTPATIENT
Start: 2022-09-09

## 2022-09-09 NOTE — TELEPHONE ENCOUNTER
Spoke with Jill at [facility] [address], she says they can not contact [facility] due to it closing. Will resend RX electronically.

## 2022-09-09 NOTE — TELEPHONE ENCOUNTER
Pt returned [facility] phone call and stated he has for sure changed pharmacies. [facility]   Phone: [phone]  Fax: [phone]  Hours:   Friday 9AM-6PM  Saturday 9AM-1PM  Sunday Closed  Monday 9AM-6PM  Tuesday 9AM-6PM  Wednesday 9AM-6PM  Thursday 9AM-6PM    Plese send refills to new pharmacy.

## 2023-10-18 DIAGNOSIS — I10 ESSENTIAL HYPERTENSION: ICD-10-CM

## 2023-10-18 DIAGNOSIS — I25.110 CORONARY ARTERY DISEASE INVOLVING NATIVE CORONARY ARTERY OF NATIVE HEART WITH UNSTABLE ANGINA PECTORIS (HCC): ICD-10-CM

## 2023-10-18 RX ORDER — METOPROLOL TARTRATE 50 MG/1
TABLET, FILM COATED ORAL
Qty: 180 TABLET | Refills: 3 | Status: SHIPPED | OUTPATIENT
Start: 2023-10-18

## 2023-10-18 RX ORDER — LOSARTAN POTASSIUM 100 MG/1
100 TABLET ORAL DAILY
Qty: 90 TABLET | Refills: 3 | Status: SHIPPED | OUTPATIENT
Start: 2023-10-18

## 2023-10-18 RX ORDER — ATORVASTATIN CALCIUM 80 MG/1
80 TABLET, FILM COATED ORAL
Qty: 90 TABLET | Refills: 3 | Status: SHIPPED | OUTPATIENT
Start: 2023-10-18

## 2024-06-05 ENCOUNTER — TELEPHONE (OUTPATIENT)
Dept: CARDIOLOGY CLINIC | Age: 49
End: 2024-06-05

## 2024-06-05 NOTE — TELEPHONE ENCOUNTER
Called and spoke with pt, pt states he is going to go to ER. Pt ended phone call. Unable to get pt scheduled for ov with ELIZA.     ELIZA NASSAR  Pt not seen since 6/28/22 no upcoming at this time.

## 2024-06-05 NOTE — TELEPHONE ENCOUNTER
Pt called in to make appt with [facility]. Gave next available dates and pt states that is too far out. Pt states his blood pressure has been high and gets sob when walking. Pt states he will go to ED.

## (undated) DEVICE — SS SUTURE, 3 PER SLEEVE: Brand: MYO/WIRE II

## (undated) DEVICE — APPLIER CLP L9.375IN APER 2.1MM CLS L3.8MM 20 SM TI CLP

## (undated) DEVICE — PERFUSION PACK O2

## (undated) DEVICE — NEEDLE SPNL 20GA LNG YEL HUB DISP

## (undated) DEVICE — SUTURE VCRL SZ 0 L27IN ABSRB UD L36MM CT-1 1/2 CIR J260H

## (undated) DEVICE — SUTURE PROL SZ 4-0 L36IN NONABSORBABLE BLU BB L17MM 3/8 CIR 8581H

## (undated) DEVICE — BLADE STRNM SAW STR 10/BX

## (undated) DEVICE — SUTURE VCRL SZ 4-0 L18IN ABSRB UD L19MM PS-2 3/8 CIR PRIM J496H

## (undated) DEVICE — RESERVOIR,SUCTION,100CC,SILICONE: Brand: MEDLINE

## (undated) DEVICE — ADHESIVE SKIN CLSR 0.7ML TOP DERMBND ADV

## (undated) DEVICE — SUTURE PROL SZ 6-0 L24IN NONABSORBABLE BLU L13MM C-1 3/8 8726H

## (undated) DEVICE — Device

## (undated) DEVICE — SUTURE ETHBND EXCEL SZ 2-0 L36IN NONABSORBABLE GRN L26MM SH X523H

## (undated) DEVICE — STERILE POLYISOPRENE POWDER-FREE SURGICAL GLOVES: Brand: PROTEXIS

## (undated) DEVICE — BLADE OPHTH D5MM 15DEG GRN W/ RND KNURLED HNDL MICRO-SHARP

## (undated) DEVICE — Z DUP USE 2537558 SYSTEM ENDOSCP VES HARV VASO VW HEMOPRO

## (undated) DEVICE — CANNULA PERF 15FR L12.5IN RG STPCOCK WIREWOUND BODY

## (undated) DEVICE — GOWN SIRUS NONREIN XL W/TWL: Brand: MEDLINE INDUSTRIES, INC.

## (undated) DEVICE — SUTURE VCRL SZ 3-0 L27IN ABSRB UD L26MM SH 1/2 CIR J416H

## (undated) DEVICE — LEAD PACE 2.6FR L50CM UPLR TEMP ATR NONSTEROID ELUT PIN

## (undated) DEVICE — FOGARTY - HYDRAGRIP SURGICAL - CLAMP INSERTS: Brand: FOGARTY SOFTJAW

## (undated) DEVICE — PACK PROCEDURE SURG OPN HRT A BASIC

## (undated) DEVICE — SOLUTION IV IRRIG POUR BRL 0.9% SODIUM CHL 2F7124

## (undated) DEVICE — SYSTEM BLD DEL

## (undated) DEVICE — KIT,ANTI FOG,W/SPONGE & FLUID,SOFT PACK: Brand: MEDLINE

## (undated) DEVICE — SUTURE NONABSORBABLE MONOFILAMENT 5-0 C-1 1X24 IN PROLENE 8725H

## (undated) DEVICE — CANNULA PERF 7FR L5.5IN AORT ROOT RADPQ STD TIP W/ VENT LN

## (undated) DEVICE — SUTURE VCRL SZ 3-0 L27IN ABSRB UD L36MM CT-1 1/2 CIR J258H

## (undated) DEVICE — KIT APPL 11:1 PROC W/ FIBRIJET MED CUP APPL TIP TY

## (undated) DEVICE — TIP APPL 20 GAX5 CM 2 CANN MALL

## (undated) DEVICE — PUNCH AORT BLDE DIA4.4MM LNG HNDL SHRP 2 CUT EDGE FOR COR

## (undated) DEVICE — BLADE SURG MIC STR DBL BVL SHRP ALL ARND SHRPTOME

## (undated) DEVICE — SUTURE NONABSORBABLE MONOFILAMENT 7-0 BV-1 1X24 IN PROLENE 8702H

## (undated) DEVICE — CLIP SM RED INTERN HMOCLP TITAN LIGATING

## (undated) DEVICE — SUTURE SZ 7 L18IN NONABSORBABLE SIL CCS L48MM 1/2 CIR STRNM M655G

## (undated) DEVICE — TIP APPL TOP 2 SPRY

## (undated) DEVICE — WAX SURG 2.5GM HEMSTAT BNE BEESWAX PARAFFIN ISO PALMITATE

## (undated) DEVICE — SYSTEM VES HARV ENDOSCP VASOVIEW HEMOPRO

## (undated) DEVICE — DRAIN SURG FLAT W7MMXL20CM FULL PERF

## (undated) DEVICE — LEAD PACEMKR MYOCARDIAL UNIPOLAR TEMP

## (undated) DEVICE — APPLICATOR LNG TP 12.5IN

## (undated) DEVICE — SUTURE ETHBND EXCEL SZ 0 L18IN NONABSORBABLE GRN L36MM CT-1 CX21D

## (undated) DEVICE — [HIGH FLOW INSUFFLATOR,  DO NOT USE IF PACKAGE IS DAMAGED,  KEEP DRY,  KEEP AWAY FROM SUNLIGHT,  PROTECT FROM HEAT AND RADIOACTIVE SOURCES.]: Brand: PNEUMOSURE

## (undated) DEVICE — THORACIC CATHETER, RIGHT ANGLE, SILICONE, WITH CLOTSTOP®: Brand: AXIOM® ATRAUM™ WITH CLOTSTOP®

## (undated) DEVICE — LIGHT HANDLE: Brand: DEVON

## (undated) DEVICE — SUTURE ABSORBABLE BRAIDED 2-0 CT-1 27 IN UD VICRYL J259H

## (undated) DEVICE — BLOOD TRANSFUSION FILTER: Brand: HAEMONETICS

## (undated) DEVICE — JACKSON-PRATT 100CC BULB RESERVOIR: Brand: CARDINAL HEALTH

## (undated) DEVICE — BLADE RETRCT 3 SH FNGR ASST

## (undated) DEVICE — THORACIC CATHETER, STRAIGHT, SILICONE, WITH CLOTSTOP®: Brand: AXIOM® ATRAUM™ WITH CLOTSTOP®

## (undated) DEVICE — SUTURE PROL SZ 3-0 L36IN NONABSORBABLE BLU L26MM SH 1/2 CIR 8522H

## (undated) DEVICE — COVER TRNSDUC W6XL96IN POLY TELESCOPICALLY FLD W/ ATTCH FRM